# Patient Record
Sex: MALE | Race: WHITE | NOT HISPANIC OR LATINO | Employment: PART TIME | URBAN - METROPOLITAN AREA
[De-identification: names, ages, dates, MRNs, and addresses within clinical notes are randomized per-mention and may not be internally consistent; named-entity substitution may affect disease eponyms.]

---

## 2021-06-14 ENCOUNTER — PREP FOR SURGERY (OUTPATIENT)
Dept: OTHER | Facility: HOSPITAL | Age: 41
End: 2021-06-14

## 2021-06-14 ENCOUNTER — OFFICE VISIT (OUTPATIENT)
Dept: SURGERY | Facility: CLINIC | Age: 41
End: 2021-06-14

## 2021-06-14 VITALS — HEIGHT: 75 IN | BODY MASS INDEX: 25.66 KG/M2 | WEIGHT: 206.4 LBS

## 2021-06-14 DIAGNOSIS — K21.9 GASTROESOPHAGEAL REFLUX DISEASE WITHOUT ESOPHAGITIS: ICD-10-CM

## 2021-06-14 DIAGNOSIS — R11.14 BILIOUS VOMITING WITH NAUSEA: Primary | ICD-10-CM

## 2021-06-14 DIAGNOSIS — K21.9 GERD WITHOUT ESOPHAGITIS: ICD-10-CM

## 2021-06-14 DIAGNOSIS — R11.2 NAUSEA WITH VOMITING: Primary | ICD-10-CM

## 2021-06-14 PROCEDURE — 99203 OFFICE O/P NEW LOW 30 MIN: CPT | Performed by: NURSE PRACTITIONER

## 2021-06-14 RX ORDER — SUCRALFATE 1 G/1
1 TABLET ORAL 4 TIMES DAILY
Status: ON HOLD | COMMUNITY
End: 2021-07-07

## 2021-06-14 RX ORDER — LEVOMILNACIPRAN HYDROCHLORIDE 120 MG/1
120 CAPSULE, EXTENDED RELEASE ORAL DAILY
COMMUNITY

## 2021-06-14 RX ORDER — ATORVASTATIN CALCIUM 20 MG/1
20 TABLET, FILM COATED ORAL DAILY
COMMUNITY

## 2021-06-14 RX ORDER — SODIUM CHLORIDE 0.9 % (FLUSH) 0.9 %
3 SYRINGE (ML) INJECTION EVERY 12 HOURS SCHEDULED
Status: CANCELLED | OUTPATIENT
Start: 2021-06-14

## 2021-06-14 RX ORDER — ARIPIPRAZOLE 2 MG/1
2 TABLET ORAL DAILY
COMMUNITY

## 2021-06-14 RX ORDER — TRAZODONE HYDROCHLORIDE 100 MG/1
100 TABLET ORAL NIGHTLY
COMMUNITY

## 2021-06-14 RX ORDER — SODIUM CHLORIDE 0.9 % (FLUSH) 0.9 %
10 SYRINGE (ML) INJECTION AS NEEDED
Status: CANCELLED | OUTPATIENT
Start: 2021-06-14

## 2021-06-14 RX ORDER — LISINOPRIL 20 MG/1
20 TABLET ORAL DAILY
COMMUNITY
End: 2021-06-16

## 2021-06-14 RX ORDER — ONDANSETRON 4 MG/1
4 TABLET, FILM COATED ORAL EVERY 6 HOURS PRN
Qty: 15 TABLET | Refills: 0 | Status: ON HOLD | OUTPATIENT
Start: 2021-06-14 | End: 2021-07-08 | Stop reason: SDUPTHER

## 2021-06-14 RX ORDER — LISINOPRIL 30 MG/1
30 TABLET ORAL DAILY
COMMUNITY

## 2021-06-14 NOTE — PROGRESS NOTES
"Chief Complaint  EGD (Vomitting daily x6 months)    Subjective          Vasiliy Van presents to [facility] GENERAL SURGERY  Patient presents today on referral from Yajaira Conklin for nausea and vomiting x6 months.  Patient reports that he is vomiting mostly in the morning, reports that yellow in color.  Reports when he lies flat in the bed at night he has coughing spells.  Admits to GERD symptoms.  Patient was started on Carafate 4X/day, reports that it has improved his symptoms some.  Denies any abdominal pain or dysphagia.  Patient does report report feeling full before finishing meals.  Denies any family history of esophageal or stomach cancer.  No previous EGD.      Objective   Vital Signs:   Ht 190.5 cm (75\")   Wt 93.6 kg (206 lb 6.4 oz)   BMI 25.80 kg/m²     Physical Exam  Constitutional:       Appearance: Normal appearance.   Pulmonary:      Effort: Pulmonary effort is normal.   Abdominal:      General: Abdomen is flat.      Palpations: Abdomen is soft.   Neurological:      Mental Status: He is alert.        Result Review :                 Assessment and Plan    Diagnoses and all orders for this visit:    1. Bilious vomiting with nausea (Primary)    2. Gastroesophageal reflux disease without esophagitis        Follow Up   Return for EGD with Dr. Mejia on 6/22/21.  Patient was given instructions and counseling regarding his condition or for health maintenance advice. Please see specific information pulled into the AVS if appropriate.       "

## 2021-06-16 NOTE — PRE-PROCEDURE INSTRUCTIONS
ALSO INSTRUCTED PT ON MEDS TO TAKE MORNING OF THE PROCEDURE...ONLY TAKE ZOFRAN,ABILIFY, AND CARAFATE SINCE PROCEDURE IS NOT UNTIL 1:30 PM   Finasteride Counseling:  I discussed with the patient the risks of use of finasteride including but not limited to decreased libido, decreased ejaculate volume, gynecomastia, and depression. Women should not handle medication.  All of the patient's questions and concerns were addressed. Finasteride Male Counseling: Finasteride Counseling:  I discussed with the patient the risks of use of finasteride including but not limited to decreased libido, decreased ejaculate volume, gynecomastia, and depression. Women should not handle medication.  All of the patient's questions and concerns were addressed.

## 2021-06-17 ENCOUNTER — LAB (OUTPATIENT)
Dept: LAB | Facility: HOSPITAL | Age: 41
End: 2021-06-17

## 2021-06-17 DIAGNOSIS — K21.9 GERD WITHOUT ESOPHAGITIS: ICD-10-CM

## 2021-06-17 DIAGNOSIS — R11.2 NAUSEA WITH VOMITING: ICD-10-CM

## 2021-06-17 PROCEDURE — C9803 HOPD COVID-19 SPEC COLLECT: HCPCS

## 2021-06-17 PROCEDURE — U0005 INFEC AGEN DETEC AMPLI PROBE: HCPCS

## 2021-06-17 PROCEDURE — U0003 INFECTIOUS AGENT DETECTION BY NUCLEIC ACID (DNA OR RNA); SEVERE ACUTE RESPIRATORY SYNDROME CORONAVIRUS 2 (SARS-COV-2) (CORONAVIRUS DISEASE [COVID-19]), AMPLIFIED PROBE TECHNIQUE, MAKING USE OF HIGH THROUGHPUT TECHNOLOGIES AS DESCRIBED BY CMS-2020-01-R: HCPCS

## 2021-06-18 LAB — SARS-COV-2 RNA RESP QL NAA+PROBE: NOT DETECTED

## 2021-06-22 ENCOUNTER — ANESTHESIA EVENT (OUTPATIENT)
Dept: GASTROENTEROLOGY | Facility: HOSPITAL | Age: 41
End: 2021-06-22

## 2021-06-22 ENCOUNTER — HOSPITAL ENCOUNTER (OUTPATIENT)
Facility: HOSPITAL | Age: 41
Setting detail: HOSPITAL OUTPATIENT SURGERY
Discharge: HOME | End: 2021-06-22
Attending: SURGERY | Admitting: SURGERY

## 2021-06-22 ENCOUNTER — ANESTHESIA (OUTPATIENT)
Dept: GASTROENTEROLOGY | Facility: HOSPITAL | Age: 41
End: 2021-06-22

## 2021-06-22 VITALS
SYSTOLIC BLOOD PRESSURE: 124 MMHG | DIASTOLIC BLOOD PRESSURE: 87 MMHG | HEART RATE: 98 BPM | WEIGHT: 202.82 LBS | OXYGEN SATURATION: 97 % | TEMPERATURE: 97.8 F | RESPIRATION RATE: 15 BRPM | HEIGHT: 74 IN | BODY MASS INDEX: 26.03 KG/M2

## 2021-06-22 DIAGNOSIS — R11.2 NAUSEA WITH VOMITING: ICD-10-CM

## 2021-06-22 DIAGNOSIS — K21.9 GERD WITHOUT ESOPHAGITIS: ICD-10-CM

## 2021-06-22 PROCEDURE — 88305 TISSUE EXAM BY PATHOLOGIST: CPT | Performed by: SURGERY

## 2021-06-22 PROCEDURE — 25010000002 PROPOFOL 10 MG/ML EMULSION: Performed by: NURSE ANESTHETIST, CERTIFIED REGISTERED

## 2021-06-22 RX ORDER — SODIUM CHLORIDE, SODIUM LACTATE, POTASSIUM CHLORIDE, CALCIUM CHLORIDE 600; 310; 30; 20 MG/100ML; MG/100ML; MG/100ML; MG/100ML
9 INJECTION, SOLUTION INTRAVENOUS CONTINUOUS
Status: DISCONTINUED | OUTPATIENT
Start: 2021-06-22 | End: 2021-06-22 | Stop reason: HOSPADM

## 2021-06-22 RX ORDER — SODIUM CHLORIDE 0.9 % (FLUSH) 0.9 %
10 SYRINGE (ML) INJECTION AS NEEDED
Status: DISCONTINUED | OUTPATIENT
Start: 2021-06-22 | End: 2021-06-22 | Stop reason: HOSPADM

## 2021-06-22 RX ORDER — LIDOCAINE HYDROCHLORIDE 20 MG/ML
INJECTION, SOLUTION INFILTRATION; PERINEURAL AS NEEDED
Status: DISCONTINUED | OUTPATIENT
Start: 2021-06-22 | End: 2021-06-22 | Stop reason: SURG

## 2021-06-22 RX ORDER — SODIUM CHLORIDE 0.9 % (FLUSH) 0.9 %
3 SYRINGE (ML) INJECTION EVERY 12 HOURS SCHEDULED
Status: DISCONTINUED | OUTPATIENT
Start: 2021-06-22 | End: 2021-06-22 | Stop reason: HOSPADM

## 2021-06-22 RX ORDER — PROPOFOL 10 MG/ML
VIAL (ML) INTRAVENOUS AS NEEDED
Status: DISCONTINUED | OUTPATIENT
Start: 2021-06-22 | End: 2021-06-22 | Stop reason: SURG

## 2021-06-22 RX ADMIN — PROPOFOL 100 MG: 10 INJECTION, EMULSION INTRAVENOUS at 14:25

## 2021-06-22 RX ADMIN — SODIUM CHLORIDE, POTASSIUM CHLORIDE, SODIUM LACTATE AND CALCIUM CHLORIDE: 600; 310; 30; 20 INJECTION, SOLUTION INTRAVENOUS at 14:21

## 2021-06-22 RX ADMIN — LIDOCAINE HYDROCHLORIDE 100 MG: 20 INJECTION, SOLUTION INFILTRATION; PERINEURAL at 14:25

## 2021-06-22 RX ADMIN — PROPOFOL 175 MCG/KG/MIN: 10 INJECTION, EMULSION INTRAVENOUS at 14:25

## 2021-06-22 NOTE — ANESTHESIA PREPROCEDURE EVALUATION
Anesthesia Evaluation     Patient summary reviewed and Nursing notes reviewed   no history of anesthetic complications:  NPO Solid Status: > 8 hours  NPO Liquid Status: > 2 hours           Airway   Mallampati: II  TM distance: >3 FB  Neck ROM: full  No difficulty expected  Dental      Pulmonary - normal exam    breath sounds clear to auscultation  (+) sleep apnea,   Cardiovascular - normal exam  Exercise tolerance: good (4-7 METS)    Rhythm: regular    (+) hypertension, hyperlipidemia,       Neuro/Psych- negative ROS  GI/Hepatic/Renal/Endo    (+)  GERD,      Musculoskeletal (-) negative ROS    Abdominal    Substance History - negative use     OB/GYN negative ob/gyn ROS         Other - negative ROS                     Anesthesia Plan    ASA 3     general   total IV anesthesia  intravenous induction     Anesthetic plan, all risks, benefits, and alternatives have been provided, discussed and informed consent has been obtained with: patient.

## 2021-06-22 NOTE — DISCHARGE INSTRUCTIONS
Hiatal Hernia    A hiatal hernia occurs when part of the stomach slides above the muscle that separates the abdomen from the chest (diaphragm). A person can be born with a hiatal hernia (congenital), or it may develop over time. In almost all cases of hiatal hernia, only the top part of the stomach pushes through the diaphragm.  Many people have a hiatal hernia with no symptoms. The larger the hernia, the more likely it is that you will have symptoms. In some cases, a hiatal hernia allows stomach acid to flow back into the tube that carries food from your mouth to your stomach (esophagus). This may cause heartburn symptoms. Severe heartburn symptoms may mean that you have developed a condition called gastroesophageal reflux disease (GERD).  What are the causes?  This condition is caused by a weakness in the opening (hiatus) where the esophagus passes through the diaphragm to attach to the upper part of the stomach. A person may be born with a weakness in the hiatus, or a weakness can develop over time.  What increases the risk?  This condition is more likely to develop in:  · Older people. Age is a major risk factor for a hiatal hernia, especially if you are over the age of 50.  · Pregnant women.  · People who are overweight.  · People who have frequent constipation.  What are the signs or symptoms?  Symptoms of this condition usually develop in the form of GERD symptoms. Symptoms include:  · Heartburn.  · Belching.  · Indigestion.  · Trouble swallowing.  · Coughing or wheezing.  · Sore throat.  · Hoarseness.  · Chest pain.  · Nausea and vomiting.  How is this diagnosed?  This condition may be diagnosed during testing for GERD. Tests that may be done include:  · X-rays of your stomach or chest.  · An upper gastrointestinal (GI) series. This is an X-ray exam of your GI tract that is taken after you swallow a chalky liquid that shows up clearly on the X-ray.  · Endoscopy. This is a procedure to look into your stomach  using a thin, flexible tube that has a tiny camera and light on the end of it.  How is this treated?  This condition may be treated by:  · Dietary and lifestyle changes to help reduce GERD symptoms.  · Medicines. These may include:  ? Over-the-counter antacids.  ? Medicines that make your stomach empty more quickly.  ? Medicines that block the production of stomach acid (H2 blockers).  ? Stronger medicines to reduce stomach acid (proton pump inhibitors).  · Surgery to repair the hernia, if other treatments are not helping.  If you have no symptoms, you may not need treatment.  Follow these instructions at home:  Lifestyle and activity  · Do not use any products that contain nicotine or tobacco, such as cigarettes and e-cigarettes. If you need help quitting, ask your health care provider.  · Try to achieve and maintain a healthy body weight.  · Avoid putting pressure on your abdomen. Anything that puts pressure on your abdomen increases the amount of acid that may be pushed up into your esophagus.  ? Avoid bending over, especially after eating.  ? Raise the head of your bed by putting blocks under the legs. This keeps your head and esophagus higher than your stomach.  ? Do not wear tight clothing around your chest or stomach.  ? Try not to strain when having a bowel movement, when urinating, or when lifting heavy objects.  Eating and drinking  · Avoid foods that can worsen GERD symptoms. These may include:  ? Fatty foods, like fried foods.  ? Citrus fruits, like oranges or lemon.  ? Other foods and drinks that contain acid, like orange juice or tomatoes.  ? Spicy food.  ? Chocolate.  · Eat frequent small meals instead of three large meals a day. This helps prevent your stomach from getting too full.  ? Eat slowly.  ? Do not lie down right after eating.  ? Do not eat 1-2 hours before bed.  · Do not drink beverages with caffeine. These include cola, coffee, cocoa, and tea.  · Do not drink alcohol.  General  instructions  · Take over-the-counter and prescription medicines only as told by your health care provider.  · Keep all follow-up visits as told by your health care provider. This is important.  Contact a health care provider if:  · Your symptoms are not controlled with medicines or lifestyle changes.  · You are having trouble swallowing.  · You have coughing or wheezing that will not go away.  Get help right away if:  · Your pain is getting worse.  · Your pain spreads to your arms, neck, jaw, teeth, or back.  · You have shortness of breath.  · You sweat for no reason.  · You feel sick to your stomach (nauseous) or you vomit.  · You vomit blood.  · You have bright red blood in your stools.  · You have black, tarry stools.  This information is not intended to replace advice given to you by your health care provider. Make sure you discuss any questions you have with your health care provider.  Document Revised: 11/30/2018 Document Reviewed: 07/23/2018  [employer] Patient Education © 2021 [employer]    Dr. Mejia's Instructions       • Follow-up with Yajaira Darden at your next scheduled appointment time with her.    • Dr. Mejia will send a letter to Yajaira Darden.    • See education information provided about hiatal hernia.

## 2021-06-22 NOTE — ANESTHESIA POSTPROCEDURE EVALUATION
Patient: Vasiliy Van    Procedure Summary     Date: 06/22/21 Room / Location: [facility] ENDOSCOPY 2 / [facility]    Anesthesia Start: 1421 Anesthesia Stop: 1442    Procedure: ESOPHAGOGASTRODUODENOSCOPY with biopsy (N/A ) Diagnosis:       Nausea with vomiting      GERD without esophagitis      (Nausea with vomiting [R11.2])      (GERD without esophagitis [K21.9])    Surgeons: Satish Mejia MD Provider: Guillermo Logan MD    Anesthesia Type: general ASA Status: 3          Anesthesia Type: general    Vitals  Vitals Value Taken Time   /87 06/22/21 1455   Temp 36.6 °C (97.8 °F) 06/22/21 1455   Pulse 98 06/22/21 1450   Resp 15 06/22/21 1455   SpO2 97 % 06/22/21 1455           Post Anesthesia Care and Evaluation    Patient location during evaluation: bedside  Patient participation: complete - patient participated  Level of consciousness: awake  Pain score: 0  Pain management: adequate  Airway patency: patent  Anesthetic complications: No anesthetic complications  PONV Status: none  Cardiovascular status: acceptable and stable  Respiratory status: acceptable and room air  Hydration status: acceptable

## 2021-06-24 LAB
CYTO UR: NORMAL
LAB AP CASE REPORT: NORMAL
LAB AP CLINICAL INFORMATION: NORMAL
PATH REPORT.FINAL DX SPEC: NORMAL
PATH REPORT.GROSS SPEC: NORMAL

## 2021-07-06 ENCOUNTER — APPOINTMENT (OUTPATIENT)
Dept: CT IMAGING | Facility: HOSPITAL | Age: 41
End: 2021-07-06

## 2021-07-06 ENCOUNTER — HOSPITAL ENCOUNTER (INPATIENT)
Facility: HOSPITAL | Age: 41
LOS: 1 days | Discharge: HOME | End: 2021-07-08
Attending: EMERGENCY MEDICINE | Admitting: INTERNAL MEDICINE

## 2021-07-06 DIAGNOSIS — R11.2 INTRACTABLE VOMITING WITH NAUSEA, UNSPECIFIED VOMITING TYPE: Primary | ICD-10-CM

## 2021-07-06 DIAGNOSIS — N17.9 ACUTE KIDNEY INJURY (HCC): ICD-10-CM

## 2021-07-06 LAB
ALBUMIN SERPL-MCNC: 4.7 G/DL (ref 3.5–5.2)
ALBUMIN/GLOB SERPL: 1.5 G/DL
ALP SERPL-CCNC: 85 U/L (ref 39–117)
ALT SERPL W P-5'-P-CCNC: 72 U/L (ref 1–41)
ANION GAP SERPL CALCULATED.3IONS-SCNC: 15.5 MMOL/L (ref 5–15)
AST SERPL-CCNC: 96 U/L (ref 1–40)
BASOPHILS # BLD AUTO: 0.05 10*3/MM3 (ref 0–0.2)
BASOPHILS NFR BLD AUTO: 0.3 % (ref 0–1.5)
BILIRUB SERPL-MCNC: 0.5 MG/DL (ref 0–1.2)
BUN SERPL-MCNC: 26 MG/DL (ref 6–20)
BUN/CREAT SERPL: 8.3 (ref 7–25)
CALCIUM SPEC-SCNC: 10.4 MG/DL (ref 8.6–10.5)
CHLORIDE SERPL-SCNC: 92 MMOL/L (ref 98–107)
CO2 SERPL-SCNC: 27.5 MMOL/L (ref 22–29)
CREAT SERPL-MCNC: 3.13 MG/DL (ref 0.76–1.27)
D-LACTATE SERPL-SCNC: 1.2 MMOL/L (ref 0.5–2)
DEPRECATED RDW RBC AUTO: 46.2 FL (ref 37–54)
EOSINOPHIL # BLD AUTO: 0.03 10*3/MM3 (ref 0–0.4)
EOSINOPHIL NFR BLD AUTO: 0.2 % (ref 0.3–6.2)
ERYTHROCYTE [DISTWIDTH] IN BLOOD BY AUTOMATED COUNT: 12.7 % (ref 12.3–15.4)
GFR SERPL CREATININE-BSD FRML MDRD: 22 ML/MIN/1.73
GLOBULIN UR ELPH-MCNC: 3.2 GM/DL
GLUCOSE SERPL-MCNC: 105 MG/DL (ref 65–99)
HCT VFR BLD AUTO: 46 % (ref 37.5–51)
HGB BLD-MCNC: 15.2 G/DL (ref 13–17.7)
HOLD SPECIMEN: NORMAL
HOLD SPECIMEN: NORMAL
IMM GRANULOCYTES # BLD AUTO: 0.1 10*3/MM3 (ref 0–0.05)
IMM GRANULOCYTES NFR BLD AUTO: 0.5 % (ref 0–0.5)
LIPASE SERPL-CCNC: 17 U/L (ref 13–60)
LYMPHOCYTES # BLD AUTO: 2.7 10*3/MM3 (ref 0.7–3.1)
LYMPHOCYTES NFR BLD AUTO: 13.9 % (ref 19.6–45.3)
MCH RBC QN AUTO: 32.7 PG (ref 26.6–33)
MCHC RBC AUTO-ENTMCNC: 33 G/DL (ref 31.5–35.7)
MCV RBC AUTO: 98.9 FL (ref 79–97)
MONOCYTES # BLD AUTO: 1.61 10*3/MM3 (ref 0.1–0.9)
MONOCYTES NFR BLD AUTO: 8.3 % (ref 5–12)
NEUTROPHILS NFR BLD AUTO: 14.99 10*3/MM3 (ref 1.7–7)
NEUTROPHILS NFR BLD AUTO: 76.8 % (ref 42.7–76)
NRBC BLD AUTO-RTO: 0 /100 WBC (ref 0–0.2)
PLATELET # BLD AUTO: 296 10*3/MM3 (ref 140–450)
PMV BLD AUTO: 10.6 FL (ref 6–12)
POTASSIUM SERPL-SCNC: 5.2 MMOL/L (ref 3.5–5.2)
PROT SERPL-MCNC: 7.9 G/DL (ref 6–8.5)
RBC # BLD AUTO: 4.65 10*6/MM3 (ref 4.14–5.8)
SODIUM SERPL-SCNC: 135 MMOL/L (ref 136–145)
WBC # BLD AUTO: 19.48 10*3/MM3 (ref 3.4–10.8)
WHOLE BLOOD HOLD SPECIMEN: NORMAL

## 2021-07-06 PROCEDURE — 85025 COMPLETE CBC W/AUTO DIFF WBC: CPT | Performed by: EMERGENCY MEDICINE

## 2021-07-06 PROCEDURE — 80053 COMPREHEN METABOLIC PANEL: CPT | Performed by: EMERGENCY MEDICINE

## 2021-07-06 PROCEDURE — 74176 CT ABD & PELVIS W/O CONTRAST: CPT

## 2021-07-06 PROCEDURE — 36415 COLL VENOUS BLD VENIPUNCTURE: CPT | Performed by: EMERGENCY MEDICINE

## 2021-07-06 PROCEDURE — 83605 ASSAY OF LACTIC ACID: CPT | Performed by: NURSE PRACTITIONER

## 2021-07-06 PROCEDURE — 36415 COLL VENOUS BLD VENIPUNCTURE: CPT

## 2021-07-06 PROCEDURE — 25010000002 ONDANSETRON PER 1 MG: Performed by: NURSE PRACTITIONER

## 2021-07-06 PROCEDURE — 83690 ASSAY OF LIPASE: CPT | Performed by: EMERGENCY MEDICINE

## 2021-07-06 PROCEDURE — 87040 BLOOD CULTURE FOR BACTERIA: CPT | Performed by: NURSE PRACTITIONER

## 2021-07-06 PROCEDURE — 99284 EMERGENCY DEPT VISIT MOD MDM: CPT

## 2021-07-06 RX ORDER — SODIUM CHLORIDE 9 MG/ML
150 INJECTION, SOLUTION INTRAVENOUS CONTINUOUS
Status: DISCONTINUED | OUTPATIENT
Start: 2021-07-07 | End: 2021-07-07

## 2021-07-06 RX ORDER — SODIUM CHLORIDE 0.9 % (FLUSH) 0.9 %
10 SYRINGE (ML) INJECTION AS NEEDED
Status: DISCONTINUED | OUTPATIENT
Start: 2021-07-06 | End: 2021-07-08 | Stop reason: HOSPADM

## 2021-07-06 RX ORDER — ONDANSETRON 2 MG/ML
4 INJECTION INTRAMUSCULAR; INTRAVENOUS ONCE
Status: COMPLETED | OUTPATIENT
Start: 2021-07-06 | End: 2021-07-06

## 2021-07-06 RX ADMIN — ONDANSETRON 4 MG: 2 INJECTION INTRAMUSCULAR; INTRAVENOUS at 22:33

## 2021-07-06 RX ADMIN — SODIUM CHLORIDE 1000 ML: 9 INJECTION, SOLUTION INTRAVENOUS at 22:33

## 2021-07-07 ENCOUNTER — TRANSCRIBE ORDERS (OUTPATIENT)
Dept: ADMINISTRATIVE | Facility: HOSPITAL | Age: 41
End: 2021-07-07

## 2021-07-07 ENCOUNTER — APPOINTMENT (OUTPATIENT)
Dept: CT IMAGING | Facility: HOSPITAL | Age: 41
End: 2021-07-07

## 2021-07-07 ENCOUNTER — APPOINTMENT (OUTPATIENT)
Dept: NUCLEAR MEDICINE | Facility: HOSPITAL | Age: 41
End: 2021-07-07

## 2021-07-07 DIAGNOSIS — R10.9 ABDOMINAL PAIN, UNSPECIFIED ABDOMINAL LOCATION: Primary | ICD-10-CM

## 2021-07-07 LAB
AMPHET+METHAMPHET UR QL: POSITIVE
ANION GAP SERPL CALCULATED.3IONS-SCNC: 6.6 MMOL/L (ref 5–15)
BACTERIA UR QL AUTO: ABNORMAL /HPF
BARBITURATES UR QL SCN: NEGATIVE
BASOPHILS # BLD AUTO: 0.06 10*3/MM3 (ref 0–0.2)
BASOPHILS NFR BLD AUTO: 0.6 % (ref 0–1.5)
BENZODIAZ UR QL SCN: NEGATIVE
BILIRUB UR QL STRIP: NEGATIVE
BUN SERPL-MCNC: 24 MG/DL (ref 6–20)
BUN/CREAT SERPL: 12 (ref 7–25)
CALCIUM SPEC-SCNC: 9 MG/DL (ref 8.6–10.5)
CANNABINOIDS SERPL QL: NEGATIVE
CHLORIDE SERPL-SCNC: 98 MMOL/L (ref 98–107)
CLARITY UR: CLEAR
CO2 SERPL-SCNC: 29.4 MMOL/L (ref 22–29)
COCAINE UR QL: NEGATIVE
COLOR UR: YELLOW
CREAT SERPL-MCNC: 2 MG/DL (ref 0.76–1.27)
CREAT UR-MCNC: 111.1 MG/DL
DEPRECATED RDW RBC AUTO: 45.8 FL (ref 37–54)
EOSINOPHIL # BLD AUTO: 0.19 10*3/MM3 (ref 0–0.4)
EOSINOPHIL NFR BLD AUTO: 1.7 % (ref 0.3–6.2)
ERYTHROCYTE [DISTWIDTH] IN BLOOD BY AUTOMATED COUNT: 12.3 % (ref 12.3–15.4)
GFR SERPL CREATININE-BSD FRML MDRD: 37 ML/MIN/1.73
GLUCOSE SERPL-MCNC: 79 MG/DL (ref 65–99)
GLUCOSE UR STRIP-MCNC: ABNORMAL MG/DL
HAV IGM SERPL QL IA: NORMAL
HBV CORE IGM SERPL QL IA: NORMAL
HBV SURFACE AG SERPL QL IA: NORMAL
HCT VFR BLD AUTO: 41.7 % (ref 37.5–51)
HCV AB SER DONR QL: NORMAL
HGB BLD-MCNC: 13.6 G/DL (ref 13–17.7)
HGB UR QL STRIP.AUTO: ABNORMAL
HYALINE CASTS UR QL AUTO: ABNORMAL /LPF
IMM GRANULOCYTES # BLD AUTO: 0.04 10*3/MM3 (ref 0–0.05)
IMM GRANULOCYTES NFR BLD AUTO: 0.4 % (ref 0–0.5)
KETONES UR QL STRIP: NEGATIVE
LEUKOCYTE ESTERASE UR QL STRIP.AUTO: NEGATIVE
LYMPHOCYTES # BLD AUTO: 3.84 10*3/MM3 (ref 0.7–3.1)
LYMPHOCYTES NFR BLD AUTO: 35.4 % (ref 19.6–45.3)
MCH RBC QN AUTO: 32.9 PG (ref 26.6–33)
MCHC RBC AUTO-ENTMCNC: 32.6 G/DL (ref 31.5–35.7)
MCV RBC AUTO: 101 FL (ref 79–97)
METHADONE UR QL SCN: NEGATIVE
MONOCYTES # BLD AUTO: 1.05 10*3/MM3 (ref 0.1–0.9)
MONOCYTES NFR BLD AUTO: 9.7 % (ref 5–12)
NEUTROPHILS NFR BLD AUTO: 5.68 10*3/MM3 (ref 1.7–7)
NEUTROPHILS NFR BLD AUTO: 52.2 % (ref 42.7–76)
NITRITE UR QL STRIP: NEGATIVE
NRBC BLD AUTO-RTO: 0 /100 WBC (ref 0–0.2)
OPIATES UR QL: NEGATIVE
OXYCODONE UR QL SCN: NEGATIVE
PH UR STRIP.AUTO: 7 [PH] (ref 5–8)
PLATELET # BLD AUTO: 221 10*3/MM3 (ref 140–450)
PMV BLD AUTO: 10.7 FL (ref 6–12)
POTASSIUM SERPL-SCNC: 4.9 MMOL/L (ref 3.5–5.2)
PROT UR QL STRIP: ABNORMAL
PROT UR-MCNC: 19.4 MG/DL
PROT/CREAT UR: 0.2 MG/G{CREAT}
RBC # BLD AUTO: 4.13 10*6/MM3 (ref 4.14–5.8)
RBC # UR: ABNORMAL /HPF
REF LAB TEST METHOD: ABNORMAL
SODIUM SERPL-SCNC: 134 MMOL/L (ref 136–145)
SP GR UR STRIP: 1.02 (ref 1–1.03)
SQUAMOUS #/AREA URNS HPF: ABNORMAL /HPF
UROBILINOGEN UR QL STRIP: ABNORMAL
WBC # BLD AUTO: 10.86 10*3/MM3 (ref 3.4–10.8)
WBC UR QL AUTO: ABNORMAL /HPF

## 2021-07-07 PROCEDURE — 78264 GASTRIC EMPTYING IMG STUDY: CPT

## 2021-07-07 PROCEDURE — 80307 DRUG TEST PRSMV CHEM ANLYZR: CPT | Performed by: INTERNAL MEDICINE

## 2021-07-07 PROCEDURE — 78264 GASTRIC EMPTYING IMG STUDY: CPT | Performed by: INTERNAL MEDICINE

## 2021-07-07 PROCEDURE — 99223 1ST HOSP IP/OBS HIGH 75: CPT | Performed by: GENERAL PRACTICE

## 2021-07-07 PROCEDURE — 85025 COMPLETE CBC W/AUTO DIFF WBC: CPT | Performed by: PHYSICIAN ASSISTANT

## 2021-07-07 PROCEDURE — 80074 ACUTE HEPATITIS PANEL: CPT | Performed by: GENERAL PRACTICE

## 2021-07-07 PROCEDURE — 84156 ASSAY OF PROTEIN URINE: CPT | Performed by: INTERNAL MEDICINE

## 2021-07-07 PROCEDURE — 81001 URINALYSIS AUTO W/SCOPE: CPT | Performed by: EMERGENCY MEDICINE

## 2021-07-07 PROCEDURE — 80048 BASIC METABOLIC PNL TOTAL CA: CPT | Performed by: PHYSICIAN ASSISTANT

## 2021-07-07 PROCEDURE — A9541 TC99M SULFUR COLLOID: HCPCS | Performed by: INTERNAL MEDICINE

## 2021-07-07 PROCEDURE — 82570 ASSAY OF URINE CREATININE: CPT | Performed by: INTERNAL MEDICINE

## 2021-07-07 PROCEDURE — 70450 CT HEAD/BRAIN W/O DYE: CPT

## 2021-07-07 PROCEDURE — 0 TECHNETIUM SULFUR COLLOID: Performed by: INTERNAL MEDICINE

## 2021-07-07 RX ORDER — POLYETHYLENE GLYCOL 3350 17 G/17G
17 POWDER, FOR SOLUTION ORAL DAILY PRN
Status: DISCONTINUED | OUTPATIENT
Start: 2021-07-07 | End: 2021-07-08 | Stop reason: HOSPADM

## 2021-07-07 RX ORDER — SODIUM CHLORIDE 0.9 % (FLUSH) 0.9 %
10 SYRINGE (ML) INJECTION EVERY 12 HOURS SCHEDULED
Status: DISCONTINUED | OUTPATIENT
Start: 2021-07-07 | End: 2021-07-08 | Stop reason: HOSPADM

## 2021-07-07 RX ORDER — ACETAMINOPHEN 650 MG/1
650 SUPPOSITORY RECTAL EVERY 4 HOURS PRN
Status: DISCONTINUED | OUTPATIENT
Start: 2021-07-07 | End: 2021-07-08 | Stop reason: HOSPADM

## 2021-07-07 RX ORDER — SUCRALFATE 1 G/1
1 TABLET ORAL 4 TIMES DAILY
COMMUNITY
End: 2022-10-11

## 2021-07-07 RX ORDER — ACETAMINOPHEN 160 MG/5ML
650 SOLUTION ORAL EVERY 4 HOURS PRN
Status: DISCONTINUED | OUTPATIENT
Start: 2021-07-07 | End: 2021-07-08 | Stop reason: HOSPADM

## 2021-07-07 RX ORDER — ACETAMINOPHEN 325 MG/1
650 TABLET ORAL EVERY 4 HOURS PRN
Status: DISCONTINUED | OUTPATIENT
Start: 2021-07-07 | End: 2021-07-08 | Stop reason: HOSPADM

## 2021-07-07 RX ORDER — SODIUM CHLORIDE 9 MG/ML
125 INJECTION, SOLUTION INTRAVENOUS CONTINUOUS
Status: DISCONTINUED | OUTPATIENT
Start: 2021-07-07 | End: 2021-07-07

## 2021-07-07 RX ORDER — SODIUM CHLORIDE, SODIUM LACTATE, POTASSIUM CHLORIDE, CALCIUM CHLORIDE 600; 310; 30; 20 MG/100ML; MG/100ML; MG/100ML; MG/100ML
125 INJECTION, SOLUTION INTRAVENOUS CONTINUOUS
Status: DISCONTINUED | OUTPATIENT
Start: 2021-07-07 | End: 2021-07-08 | Stop reason: HOSPADM

## 2021-07-07 RX ORDER — AMOXICILLIN 250 MG
2 CAPSULE ORAL 2 TIMES DAILY
Status: DISCONTINUED | OUTPATIENT
Start: 2021-07-07 | End: 2021-07-07

## 2021-07-07 RX ORDER — SODIUM CHLORIDE 0.9 % (FLUSH) 0.9 %
10 SYRINGE (ML) INJECTION AS NEEDED
Status: DISCONTINUED | OUTPATIENT
Start: 2021-07-07 | End: 2021-07-08 | Stop reason: HOSPADM

## 2021-07-07 RX ORDER — PANTOPRAZOLE SODIUM 40 MG/10ML
40 INJECTION, POWDER, LYOPHILIZED, FOR SOLUTION INTRAVENOUS ONCE
Status: COMPLETED | OUTPATIENT
Start: 2021-07-07 | End: 2021-07-07

## 2021-07-07 RX ORDER — BISACODYL 10 MG
10 SUPPOSITORY, RECTAL RECTAL DAILY PRN
Status: DISCONTINUED | OUTPATIENT
Start: 2021-07-07 | End: 2021-07-08 | Stop reason: HOSPADM

## 2021-07-07 RX ORDER — PANTOPRAZOLE SODIUM 20 MG/1
20 TABLET, DELAYED RELEASE ORAL 2 TIMES DAILY
COMMUNITY
End: 2022-10-11

## 2021-07-07 RX ORDER — BISACODYL 5 MG/1
5 TABLET, DELAYED RELEASE ORAL DAILY PRN
Status: DISCONTINUED | OUTPATIENT
Start: 2021-07-07 | End: 2021-07-08 | Stop reason: HOSPADM

## 2021-07-07 RX ORDER — FAMOTIDINE 20 MG/1
20 TABLET, FILM COATED ORAL 2 TIMES DAILY
COMMUNITY
End: 2021-07-08 | Stop reason: HOSPADM

## 2021-07-07 RX ORDER — PANTOPRAZOLE SODIUM 40 MG/10ML
40 INJECTION, POWDER, LYOPHILIZED, FOR SOLUTION INTRAVENOUS
Status: DISCONTINUED | OUTPATIENT
Start: 2021-07-07 | End: 2021-07-07

## 2021-07-07 RX ORDER — ONDANSETRON 2 MG/ML
4 INJECTION INTRAMUSCULAR; INTRAVENOUS EVERY 4 HOURS PRN
Status: DISCONTINUED | OUTPATIENT
Start: 2021-07-07 | End: 2021-07-08 | Stop reason: HOSPADM

## 2021-07-07 RX ORDER — PANTOPRAZOLE SODIUM 40 MG/10ML
40 INJECTION, POWDER, LYOPHILIZED, FOR SOLUTION INTRAVENOUS
Status: DISCONTINUED | OUTPATIENT
Start: 2021-07-07 | End: 2021-07-08 | Stop reason: HOSPADM

## 2021-07-07 RX ADMIN — SODIUM CHLORIDE, POTASSIUM CHLORIDE, SODIUM LACTATE AND CALCIUM CHLORIDE 125 ML/HR: 600; 310; 30; 20 INJECTION, SOLUTION INTRAVENOUS at 09:22

## 2021-07-07 RX ADMIN — SODIUM CHLORIDE, PRESERVATIVE FREE 10 ML: 5 INJECTION INTRAVENOUS at 02:13

## 2021-07-07 RX ADMIN — PANTOPRAZOLE SODIUM 40 MG: 40 INJECTION, POWDER, FOR SOLUTION INTRAVENOUS at 12:45

## 2021-07-07 RX ADMIN — SODIUM CHLORIDE 125 ML/HR: 9 INJECTION, SOLUTION INTRAVENOUS at 02:12

## 2021-07-07 RX ADMIN — SODIUM CHLORIDE 150 ML/HR: 9 INJECTION, SOLUTION INTRAVENOUS at 00:43

## 2021-07-07 RX ADMIN — SODIUM CHLORIDE, POTASSIUM CHLORIDE, SODIUM LACTATE AND CALCIUM CHLORIDE 125 ML/HR: 600; 310; 30; 20 INJECTION, SOLUTION INTRAVENOUS at 17:42

## 2021-07-07 RX ADMIN — PANTOPRAZOLE SODIUM 40 MG: 40 INJECTION, POWDER, FOR SOLUTION INTRAVENOUS at 17:44

## 2021-07-07 RX ADMIN — TECHNETIUM TC 99M SULFUR COLLOID 1 DOSE: KIT at 10:00

## 2021-07-07 NOTE — CONSULTS
[facility]   Nephrology Consult Note      Patient Name: Vasiliy Van  : 1980  MRN: [id number]  Primary Care Physician:  Yajaira Darden APRN  Referring Physician: No ref. provider found  Date of admission: 2021    Subjective   Subjective     Reason for Consult/ Chief Complaint: Renal failure    HPI:  Vasiliy Van is a 40 y.o. male with recent history of intractable nausea vomiting.  He stated for about 10 days he has been nauseated and last few days prior to admission has not been able to keep much down.  Throwing up about 7 or 8 times per day.  2 weeks ago he underwent EGD.  He tells me that he has inflammation.  No lower urinary symptoms he was dizzy before he comes in with orthostatic symptoms he noticed dark urine but no gross hematuria.  No prior history of kidney disease.  No history of kidney stones.  He has not been taking NSAIDs regularly.  On admission was found to have a creatinine of 3.1.  Started on IV fluid.  Being treated symptomatically.  His urinalysis is bland.  Creatinine is improving.    Review of Systems   All systems were reviewed and negative except for: What is mentioned above.    Personal History     Past Medical History:   Diagnosis Date   • GERD (gastroesophageal reflux disease)    • High blood pressure    • High cholesterol    • Insomnia    • Sleep apnea     NO CPAP   • Spinal headache        Past Surgical History:   Procedure Laterality Date   • BACK SURGERY     • ENDOSCOPY N/A 2021    Procedure: ESOPHAGOGASTRODUODENOSCOPY with biopsy;  Surgeon: Satish Mejia MD;  Location: [facility] ENDOSCOPY;  Service: General;  Laterality: N/A;  SLIDING HIATAL HERNIA   • HIP SURGERY Left 2020    Total hip replacement   • JOINT REPLACEMENT      TOTAL HIP LEFT SIDE   • LUMBAR LAMINECTOMY         Family History: family history includes Diabetes in his mother; Heart disease in his father. Otherwise pertinent FHx was reviewed and not pertinent to current issue.  No family  history of kidney disease.    Social History:  reports that he has been smoking cigarettes. He has a 15.00 pack-year smoking history. He has never used smokeless tobacco. He reports that he does not drink alcohol and does not use drugs.    Home Medications:  ARIPiprazole, Levomilnacipran HCl ER, atorvastatin, famotidine, lisinopril, ondansetron, pantoprazole, sucralfate, and traZODone    Allergies:  Allergies   Allergen Reactions   • Bupropion Seizure       Objective    Objective     Vitals:   Temp:  [97.5 °F (36.4 °C)-98.3 °F (36.8 °C)] 97.9 °F (36.6 °C)  Heart Rate:  [75-98] 91  Resp:  [15-18] 18  BP: ()/(60-76) 134/70     Physical Exam    Constitutional: Awake, alert   Eyes: sclerae anicteric, no conjunctival injection   HENT: mucous membranes moist   Neck: Supple, no thyromegaly, no lymphadenopathy, trachea midline, No JVD   Respiratory: Clear to auscultation bilaterally, nonlabored respirations    Cardiovascular: RRR, no murmurs, rubs, or gallops.   Gastrointestinal: Positive bowel sounds, soft, nontender, nondistended   Musculoskeletal: No edema, no clubbing or cyanosis   Psychiatric: Appropriate affect, cooperative   Neurologic: Oriented x 3, moving all extremities, Cranial Nerves grossly intact, speech clear   Skin: warm and dry, no rashes     Result Review    Result Reviewed:  I have personally reviewed the results from the time of this admission to 7/7/2021 16:02 EDT and agree with these findings:  [x]  Laboratory  []  Microbiology  [x]  Radiology  []  EKG/Telemetry   []  Cardiology/Vascular   []  Pathology  [x]  Old records  []  Other:  Lab Results   Component Value Date    GLUCOSE 79 07/07/2021    CALCIUM 9.0 07/07/2021     (L) 07/07/2021    K 4.9 07/07/2021    CO2 29.4 (H) 07/07/2021    CL 98 07/07/2021    BUN 24 (H) 07/07/2021    CREATININE 2.00 (H) 07/07/2021    EGFRIFNONA 37 (L) 07/07/2021    BCR 12.0 07/07/2021    ANIONGAP 6.6 07/07/2021      Lab Results   Component Value Date    CALCIUM  9.0 07/07/2021            Most notable findings include: Creatinine creatinine is improving.  5/29/2021: Creatinine was 1.1.  Assessment/Plan   Assessment / Plan     Brief Patient Summary:  Vasiliy Van is a 40 y.o. male who admitted with intractable nausea and vomiting, volume depletion and hypotension while on ACE inhibitor's.    Active Hospital Problems:  Active Hospital Problems    Diagnosis    • Nausea with vomiting      Added automatically from request for surgery [id number]         Assessment and Plan:   -Acute kidney injury likely secondary to volume depletion and hypotension.  Urinalysis is bland.  Will quantify proteinuria.  Agree with IV fluid, renal function is improving.  Voiding well, urine color is lighter.  Continue the same and reevaluate in a.m.  -Hypotension secondary to volume depletion and blood pressure medications, resolved.  Medications are on hold.  Monitor.  -Intractable nausea and vomiting possibly secondary to esophagitis/gastritis/duodenitis, per GI.  Discussed with patient.  We will follow.    Thank you very much for this consult!    Electronically signed by Tae Cardenas MD, 07/07/21, 4:02 PM EDT.

## 2021-07-07 NOTE — PLAN OF CARE
Goal Outcome Evaluation:  Plan of Care Reviewed With: patient        Progress: no change  Outcome Summary: PT IS A&Ox4 HAS TIMES WHEN HE'S REALLY SLUGGISH AND DROWSY. PT WENT DOWN TO NM AND WAS BROUGHT BACK BEFORE TEST WAS COMPLETE DUE TO BEING DROWSY AND CONSTANTLY HAVING TO BE STIMULATED. MD IS AWARE. PT WAS PLACED ON CLEAR LIQUIDS AND TOLERATING WELL. I HAVE NOT MEDICATED WITH ZOFRAN THIS SHIFT.

## 2021-07-07 NOTE — H&P
[facility] ER HISTORY AND PHYSICAL  Date: 2021   Patient Name: Vasiliy Van  : 1980  MRN: [id number]  Primary Care Physician:  Yajaira Darden APRN  Date of admission: 2021    Subjective   Subjective     Chief Complaint: Intractable vomiting and weakness.    HPI: Vasiliy Van is a 40 y.o. male  Pt  reports nausea and vomiting that started 3 months ago.  After [place] performing endoscopy on 621.  Patient went home with no improvement of symptoms despite aggressive PPI treatment..He reports vomiting 7-8 times per day and 3 times since he has been waiting in the waiting room here. He reports severe dizziness with position changes and near syncopal and syncopal episodes. He denies abdominal pain, denies blood in vomit.  Initial work-up today revealed  a creatinine of 3.3 with BUN of 26 patient had mild elevation in ALT and AST 70-1 96.  Patient does deny drinking alcohol or doing any illicit drugs.  He denies smoking marijuana.  Patient also was admitted with leukocytosis with neutrophilic predominance.  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    Personal History     PMH  Past Medical History:   Diagnosis Date   • GERD (gastroesophageal reflux disease)    • High blood pressure    • High cholesterol    • Insomnia    • Sleep apnea     NO CPAP   • Spinal headache          PSH  Past Surgical History:   Procedure Laterality Date   • BACK SURGERY     • ENDOSCOPY N/A 2021    Procedure: ESOPHAGOGASTRODUODENOSCOPY with biopsy;  Surgeon: Satish Mejia MD;  Location: [facility] ENDOSCOPY;  Service: General;  Laterality: N/A;  SLIDING HIATAL HERNIA   • HIP SURGERY Left 2020    Total hip replacement   • JOINT REPLACEMENT      TOTAL HIP LEFT SIDE   • LUMBAR LAMINECTOMY           Family History   Problem Relation Age of Onset   • Diabetes Mother    • Heart disease Father        SOCIAL HISTORY   reports that he has  been smoking cigarettes. He has a 15.00 pack-year smoking history. He has never used smokeless tobacco. He reports that he does not drink alcohol and does not use drugs.     ALLERGIES   Allergies   Allergen Reactions   • Bupropion Seizure       HOME MEDICINES  Prior to Admission Medications   Prescriptions Last Dose Informant Patient Reported? Taking?   ARIPiprazole (ABILIFY) 2 MG tablet 7/6/2021 at Unknown time  Yes Yes   Sig: Take 2 mg by mouth Daily.   Levomilnacipran HCl ER (Fetzima) 120 MG capsule sustained-release 24 hr 7/6/2021 at Unknown time  Yes Yes   Sig: Take 120 mg by mouth Daily.   atorvastatin (LIPITOR) 20 MG tablet 7/6/2021 at Unknown time  Yes Yes   Sig: Take 20 mg by mouth Daily.   lisinopril (PRINIVIL,ZESTRIL) 30 MG tablet 7/6/2021 at Unknown time  Yes Yes   Sig: Take 30 mg by mouth Daily.   ondansetron (Zofran) 4 MG tablet Past Week at Unknown time  No Yes   Sig: Take 1 tablet by mouth Every 6 (Six) Hours As Needed for Nausea or Vomiting.   pantoprazole (PROTONIX) 20 MG EC tablet 7/6/2021 at Unknown time Self Yes Yes   Sig: Take 20 mg by mouth 2 (two) times a day.   traZODone (DESYREL) 100 MG tablet 7/6/2021 at Unknown time  Yes Yes   Sig: Take 100 mg by mouth Every Night.      Facility-Administered Medications: None           REVIEW OF SYSTEMS  REVIEW OF SYSTEMS  No fatigue, no fever or chills  Denies dysphagia or hearing changes  Denies cough dyspnea sputum and changes , denies hemoptysis or shortness of breath on exertion  Denies chest pain ,palpitations, orthopnea  Positive for abdominal pain, nausea ,vomiting ,diarrhea ,blood  per rectum   Denies dysuria ,frequency  Denies joint effusion or joint tenderness  Denies bleeding or bruising  Denies headache , weakness, or fainting         Objective   Objective     Vitals:   Temp:  [97.5 °F (36.4 °C)-98.3 °F (36.8 °C)] 98.1 °F (36.7 °C)  Heart Rate:  [] 89  Resp:  [14-18] 18  BP: ()/(60-74) 109/61    Physical Exam   Constitutional:  Awake, alert, no acute distress, clinically dehydrated.  Eyes: Pupils equal, sclerae anicteric, no conjunctival injection  HENT: NCAT, mucous membranes moist  Neck: Supple, no thyromegaly, no lymphadenopathy, trachea midline  Respiratory: Clear to auscultation bilaterally, nonlabored respirations   Cardiovascular: RRR, no murmurs, rubs, or gallops, palpable pedal pulses bilaterally  Gastrointestinal: Positive bowel sounds, soft, nontender, nondistended  Musculoskeletal: No bilateral ankle edema, no clubbing or cyanosis to extremities  Psychiatric: Appropriate affect, cooperative  Neurologic: Oriented x 3, strength symmetric in all extremities, Cranial Nerves grossly intact to confrontation, speech clear  Skin: No rashes         Assessment / Plan     Assessment/Plan:   Gastritis  Duodenitis  Intractable vomiting  Acute renal failure  Severe dehydration.    PLAN   Start patient on Protonix 40 twice daily  Will consult GI Dr. Rodas , patient may need gastric emptying study.  Will consult nephrology  Received volume resuscitation  Acute hepatitis panel  SCDs bilateral for DVT prophylaxis        DVT prophylaxis:  Mechanical DVT prophylaxis orders are present.    CODE STATUS:       Result Review:    I have personally reviewed the results from the time of this admission to 6/11/2021 06:16 EDT and agree with these findings:  [x]  Laboratory  [x]  Microbiology  [x]  Radiology  [x]  EKG/Telemetry   [x]  Cardiology/Vascular   []  Pathology  []  Old records  []  Other:    Admission Status:  I believe this patient meets inpatient  status.    Electronically signed by Shannon Chang MD, 07/07/21, 6:17 AM EDT.

## 2021-07-07 NOTE — PLAN OF CARE
Goal Outcome Evaluation:  Plan of Care Reviewed With: patient        Progress: no change  Outcome Summary: PT NEW ADMIT TO FLOOR, WILL MEDICATE AS NEEDED.Deborah Rendon RN

## 2021-07-07 NOTE — ED PROVIDER NOTES
Subjective   Pt reports nausea and vomiting that started 3 months ago. He was admitted on 6/21 for this problem and had an endoscopy performed by DR. Mejia. He was discharged and seen his PCP on 6/29, was prescribed Protonix and Phenergan but he is still vomiting despite taking the medications. He reports vomiting 7-8 times per day and 3 times since he has been waiting in the waiting room here. He reports severe dizziness with position changes and near syncopal and syncopal episodes. He denies abdominal pain, denies blood in vomit.       History provided by:  Patient      Review of Systems   Constitutional: Negative for chills and fever.   HENT: Negative for congestion, ear pain and sore throat.    Eyes: Negative for pain.   Respiratory: Negative for cough, chest tightness and shortness of breath.    Cardiovascular: Negative for chest pain.   Gastrointestinal: Positive for nausea and vomiting. Negative for abdominal pain and diarrhea.   Genitourinary: Negative for flank pain and hematuria.   Musculoskeletal: Negative for joint swelling.   Skin: Negative for pallor.   Neurological: Positive for dizziness and syncope. Negative for seizures and headaches.   All other systems reviewed and are negative.      Past Medical History:   Diagnosis Date   • GERD (gastroesophageal reflux disease)    • High blood pressure    • High cholesterol    • Insomnia    • Sleep apnea     NO CPAP   • Spinal headache        Allergies   Allergen Reactions   • Bupropion Seizure       Past Surgical History:   Procedure Laterality Date   • BACK SURGERY     • ENDOSCOPY N/A 6/22/2021    Procedure: ESOPHAGOGASTRODUODENOSCOPY with biopsy;  Surgeon: Satish Mejia MD;  Location: [facility] ENDOSCOPY;  Service: General;  Laterality: N/A;  SLIDING HIATAL HERNIA   • HIP SURGERY Left 01/2020    Total hip replacement   • JOINT REPLACEMENT      TOTAL HIP LEFT SIDE   • LUMBAR LAMINECTOMY  2001       Family History   Problem Relation Age of Onset   • Diabetes  Mother    • Heart disease Father        Social History     Socioeconomic History   • Marital status: Legally      Spouse name: Not on file   • Number of children: Not on file   • Years of education: Not on file   • Highest education level: Not on file   Tobacco Use   • Smoking status: Current Every Day Smoker     Packs/day: 1.00     Years: 15.00     Pack years: 15.00     Types: Cigarettes   • Smokeless tobacco: Never Used   Vaping Use   • Vaping Use: Never used   Substance and Sexual Activity   • Alcohol use: Never   • Drug use: Never   • Sexual activity: Defer           Objective   Physical Exam  Vitals and nursing note reviewed.   Constitutional:       General: He is not in acute distress.     Appearance: Normal appearance. He is not toxic-appearing.   HENT:      Head: Normocephalic and atraumatic.      Mouth/Throat:      Mouth: Mucous membranes are moist.   Eyes:      Extraocular Movements: Extraocular movements intact.      Pupils: Pupils are equal, round, and reactive to light.   Cardiovascular:      Rate and Rhythm: Normal rate and regular rhythm.      Pulses: Normal pulses.      Heart sounds: Normal heart sounds.   Pulmonary:      Effort: Pulmonary effort is normal. No respiratory distress.      Breath sounds: Normal breath sounds.   Abdominal:      General: Abdomen is flat.      Palpations: Abdomen is soft.      Tenderness: There is no abdominal tenderness.   Musculoskeletal:         General: Normal range of motion.      Cervical back: Normal range of motion and neck supple.   Skin:     General: Skin is warm and dry.   Neurological:      Mental Status: He is alert and oriented to person, place, and time. Mental status is at baseline.         Procedures           ED Course                                           MDM  Number of Diagnoses or Management Options  Acute kidney injury (CMS/HCC): new and requires workup  Intractable vomiting with nausea, unspecified vomiting type: established and  worsening     Amount and/or Complexity of Data Reviewed  Clinical lab tests: reviewed and ordered  Tests in the radiology section of CPT®: reviewed and ordered  Decide to obtain previous medical records or to obtain history from someone other than the patient: yes  Discuss the patient with other providers: yes (Charlene Grant, hospitalist)    Risk of Complications, Morbidity, and/or Mortality  Presenting problems: low  Diagnostic procedures: minimal  Management options: low    Patient Progress  Patient progress: stable      Final diagnoses:   Intractable vomiting with nausea, unspecified vomiting type   Acute kidney injury (CMS/HCC)       ED Disposition  ED Disposition     ED Disposition Condition Comment    Decision to Admit  Level of Care: Telemetry [5]   Diagnosis: Intractable vomiting with nausea, unspecified vomiting type [[id number]]   Admitting Physician: ITALO RIOS [[id number]]   Attending Physician: ITALO RIOS [[id number]]   Certification: I Certify That Inpatient Hospital Services Are Medically Necessary For Greater Than 2 Midnights            No follow-up provider specified.       Medication List      No changes were made to your prescriptions during this visit.          Sage Wolfe, APRN  07/07/21 0416

## 2021-07-07 NOTE — NURSING NOTE
Contacted  regarding patients suspicious behavior after visitor saw patient. Upon rounding with MICHEAL Cabrera, patient was completely A&Ox4 and appropriate. I entered the patient's room around 0920 to give medications and prepare him for the nuclear med test. At this time the patient was drowsy and sluggish, his speech was garbled as well. Patient was transported to [facility], shortly after Roxy called stating that the patient was drowsy and dosing. She said that she would continue with the procedure if she could keep him awake.  made aware.  said that if patient is too altered to bring him back for evaluation. Rashawn Mahoney RN

## 2021-07-08 VITALS
DIASTOLIC BLOOD PRESSURE: 81 MMHG | TEMPERATURE: 98.2 F | HEIGHT: 74 IN | BODY MASS INDEX: 25.15 KG/M2 | SYSTOLIC BLOOD PRESSURE: 145 MMHG | OXYGEN SATURATION: 100 % | RESPIRATION RATE: 16 BRPM | HEART RATE: 91 BPM | WEIGHT: 195.99 LBS

## 2021-07-08 LAB
ANION GAP SERPL CALCULATED.3IONS-SCNC: 7.3 MMOL/L (ref 5–15)
BASOPHILS # BLD AUTO: 0.05 10*3/MM3 (ref 0–0.2)
BASOPHILS NFR BLD AUTO: 0.6 % (ref 0–1.5)
BUN SERPL-MCNC: 19 MG/DL (ref 6–20)
BUN/CREAT SERPL: 12.8 (ref 7–25)
CALCIUM SPEC-SCNC: 9.2 MG/DL (ref 8.6–10.5)
CHLORIDE SERPL-SCNC: 98 MMOL/L (ref 98–107)
CO2 SERPL-SCNC: 28.7 MMOL/L (ref 22–29)
CREAT SERPL-MCNC: 1.49 MG/DL (ref 0.76–1.27)
DEPRECATED RDW RBC AUTO: 43.8 FL (ref 37–54)
EOSINOPHIL # BLD AUTO: 0.21 10*3/MM3 (ref 0–0.4)
EOSINOPHIL NFR BLD AUTO: 2.4 % (ref 0.3–6.2)
ERYTHROCYTE [DISTWIDTH] IN BLOOD BY AUTOMATED COUNT: 11.9 % (ref 12.3–15.4)
GFR SERPL CREATININE-BSD FRML MDRD: 52 ML/MIN/1.73
GLUCOSE SERPL-MCNC: 85 MG/DL (ref 65–99)
HCT VFR BLD AUTO: 36.2 % (ref 37.5–51)
HGB BLD-MCNC: 12 G/DL (ref 13–17.7)
IMM GRANULOCYTES # BLD AUTO: 0.02 10*3/MM3 (ref 0–0.05)
IMM GRANULOCYTES NFR BLD AUTO: 0.2 % (ref 0–0.5)
LYMPHOCYTES # BLD AUTO: 3.26 10*3/MM3 (ref 0.7–3.1)
LYMPHOCYTES NFR BLD AUTO: 38 % (ref 19.6–45.3)
MCH RBC QN AUTO: 32.9 PG (ref 26.6–33)
MCHC RBC AUTO-ENTMCNC: 33.1 G/DL (ref 31.5–35.7)
MCV RBC AUTO: 99.2 FL (ref 79–97)
MONOCYTES # BLD AUTO: 0.78 10*3/MM3 (ref 0.1–0.9)
MONOCYTES NFR BLD AUTO: 9.1 % (ref 5–12)
NEUTROPHILS NFR BLD AUTO: 4.27 10*3/MM3 (ref 1.7–7)
NEUTROPHILS NFR BLD AUTO: 49.7 % (ref 42.7–76)
NRBC BLD AUTO-RTO: 0 /100 WBC (ref 0–0.2)
PLATELET # BLD AUTO: 198 10*3/MM3 (ref 140–450)
PMV BLD AUTO: 10.6 FL (ref 6–12)
POTASSIUM SERPL-SCNC: 4.4 MMOL/L (ref 3.5–5.2)
RBC # BLD AUTO: 3.65 10*6/MM3 (ref 4.14–5.8)
SODIUM SERPL-SCNC: 134 MMOL/L (ref 136–145)
WBC # BLD AUTO: 8.59 10*3/MM3 (ref 3.4–10.8)

## 2021-07-08 PROCEDURE — 80048 BASIC METABOLIC PNL TOTAL CA: CPT | Performed by: PHYSICIAN ASSISTANT

## 2021-07-08 PROCEDURE — 85025 COMPLETE CBC W/AUTO DIFF WBC: CPT | Performed by: PHYSICIAN ASSISTANT

## 2021-07-08 PROCEDURE — 99239 HOSP IP/OBS DSCHRG MGMT >30: CPT | Performed by: INTERNAL MEDICINE

## 2021-07-08 RX ORDER — ONDANSETRON 4 MG/1
4 TABLET, FILM COATED ORAL EVERY 6 HOURS PRN
Qty: 16 TABLET | Refills: 0 | Status: SHIPPED | OUTPATIENT
Start: 2021-07-08 | End: 2022-10-11

## 2021-07-08 RX ADMIN — SODIUM CHLORIDE, POTASSIUM CHLORIDE, SODIUM LACTATE AND CALCIUM CHLORIDE 125 ML/HR: 600; 310; 30; 20 INJECTION, SOLUTION INTRAVENOUS at 10:10

## 2021-07-08 RX ADMIN — SODIUM CHLORIDE, PRESERVATIVE FREE 10 ML: 5 INJECTION INTRAVENOUS at 09:05

## 2021-07-08 RX ADMIN — PANTOPRAZOLE SODIUM 40 MG: 40 INJECTION, POWDER, FOR SOLUTION INTRAVENOUS at 09:05

## 2021-07-08 RX ADMIN — SODIUM CHLORIDE, POTASSIUM CHLORIDE, SODIUM LACTATE AND CALCIUM CHLORIDE 125 ML/HR: 600; 310; 30; 20 INJECTION, SOLUTION INTRAVENOUS at 02:04

## 2021-07-08 NOTE — DISCHARGE SUMMARY
[facility]         HOSPITALIST  DISCHARGE SUMMARY    Patient Name: Vasiliy Van  : 1980  MRN: [id number]    Date of Admission: 2021  Date of Discharge:  21    Primary Care Physician: Yajaira Dadren APRN    Consults     Date and Time Order Name Status Description    2021 12:52 AM Inpatient Nephrology Consult Completed     2021 11:16 PM Hospitalist (on-call MD unless specified) Completed           Final Diagnosis:  Intractable Nausea and vomiting, suspect viral gastroenteritis   Leukocytosis, resolved without antibiotics in setting of above   Prerenal BREN   Transaminitis, suspect secondary to above  +Amphetamines by UDS, declines any substance abuse  GERD and gastritis/duodenitis    Hospital Course     Hospital Course:  40M with recent EGD on  with gastritis and duodenitis who presented with intractable nausea and vomiting and presyncopal episodes.  Patient found to have BREN that resolved with IVF and increased po intake. Nephrology assisted with evaluation.  Nausea and vomiting resolved with prn zofran, initial npo status and slowly advancing diet.  A CT head was checked given the initial chronicity of his symptoms and concern for potential brain mass contributing to nausea and vomiting but imaging was unremarkable for acute pathology.   Patient tolerating po without issue by time of discharge.       Pt UDS +amphetamines but declines any substance abuse as he is in ~9mo of sobriety at this time.  There was concern during hospitalization of a few episodes of altered mentation around the time his girlfriend had come to visit.  He was without any further episodes for >24hrs by time of discharge.      Patient provided prn zofran and instructed on following up with his PCP for monitoring for resolution and repeating a CMP to recheck his creatinine and liver enzymes.  He was instructed on avoiding NSAIDs/nephrotoxic medications.      Patient discharged in stable condition  with close PCP follow up.   Return precautions and follow up discussed and patient voiced agreement and understanding of treatment plan.     CODE STATUS:  Code Status and Medical Interventions:   Ordered at: 07/07/21 0625     Level Of Support Discussed With:    Patient     Code Status:    CPR     Medical Interventions (Level of Support Prior to Arrest):    Full       DISCHARGE Follow Up Recommendations for labs and diagnostics:   -repeat CMP as outpatient to follow up renal function and for monitoring for resolution of transaminases      Day of Discharge     Vital Signs:  Temp:  [97.8 °F (36.6 °C)-98.8 °F (37.1 °C)] 98.1 °F (36.7 °C)  Heart Rate:  [82-95] 85  Resp:  [16-20] 16  BP: (103-138)/(66-77) 113/77    Physical Exam  Gen: awake, resting in bed, conversant  HENT: eomi, no scleral icterus, no discharge  Resp: CTAB, normal respiratory effort  CV: RRR, no LE pitting edema  GI: Abdomen soft, NT, ND, no guarding, +BS  Psych: appropriate mood and affect, aox3        Discharge Details        Discharge Medications      Continue These Medications      Instructions Start Date   ARIPiprazole 2 MG tablet  Commonly known as: ABILIFY   2 mg, Oral, Daily      atorvastatin 20 MG tablet  Commonly known as: LIPITOR   20 mg, Oral, Daily      Fetzima 120 MG capsule sustained-release 24 hr  Generic drug: Levomilnacipran HCl ER   120 mg, Oral, Daily      lisinopril 30 MG tablet  Commonly known as: PRINIVIL,ZESTRIL   30 mg, Oral, Daily      ondansetron 4 MG tablet  Commonly known as: Zofran   4 mg, Oral, Every 6 Hours PRN      pantoprazole 20 MG EC tablet  Commonly known as: PROTONIX   20 mg, Oral, 2 times daily      sucralfate 1 g tablet  Commonly known as: CARAFATE   1 g, Oral, 4 Times Daily      traZODone 100 MG tablet  Commonly known as: DESYREL   100 mg, Oral, Nightly         Stop These Medications    famotidine 20 MG tablet  Commonly known as: PEPCID              Discharge Disposition:  Home or Self Care    Diet: advance as  tolerated    Discharge Activity: advance as tolerated    Future Appointments   Date Time Provider Department Center   7/21/2021  8:00 AM [facility] 2 (DUAL HEAD) [facility]       Additional Instructions for the Follow-ups that You Need to Schedule     Discharge Follow-up with PCP   As directed       Currently Documented PCP:    Yajaira Darden APRN    PCP Phone Number:    [phone]     Follow Up Details: 5-7 days               Pertinent  and/or Most Recent Results     PROCEDURES:       LAB RESULTS:      Lab 07/08/21  0505 07/07/21 0437 07/06/21  2230 07/06/21  1526   WBC 8.59 10.86*  --  19.48*   HEMOGLOBIN 12.0* 13.6  --  15.2   HEMATOCRIT 36.2* 41.7  --  46.0   PLATELETS 198 221  --  296   NEUTROS ABS 4.27 5.68  --  14.99*   IMMATURE GRANS (ABS) 0.02 0.04  --  0.10*   LYMPHS ABS 3.26* 3.84*  --  2.70   MONOS ABS 0.78 1.05*  --  1.61*   EOS ABS 0.21 0.19  --  0.03   MCV 99.2* 101.0*  --  98.9*   LACTATE  --   --  1.2  --          Lab 07/08/21  0505 07/07/21  0437 07/06/21  1526   SODIUM 134* 134* 135*   POTASSIUM 4.4 4.9 5.2   CHLORIDE 98 98 92*   CO2 28.7 29.4* 27.5   ANION GAP 7.3 6.6 15.5*   BUN 19 24* 26*   CREATININE 1.49* 2.00* 3.13*   GLUCOSE 85 79 105*   CALCIUM 9.2 9.0 10.4         Lab 07/06/21  1526   TOTAL PROTEIN 7.9   ALBUMIN 4.70   GLOBULIN 3.2   ALT (SGPT) 72*   AST (SGOT) 96*   BILIRUBIN 0.5   ALK PHOS 85   LIPASE 17                     Brief Urine Lab Results  (Last result in the past 365 days)      Color   Clarity   Blood   Leuk Est   Nitrite   Protein   CREAT   Urine HCG        07/07/21 1634             111.1           Microbiology Results (last 10 days)     Procedure Component Value - Date/Time    Blood Culture - Blood, Arm, Right [[id number]] Collected: 07/06/21 2245    Lab Status: Preliminary result Specimen: Blood from Arm, Right Updated: 07/07/21 2300     Blood Culture No growth at 24 hours    Blood Culture - Blood, Arm, Left [[id number]] Collected: 07/06/21 2230    Lab Status:  Preliminary result Specimen: Blood from Arm, Left Updated: 07/07/21 9495     Blood Culture No growth at 24 hours          RADIOLOGY:       PROCEDURE: CT HEAD WO CONTRAST       COMPARISON: None   INDICATIONS: AMS and nausea and vomiting possible elevated ICP       PROTOCOL:   Standard imaging protocol performed       RADIATION:       MA and/or KV was adjusted to minimize radiation dose.           TECHNIQUE: After obtaining the patient's consent, CT images were obtained without non-ionic   intravenous contrast material.       FINDINGS:   CEREBRUM: No edema, hemorrhage, mass, acute infarction, or inappropriate atrophy.     CEREBELLUM: No edema, hemorrhage, mass, acute infarction, or inappropriate atrophy.     BRAINSTEM: No edema, hemorrhage, mass, acute infarction, or inappropriate atrophy.     CSF SPACES: Ventricles, cisterns, and sulci are appropriate for age.  No hydrocephalus,   subarachnoid hemorrhage, or mass.     SKULL: No mass or other significant visible lesion.     SINUSES: Limited views demonstrate no significant mucosal thickening or fluid.     ORBITS: Limited views are unremarkable.     OTHER: Negative.         CONCLUSION:   1. No acute intracranial pathology.                ANTHONY CMDOWELL MD         Electronically Signed and Approved By: ANTHONY MCDOWELL MD on 7/07/2021 at 16:56                  CT Abdomen Pelvis Without Contrast [[id number]] [facility] as Reviewed   Order Status: Completed Collected: 07/06/21 2304    Updated: 07/06/21 2307   Narrative:     PROCEDURE: CT ABDOMEN PELVIS WO CONTRAST       COMPARISON: None       INDICATIONS: vomiting, elevated wbc       TECHNIQUE: CT images were created without intravenous contrast.         PROTOCOL:   Standard imaging protocol performed       RADIATION:   DLP: 527.1mGy*cm     Automated exposure control was utilized to minimize radiation dose.       FINDINGS:   Abdomen:  Included lung bases are clear.       Liver, spleen, kidneys, adrenal glands, pancreas and  gallbladder have an unremarkable unenhanced   appearance.  Bowel loops are nondilated.  Normal appendix.  No radiodense urinary system calculus   or hydronephrosis.       Pelvis:  No radiodense bladder calculus.  No pelvic mass or fluid.  No aggressive appearing bone   change.       CONCLUSION: No clearly acute finding                DORI COPELAND MD         Electronically Signed and Approved By: DORI COPELAND MD on 7/06/2021 at 23:04                                Labs Pending at Discharge:    Pending Labs     Order Current Status    Blood Culture - Blood, Arm, Left Preliminary result    Blood Culture - Blood, Arm, Right Preliminary result            Time spent on Discharge including face to face service:  35 minutes

## 2021-07-08 NOTE — PROGRESS NOTES
" LOS: 1 day   Patient Care Team:  Yajaira Darden APRN as PCP - General (Nurse Practitioner)    Chief Complaint: BREN    Subjective   Pt doing well without any new complaints  N/V improved    History of Present Illness    Subjective:  Symptoms:  No shortness of breath, chest pain, chest pressure or anxiety.    Pain:  He reports no pain.        History taken from: patient    Objective     Vital Sign Min/Max for last 24 hours  Temp  Min: 97.8 °F (36.6 °C)  Max: 98.8 °F (37.1 °C)   BP  Min: 103/67  Max: 138/72   Pulse  Min: 82  Max: 95   Resp  Min: 16  Max: 20   SpO2  Min: 94 %  Max: 99 %   No data recorded   No data recorded     Flowsheet Rows      First Filed Value   Admission Height  188 cm (74\") Documented at 07/06/2021 1504   Admission Weight  86.8 kg (191 lb 5.8 oz) Documented at 07/06/2021 1504          No intake/output data recorded.  I/O last 3 completed shifts:  In: 3276 [P.O.:480; I.V.:1796; IV Piggyback:1000]  Out: 500 [Urine:500]    Objective:  General Appearance:  Comfortable.    Vital signs: (most recent): Blood pressure 115/66, pulse 82, temperature 97.8 °F (36.6 °C), temperature source Oral, resp. rate 20, height 188 cm (74\"), weight 88.9 kg (195 lb 15.8 oz), SpO2 99 %.  Vital signs are normal.    Output: Producing urine.    HEENT: Normal HEENT exam.    Lungs:  Normal effort and normal respiratory rate.  Breath sounds clear to auscultation.    Heart: Normal rate.  Regular rhythm.    Abdomen: Abdomen is soft.  Bowel sounds are normal.   There is no abdominal tenderness.     Extremities: Normal range of motion.  There is no dependent edema.    Pulses: Distal pulses are intact.    Neurological: Patient is alert and oriented to person, place and time.    Skin:  Warm and dry.              Results Review:     I reviewed the patient's new clinical results.    WBC WBC   Date Value Ref Range Status   07/08/2021 8.59 3.40 - 10.80 10*3/mm3 Final   07/07/2021 10.86 (H) 3.40 - 10.80 10*3/mm3 Final   07/06/2021 " 19.48 (H) 3.40 - 10.80 10*3/mm3 Final      HGB Hemoglobin   Date Value Ref Range Status   07/08/2021 12.0 (L) 13.0 - 17.7 g/dL Final   07/07/2021 13.6 13.0 - 17.7 g/dL Final   07/06/2021 15.2 13.0 - 17.7 g/dL Final      HCT Hematocrit   Date Value Ref Range Status   07/08/2021 36.2 (L) 37.5 - 51.0 % Final   07/07/2021 41.7 37.5 - 51.0 % Final   07/06/2021 46.0 37.5 - 51.0 % Final      Platlets No results found for: LABPLAT   MCV MCV   Date Value Ref Range Status   07/08/2021 99.2 (H) 79.0 - 97.0 fL Final   07/07/2021 101.0 (H) 79.0 - 97.0 fL Final   07/06/2021 98.9 (H) 79.0 - 97.0 fL Final          Sodium Sodium   Date Value Ref Range Status   07/08/2021 134 (L) 136 - 145 mmol/L Final   07/07/2021 134 (L) 136 - 145 mmol/L Final   07/06/2021 135 (L) 136 - 145 mmol/L Final      Potassium Potassium   Date Value Ref Range Status   07/08/2021 4.4 3.5 - 5.2 mmol/L Final   07/07/2021 4.9 3.5 - 5.2 mmol/L Final   07/06/2021 5.2 3.5 - 5.2 mmol/L Final      Chloride Chloride   Date Value Ref Range Status   07/08/2021 98 98 - 107 mmol/L Final   07/07/2021 98 98 - 107 mmol/L Final   07/06/2021 92 (L) 98 - 107 mmol/L Final      CO2 CO2   Date Value Ref Range Status   07/08/2021 28.7 22.0 - 29.0 mmol/L Final   07/07/2021 29.4 (H) 22.0 - 29.0 mmol/L Final   07/06/2021 27.5 22.0 - 29.0 mmol/L Final      BUN BUN   Date Value Ref Range Status   07/08/2021 19 6 - 20 mg/dL Final   07/07/2021 24 (H) 6 - 20 mg/dL Final   07/06/2021 26 (H) 6 - 20 mg/dL Final      Creatinine Creatinine   Date Value Ref Range Status   07/08/2021 1.49 (H) 0.76 - 1.27 mg/dL Final   07/07/2021 2.00 (H) 0.76 - 1.27 mg/dL Final   07/06/2021 3.13 (H) 0.76 - 1.27 mg/dL Final      Calcium Calcium   Date Value Ref Range Status   07/08/2021 9.2 8.6 - 10.5 mg/dL Final   07/07/2021 9.0 8.6 - 10.5 mg/dL Final   07/06/2021 10.4 8.6 - 10.5 mg/dL Final      PO4 No results found for: CAPO4   Albumin Albumin   Date Value Ref Range Status   07/06/2021 4.70 3.50 - 5.20 g/dL  Final      Magnesium No results found for: MG   Uric Acid No results found for: URICACID     Medication Review: y    Assessment/Plan       Nausea with vomiting      Assessment & Plan  BREN-  Appears secondary to volume depletion and hypotension.  Creatinine improved to 1.49 today.  Urinalysis is bland.  Continue IV fluids.   Hypotension- secondary to volume depletion and blood pressure medications, resolved.  Medications are on hold.  Monitor.  Intractable nausea and vomiting- possibly secondary to esophagitis/gastritis/duodenitis, per GI.    Eros Hayes MD  07/08/21  08:53 EDT

## 2021-07-08 NOTE — PLAN OF CARE
Goal Outcome Evaluation:         Patient is AOx4 is tolerating diet well has not complained of any pain or nausea, not drowsy today. Vitals are stable. Ashley Silverman RN

## 2021-07-11 LAB
BACTERIA SPEC AEROBE CULT: NORMAL
BACTERIA SPEC AEROBE CULT: NORMAL

## 2021-07-21 ENCOUNTER — HOSPITAL ENCOUNTER (OUTPATIENT)
Dept: NUCLEAR MEDICINE | Facility: HOSPITAL | Age: 41
Discharge: HOME | End: 2021-07-21
Admitting: NURSE PRACTITIONER

## 2021-07-21 DIAGNOSIS — R10.9 ABDOMINAL PAIN, UNSPECIFIED ABDOMINAL LOCATION: ICD-10-CM

## 2021-07-21 PROCEDURE — 0 TECHNETIUM TC 99M MEBROFENIN KIT: Performed by: NURSE PRACTITIONER

## 2021-07-21 PROCEDURE — A9537 TC99M MEBROFENIN: HCPCS | Performed by: NURSE PRACTITIONER

## 2021-07-21 PROCEDURE — 78227 HEPATOBIL SYST IMAGE W/DRUG: CPT | Performed by: RADIOLOGY

## 2021-07-21 PROCEDURE — 78227 HEPATOBIL SYST IMAGE W/DRUG: CPT

## 2021-07-21 RX ORDER — KIT FOR THE PREPARATION OF TECHNETIUM TC 99M MEBROFENIN 45 MG/10ML
1 INJECTION, POWDER, LYOPHILIZED, FOR SOLUTION INTRAVENOUS
Status: COMPLETED | OUTPATIENT
Start: 2021-07-21 | End: 2021-07-21

## 2021-07-21 RX ADMIN — KIT FOR THE PREPARATION OF TECHNETIUM TC 99M MEBROFENIN 1 DOSE: 45 INJECTION, POWDER, LYOPHILIZED, FOR SOLUTION INTRAVENOUS at 09:15

## 2021-12-09 ENCOUNTER — HOSPITAL ENCOUNTER (EMERGENCY)
Facility: HOSPITAL | Age: 41
Discharge: HOME | End: 2021-12-09
Attending: EMERGENCY MEDICINE | Admitting: EMERGENCY MEDICINE

## 2021-12-09 ENCOUNTER — APPOINTMENT (OUTPATIENT)
Dept: GENERAL RADIOLOGY | Facility: HOSPITAL | Age: 41
End: 2021-12-09

## 2021-12-09 VITALS
BODY MASS INDEX: 26.91 KG/M2 | HEIGHT: 74 IN | TEMPERATURE: 98.3 F | RESPIRATION RATE: 20 BRPM | SYSTOLIC BLOOD PRESSURE: 126 MMHG | DIASTOLIC BLOOD PRESSURE: 77 MMHG | OXYGEN SATURATION: 98 % | WEIGHT: 209.66 LBS | HEART RATE: 111 BPM
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
## 2021-12-09 DIAGNOSIS — S92.514A CLOSED NONDISPLACED FRACTURE OF PROXIMAL PHALANX OF LESSER TOE OF RIGHT FOOT, INITIAL ENCOUNTER: Primary | ICD-10-CM

## 2021-12-09 DIAGNOSIS — M79.671 RIGHT FOOT PAIN: ICD-10-CM

## 2021-12-09 PROCEDURE — 99283 EMERGENCY DEPT VISIT LOW MDM: CPT

## 2021-12-09 PROCEDURE — 73630 X-RAY EXAM OF FOOT: CPT

## 2021-12-09 NOTE — DISCHARGE INSTRUCTIONS
Elevate as much as possible, apply ice for 20mins on and 20mins off for the next several days. Take OTC tylenol/ibuprofen as needed fro pain. Wear the walking boot and use the crutches while ambulating for stability. Call Dr Resendez's office to schedule a follow up appointment in the next week or two, return to the ED for worsening or new symptoms of concern.

## 2021-12-09 NOTE — ED PROVIDER NOTES
Subjective   Fell down about 4-5 steps last night, injured right foot. Having pain with ambulation today. States he applied ice and elevated last night and took some ibuprofen with some relief.      History provided by:  Patient   used: No        Review of Systems   Constitutional: Negative.    HENT: Negative.    Eyes: Negative.    Respiratory: Negative.    Cardiovascular: Negative.    Gastrointestinal: Negative.    Endocrine: Negative.    Genitourinary: Negative.    Musculoskeletal: Negative.    Skin: Negative.    Allergic/Immunologic: Negative.    Neurological: Negative.    Hematological: Negative.    Psychiatric/Behavioral: Negative.        Past Medical History:   Diagnosis Date   • GERD (gastroesophageal reflux disease)    • High blood pressure    • High cholesterol    • Insomnia    • Sleep apnea     NO CPAP   • Spinal headache        Allergies   Allergen Reactions   • Bupropion Seizure       Past Surgical History:   Procedure Laterality Date   • BACK SURGERY     • ENDOSCOPY N/A 6/22/2021    Procedure: ESOPHAGOGASTRODUODENOSCOPY with biopsy;  Surgeon: Satish Mejia MD;  Location: [facility] ENDOSCOPY;  Service: General;  Laterality: N/A;  SLIDING HIATAL HERNIA   • HIP SURGERY Left 01/2020    Total hip replacement   • JOINT REPLACEMENT      TOTAL HIP LEFT SIDE   • LUMBAR LAMINECTOMY  2001       Family History   Problem Relation Age of Onset   • Diabetes Mother    • Heart disease Father        Social History     Socioeconomic History   • Marital status: Legally    Tobacco Use   • Smoking status: Current Every Day Smoker     Packs/day: 1.00     Years: 15.00     Pack years: 15.00     Types: Cigarettes   • Smokeless tobacco: Never Used   Vaping Use   • Vaping Use: Never used   Substance and Sexual Activity   • Alcohol use: Never   • Drug use: Never   • Sexual activity: Defer           Objective   Physical Exam  Vitals and nursing note reviewed.   Constitutional:       Appearance: Normal  appearance.   HENT:      Head: Normocephalic and atraumatic.      Nose: Nose normal.      Mouth/Throat:      Mouth: Mucous membranes are moist.   Eyes:      Pupils: Pupils are equal, round, and reactive to light.   Cardiovascular:      Rate and Rhythm: Normal rate and regular rhythm.      Pulses: Normal pulses.   Pulmonary:      Effort: Pulmonary effort is normal.      Breath sounds: Normal breath sounds.   Abdominal:      General: Abdomen is flat. Bowel sounds are normal.      Palpations: Abdomen is soft.   Musculoskeletal:      Cervical back: Normal range of motion.        Feet:       Comments: Bruising/tenderness noted to the base of the 4th toe and to surrounding area on dorsum of foot.   Skin:     General: Skin is warm and dry.      Capillary Refill: Capillary refill takes less than 2 seconds.   Neurological:      General: No focal deficit present.      Mental Status: He is alert and oriented to person, place, and time. Mental status is at baseline.   Psychiatric:         Mood and Affect: Mood normal.         Procedures           ED Course                                                 MDM  Number of Diagnoses or Management Options  Closed nondisplaced fracture of proximal phalanx of lesser toe of right foot, initial encounter: new and requires workup  Fall down steps, initial encounter: minor  Right foot pain: new and requires workup     Amount and/or Complexity of Data Reviewed  Tests in the radiology section of CPT®: reviewed and ordered    Risk of Complications, Morbidity, and/or Mortality  Presenting problems: low  Diagnostic procedures: low  Management options: low    Patient Progress  Patient progress: stable      Final diagnoses:   Closed nondisplaced fracture of proximal phalanx of lesser toe of right foot, initial encounter   Right foot pain   Fall down steps, initial encounter       ED Disposition  ED Disposition     ED Disposition Condition Comment    Discharge Stable           José Resendez  TOMMIE Tirado  [address]  [place] [address]  [phone]      Call for a follow up in the next week or two         Medication List      No changes were made to your prescriptions during this visit.          Rosey Mancera, APRN  12/09/21 1509

## 2022-01-11 ENCOUNTER — HOSPITAL ENCOUNTER (EMERGENCY)
Facility: HOSPITAL | Age: 42
Discharge: LEFT BEFORE DISCHARGE | End: 2022-01-11

## 2022-01-11 VITALS
SYSTOLIC BLOOD PRESSURE: 115 MMHG | BODY MASS INDEX: 28.32 KG/M2 | HEIGHT: 74 IN | WEIGHT: 220.68 LBS | DIASTOLIC BLOOD PRESSURE: 68 MMHG | TEMPERATURE: 98.1 F | OXYGEN SATURATION: 99 % | RESPIRATION RATE: 17 BRPM | HEART RATE: 96 BPM

## 2022-01-11 PROCEDURE — 99211 OFF/OP EST MAY X REQ PHY/QHP: CPT

## 2022-01-11 NOTE — ED NOTES
Patient states a fall 4 weeks ago, patient was seen in this department at that time. No hip injury noted but patient did have a broken toe. Patient states he has had a total left hip and knows he needs a total right. States most days he can handle the pain, today he can not     Carmen Melendez, MICHEAL  01/11/22 [id number]

## 2022-02-15 ENCOUNTER — APPOINTMENT (OUTPATIENT)
Dept: GENERAL RADIOLOGY | Facility: HOSPITAL | Age: 42
End: 2022-02-15

## 2022-02-15 ENCOUNTER — HOSPITAL ENCOUNTER (EMERGENCY)
Facility: HOSPITAL | Age: 42
Discharge: HOME | End: 2022-02-15
Attending: EMERGENCY MEDICINE | Admitting: EMERGENCY MEDICINE

## 2022-02-15 VITALS
RESPIRATION RATE: 16 BRPM | HEART RATE: 102 BPM | SYSTOLIC BLOOD PRESSURE: 127 MMHG | OXYGEN SATURATION: 98 % | TEMPERATURE: 97.7 F | HEIGHT: 74 IN | WEIGHT: 222.66 LBS | BODY MASS INDEX: 28.58 KG/M2 | DIASTOLIC BLOOD PRESSURE: 69 MMHG

## 2022-02-15 DIAGNOSIS — M87.00 AVASCULAR NECROSIS: Primary | ICD-10-CM

## 2022-02-15 DIAGNOSIS — M25.551 RIGHT HIP PAIN: ICD-10-CM

## 2022-02-15 PROCEDURE — 73502 X-RAY EXAM HIP UNI 2-3 VIEWS: CPT

## 2022-02-15 PROCEDURE — 25010000002 DEXAMETHASONE PER 1 MG: Performed by: EMERGENCY MEDICINE

## 2022-02-15 PROCEDURE — 96372 THER/PROPH/DIAG INJ SC/IM: CPT

## 2022-02-15 PROCEDURE — 99282 EMERGENCY DEPT VISIT SF MDM: CPT

## 2022-02-15 PROCEDURE — 25010000002 KETOROLAC TROMETHAMINE PER 15 MG: Performed by: EMERGENCY MEDICINE

## 2022-02-15 RX ORDER — KETOROLAC TROMETHAMINE 15 MG/ML
15 INJECTION, SOLUTION INTRAMUSCULAR; INTRAVENOUS ONCE
Status: DISCONTINUED | OUTPATIENT
Start: 2022-02-15 | End: 2022-02-15

## 2022-02-15 RX ORDER — KETOROLAC TROMETHAMINE 10 MG/1
10 TABLET, FILM COATED ORAL EVERY 6 HOURS PRN
Qty: 20 TABLET | Refills: 0 | Status: SHIPPED | OUTPATIENT
Start: 2022-02-15 | End: 2022-10-11

## 2022-02-15 RX ORDER — DEXAMETHASONE SODIUM PHOSPHATE 10 MG/ML
10 INJECTION INTRAMUSCULAR; INTRAVENOUS ONCE
Status: COMPLETED | OUTPATIENT
Start: 2022-02-15 | End: 2022-02-15

## 2022-02-15 RX ORDER — HYDROCODONE BITARTRATE AND ACETAMINOPHEN 7.5; 325 MG/1; MG/1
1 TABLET ORAL EVERY 6 HOURS PRN
Qty: 12 TABLET | Refills: 0 | Status: SHIPPED | OUTPATIENT
Start: 2022-02-15 | End: 2022-10-11

## 2022-02-15 RX ORDER — KETOROLAC TROMETHAMINE 30 MG/ML
30 INJECTION, SOLUTION INTRAMUSCULAR; INTRAVENOUS ONCE
Status: COMPLETED | OUTPATIENT
Start: 2022-02-15 | End: 2022-02-15

## 2022-02-15 RX ADMIN — DEXAMETHASONE SODIUM PHOSPHATE 10 MG: 10 INJECTION INTRAMUSCULAR; INTRAVENOUS at 21:13

## 2022-02-15 RX ADMIN — KETOROLAC TROMETHAMINE 30 MG: 30 INJECTION, SOLUTION INTRAMUSCULAR; INTRAVENOUS at 21:14

## 2022-02-16 NOTE — DISCHARGE INSTRUCTIONS
Please follow-up with Dr. Mendez, the orthopedic surgeon whose information has been provided for you, for your avascular necrosis.  Please call his office tomorrow and try to make the first available appointment.  Please take medications as directed.  Return to the ER if you develop worsening of pain, become unable to use your right lower extremity, develop a fever that cannot be controlled with Tylenol or Motrin, or experience any discoloration to right lower extremity or loss of sensation.

## 2022-02-16 NOTE — ED PROVIDER NOTES
Subjective   Patient is a 41-year-old male that presents emergency department night via POV complaining of right hip pain.  Patient has known history of avascular necrosis to both the right and left hip.  Patient has had a total left hip replacement however has not undergone surgical intervention on the right hip.  Patient states his right hip has been hurting for the past 3 to 4 days however it progressively gotten worse.  He rates his current pain as an 8 on a scale of 0-10.  He has taken over-the-counter Tylenol and Motrin for the discomfort but has had no relief.      History provided by:  Parent   used: No        Review of Systems   Constitutional: Negative for chills and fever.   HENT: Negative for congestion, ear pain and sore throat.    Eyes: Negative for pain.   Respiratory: Negative for cough, chest tightness and shortness of breath.    Cardiovascular: Negative for chest pain.   Gastrointestinal: Negative for abdominal pain, diarrhea, nausea and vomiting.   Genitourinary: Negative for flank pain and hematuria.   Musculoskeletal: Negative for joint swelling.        Right hip pain that worsens with bearing weight and ambulation   Skin: Negative for color change and pallor.   Neurological: Negative for seizures and headaches.   All other systems reviewed and are negative.      Past Medical History:   Diagnosis Date   • Avascular necrosis (HCC)    • GERD (gastroesophageal reflux disease)    • High blood pressure    • High cholesterol    • Insomnia    • Sleep apnea     NO CPAP   • Spinal headache        Allergies   Allergen Reactions   • Bupropion Seizure       Past Surgical History:   Procedure Laterality Date   • BACK SURGERY     • ENDOSCOPY N/A 6/22/2021    Procedure: ESOPHAGOGASTRODUODENOSCOPY with biopsy;  Surgeon: Satish Mejia MD;  Location: [facility] ENDOSCOPY;  Service: General;  Laterality: N/A;  SLIDING HIATAL HERNIA   • HIP SURGERY Left 01/2020    Total hip replacement   • JOINT  REPLACEMENT      TOTAL HIP LEFT SIDE   • LUMBAR LAMINECTOMY  2001       Family History   Problem Relation Age of Onset   • Diabetes Mother    • Heart disease Father        Social History     Socioeconomic History   • Marital status: Legally    Tobacco Use   • Smoking status: Current Every Day Smoker     Packs/day: 1.00     Years: 15.00     Pack years: 15.00     Types: Cigarettes   • Smokeless tobacco: Never Used   Vaping Use   • Vaping Use: Never used   Substance and Sexual Activity   • Alcohol use: Never   • Drug use: Never   • Sexual activity: Defer           Objective   Physical Exam  Vitals and nursing note reviewed.   Constitutional:       General: He is not in acute distress.     Appearance: Normal appearance. He is not toxic-appearing.   HENT:      Head: Normocephalic and atraumatic.      Mouth/Throat:      Mouth: Mucous membranes are moist.   Eyes:      General: No scleral icterus.  Cardiovascular:      Rate and Rhythm: Normal rate and regular rhythm.      Pulses: Normal pulses.      Heart sounds: Normal heart sounds.   Pulmonary:      Effort: Pulmonary effort is normal. No respiratory distress.      Breath sounds: Normal breath sounds.   Abdominal:      General: Abdomen is flat.      Palpations: Abdomen is soft.      Tenderness: There is no abdominal tenderness.   Musculoskeletal:         General: Tenderness present. No deformity or signs of injury. Normal range of motion.      Cervical back: Normal range of motion and neck supple.   Skin:     General: Skin is warm and dry.      Capillary Refill: Capillary refill takes 2 to 3 seconds.   Neurological:      Mental Status: He is alert and oriented to person, place, and time. Mental status is at baseline.         Procedures           ED Course  ED Course as of 02/16/22 2020   Tue Feb 15, 2022   2221 Pt ambulated to rest room by self and was able to bear weight.  [MS]      ED Course User Index  [MS] Luba Do, JOSEPHINE                                                  MDM  Number of Diagnoses or Management Options  Avascular necrosis (HCC)  Right hip pain  Diagnosis management comments: I have spoke with the patient and I have explained the patient´s condition, diagnoses and treatment plan based on the information available to me at this time. I have answered all questions and addressed any concerns. The patient has a good understanding of the patient´s diagnosis, condition, and treatment plan as can be expected at this point. The vital signs have been stable. The patient´s condition is stable and appropriate for discharge from the emergency department.      The patient will pursue further outpatient evaluation with the primary care physician or other designated or consulting physician as outlined in the discharge instructions. They are agreeable to this plan of care and follow-up instructions have been explained in detail. The patient has received these instructions in written format and have expressed an understanding of the discharge instructions. The patient is aware that any significant change in condition or worsening of symptoms should prompt an immediate return to this or the closest emergency department or call to 911.         Amount and/or Complexity of Data Reviewed  Tests in the radiology section of CPT®: reviewed and ordered  Review and summarize past medical records: yes (I have personally reviewed patient's previous medical encounters.  )  Discuss the patient with other providers: (Patient CT results were discussed with ER attending Dr. Palumbo regarding acuity of pt's avascular necrosis if an immediate consult was necessary )    Risk of Complications, Morbidity, and/or Mortality  Presenting problems: low  Diagnostic procedures: low  Management options: low    Patient Progress  Patient progress: stable      Final diagnoses:   Avascular necrosis (HCC)   Right hip pain       ED Disposition  ED Disposition     ED Disposition Condition Comment     Discharge Stable           Shantanu Mendez MD  [address] [place] [id number]  [phone]    In 3 days  As needed, If symptoms worsen         Medication List      New Prescriptions    HYDROcodone-acetaminophen 7.5-325 MG per tablet  Commonly known as: NORCO  Take 1 tablet by mouth Every 6 (Six) Hours As Needed for Moderate Pain .     ketorolac 10 MG tablet  Commonly known as: TORADOL  Take 1 tablet by mouth Every 6 (Six) Hours As Needed for Mild Pain .           Where to Get Your Medications      These medications were sent to [facility] [place], [place] - [address]  [facility] - [phone]  - [phone]   [address] [facility] [address]    Phone: [phone]   · HYDROcodone-acetaminophen 7.5-325 MG per tablet  · ketorolac 10 MG tablet          Luba Do, APRN  02/16/22 2020

## 2022-02-16 NOTE — ED PROVIDER NOTES
"Patient is 41 y.o. year old male that presents to the ED for evaluation of right hip pain due to avascular necrosis.     Physical Exam    ED Course:    /69   Pulse 102   Temp 97.7 °F (36.5 °C) (Oral)   Resp 16   Ht 188 cm (74\")   Wt 101 kg (222 lb 10.6 oz)   SpO2 98%   BMI 28.59 kg/m²   Results for orders placed or performed during the hospital encounter of 08/12/21   POCT SARS-CoV-2 Antigen SHY   ([facility])    Specimen: Swab   Result Value Ref Range    SARS Antigen Not Detected Not Detected    Internal Control Passed Passed    Lot Number [id number]     Expiration Date 10,923      Medications   dexamethasone (DECADRON) injection 10 mg (10 mg Intramuscular Given 2/15/22 2113)   ketorolac (TORADOL) injection 30 mg (30 mg Intramuscular Given 2/15/22 2114)     XR Hip With or Without Pelvis 2 - 3 View Right    Result Date: 2/15/2022  Narrative: PROCEDURE: XR HIP W OR WO PELVIS 2-3 VIEW RIGHT  COMPARISON: [facility], CT, CT ABDOMEN PELVIS WO CONTRAST, 7/06/2021, 22:56.  INDICATIONS: RIGHT HIP PAIN NKI  FINDINGS:  There is sclerosis at the right femoral head related to avascular necrosis.  There is no femoral head collapse.  The right hip joint space is maintained.  The remaining bony pelvis appears intact.  There is normal alignment of the left hip prosthesis.  The iliopectineal and ilioischial lines are intact.      Impression:   1. Negative for acute fracture. 2. Avascular necrosis at the right femoral head.  No femoral head collapse.     CORI MAURICIO MD       Electronically Signed and Approved By: CORI MAURICIO MD on 2/15/2022 at 20:43               MDM:    Procedures      The case was discussed between the AVA and myself. Patient  care including, but not limited to ordered imaging, medications, and lab results were reviewed. I then performed the substantive portion of the visit including all aspects of the medical decision making.        Martín Palumbo DO  22:32 EST  02/15/22     "   Martín Palumbo,   02/15/22 2233

## 2022-02-18 ENCOUNTER — HOSPITAL ENCOUNTER (EMERGENCY)
Facility: HOSPITAL | Age: 42
Discharge: LEFT BEFORE DISCHARGE | End: 2022-02-18
Attending: EMERGENCY MEDICINE

## 2022-02-18 VITALS
SYSTOLIC BLOOD PRESSURE: 129 MMHG | HEIGHT: 74 IN | OXYGEN SATURATION: 97 % | RESPIRATION RATE: 18 BRPM | TEMPERATURE: 98.3 F | HEART RATE: 94 BPM | BODY MASS INDEX: 28.89 KG/M2 | DIASTOLIC BLOOD PRESSURE: 78 MMHG | WEIGHT: 225.09 LBS

## 2022-02-18 PROCEDURE — 99211 OFF/OP EST MAY X REQ PHY/QHP: CPT | Performed by: EMERGENCY MEDICINE

## 2022-02-21 ENCOUNTER — OFFICE VISIT (OUTPATIENT)
Dept: ORTHOPEDIC SURGERY | Facility: CLINIC | Age: 42
End: 2022-02-21

## 2022-02-21 VITALS — OXYGEN SATURATION: 98 % | HEIGHT: 74 IN | BODY MASS INDEX: 28.88 KG/M2 | HEART RATE: 108 BPM | WEIGHT: 225 LBS

## 2022-02-21 DIAGNOSIS — M25.551 RIGHT HIP PAIN: ICD-10-CM

## 2022-02-21 DIAGNOSIS — M87.00 AVN (AVASCULAR NECROSIS OF BONE): Primary | ICD-10-CM

## 2022-02-21 DIAGNOSIS — M16.11 OSTEOARTHRITIS OF RIGHT HIP, UNSPECIFIED OSTEOARTHRITIS TYPE: ICD-10-CM

## 2022-02-21 PROCEDURE — 99204 OFFICE O/P NEW MOD 45 MIN: CPT | Performed by: STUDENT IN AN ORGANIZED HEALTH CARE EDUCATION/TRAINING PROGRAM

## 2022-02-21 NOTE — PROGRESS NOTES
"Chief Complaint  Pain of the Right Hip    Subjective          Vasiliy Van presents to [facility] for   History of Present Illness    The patient presents here today for evaluation of the right hip. The patient reports he has had hip pain since 2019. He has had a left hip replacement in 2020 in [place] for osteonecrosis that failed non operative managmenet. He states at that time his right side was worse but left was more painful. He reports his right hip has increased in pain recently and he is unable to do his daily activities. He is a smoker. He is not a drinker. He has no other complaints. The ER put him on Toradol and norco. He has finished those. He has been taking ibuprofen and tylenol.     Allergies   Allergen Reactions   • Bupropion Seizure        Social History     Socioeconomic History   • Marital status: Legally    Tobacco Use   • Smoking status: Current Every Day Smoker     Packs/day: 1.00     Years: 15.00     Pack years: 15.00     Types: Cigarettes   • Smokeless tobacco: Never Used   Vaping Use   • Vaping Use: Never used   Substance and Sexual Activity   • Alcohol use: Never   • Drug use: Never   • Sexual activity: Defer        I reviewed the patient's chief complaint, history of present illness, review of systems, past medical history, surgical history, family history, social history, medications, and allergy list.     REVIEW OF SYSTEMS    Constitutional: Denies fevers, chills, weight loss  Cardiovascular: Denies chest pain, shortness of breath  Skin: Denies rashes, acute skin changes  Neurologic: Denies headache, loss of consciousness  MSK: Right hip pain      Objective   Vital Signs:   Pulse 108   Ht 188 cm (74\")   Wt 102 kg (225 lb)   SpO2 98%   BMI 28.89 kg/m²     Body mass index is 28.89 kg/m².    Physical Exam    General: Alert. No acute distress.   Right hip- tender to trochanteric bursa. Internal Rotation 10. Flexion 90. External Rotation " 30. 4/5 hip flexion. 5/5 hip Abduction. Pain with hip motion. Neurovascularly intact. Positive EHL, FHL, GS and TA. Sensation intact to all 5 nerves of the foot. Positive pulses. No knee effusion or joint line pain. Slight antalgic gait favoring the right hip.     Procedures    Imaging Results (Most Recent)     None                   Assessment and Plan    Diagnoses and all orders for this visit:    1. AVN (avascular necrosis of bone) ([facility]) (Primary)    2. Right hip pain    3. Osteoarthritis of right hip, unspecified osteoarthritis type        Discussed the treatment options with the patient, operative vs non-operative. I reviewed the x-rays that were previously obtained. Discussed the risks and benefits of a right hip replacement vs conservative treatment. Prescription for diclofenac given today. We will attempt non operative measures and monitor his response. May consider a hip injection in the future.     Call or return if symptoms worsen or patient has any concerns.       Scribed for Shantanu Mendez MD by Marilin Mohamud  02/21/2022   11:01 EST         Follow Up   Return in about 6 weeks (around 4/4/2022).  Patient was given instructions and counseling regarding his condition or for health maintenance advice. Please see specific information pulled into the AVS if appropriate.       I have personally performed the services described in this document as scribed by the above individual and it is both accurate and complete.     Shantanu Mendez MD  02/21/22  11:08 EST

## 2022-04-06 ENCOUNTER — OFFICE VISIT (OUTPATIENT)
Dept: ORTHOPEDIC SURGERY | Facility: CLINIC | Age: 42
End: 2022-04-06

## 2022-04-06 VITALS — WEIGHT: 215 LBS | BODY MASS INDEX: 27.59 KG/M2 | HEIGHT: 74 IN

## 2022-04-06 DIAGNOSIS — M16.11 OSTEOARTHRITIS OF RIGHT HIP, UNSPECIFIED OSTEOARTHRITIS TYPE: ICD-10-CM

## 2022-04-06 DIAGNOSIS — M87.00 AVN (AVASCULAR NECROSIS OF BONE): Primary | ICD-10-CM

## 2022-04-06 DIAGNOSIS — M25.551 RIGHT HIP PAIN: ICD-10-CM

## 2022-04-06 PROCEDURE — 99213 OFFICE O/P EST LOW 20 MIN: CPT | Performed by: PHYSICIAN ASSISTANT

## 2022-04-06 RX ORDER — DICLOFENAC SODIUM 75 MG/1
75 TABLET, DELAYED RELEASE ORAL 2 TIMES DAILY
Qty: 2 TABLET | Refills: 1 | Status: SHIPPED | OUTPATIENT
Start: 2022-04-06 | End: 2022-04-12 | Stop reason: SDUPTHER

## 2022-04-06 NOTE — PROGRESS NOTES
"Chief Complaint  Follow-up of the Right Hip    Subjective          Vasiliy Van presents to [facility] for   History of Present Illness    Vasiliy presents today for follow-up of his right hip.  Patient has right hip AVN that we have been treating nonoperatively.  Today, patient reports continued right hip pain.  He states that the diclofenac has provided minimal relief.  He states that his pain is worse the more active he is in the first thing in the morning he feels more of a pinching pain.  He states that his right hip pain is constant and interferes with daily activities.  He denies numbness or tingling.      Allergies   Allergen Reactions   • Bupropion Seizure        Social History     Socioeconomic History   • Marital status: Legally    Tobacco Use   • Smoking status: Current Every Day Smoker     Packs/day: 1.00     Years: 15.00     Pack years: 15.00     Types: Cigarettes   • Smokeless tobacco: Never Used   Vaping Use   • Vaping Use: Never used   Substance and Sexual Activity   • Alcohol use: Never   • Drug use: Never   • Sexual activity: Defer        I reviewed the patient's chief complaint, history of present illness, review of systems, past medical history, surgical history, family history, social history, medications, and allergy list.     REVIEW OF SYSTEMS    Constitutional: Denies fevers, chills, weight loss  Cardiovascular: Denies chest pain, shortness of breath  Skin: Denies rashes, acute skin changes  Neurologic: Denies headache, loss of consciousness  MSK: Right hip pain      Objective   Vital Signs:   Ht 188 cm (74\")   Wt 97.5 kg (215 lb)   BMI 27.60 kg/m²     Body mass index is 27.6 kg/m².    Physical Exam    General: Alert. No acute distress.  Gait: Slightly antalgic gait favoring right.  Right lower extremity: Tenderness to palpation of the trochanteric bursa.  No pain with passive hip range of motion.  Hip flexion intact.  Internal rotation 10 " degrees.  External rotation 30 degrees.  4-5 hip flexion.  5 out of 5 hip abduction.  Knee extensor mechanism intact.  Calf soft, nontender.  Full knee extension.  Flexion to 120 degrees.  Sensation intact over the dorsal and plantar foot.  Palpable pedal pulses.    Procedures    Imaging Results (Most Recent)     None                 Assessment and Plan    Diagnoses and all orders for this visit:    1. AVN (avascular necrosis of bone) (HCC) (Primary)  -     FL Guide For Pain Meds Injection Joint  -     MRI Hip Right Without Contrast    2. Osteoarthritis of right hip, unspecified osteoarthritis type  -     diclofenac (VOLTAREN) 75 MG EC tablet; Take 1 tablet by mouth 2 (Two) Times a Day.  Dispense: 2 tablet; Refill: 1    3. Right hip pain  -     diclofenac (VOLTAREN) 75 MG EC tablet; Take 1 tablet by mouth 2 (Two) Times a Day.  Dispense: 2 tablet; Refill: 1        Vasiliy presents today for follow-up of his right hip AVN that we are treating nonoperatively.  We again discussed continuing with nonoperative management versus operative management.  Patient's diclofenac prescription was increased to 75 mg twice a day.  We discussed the right hip injection.  Patient understood and elected to proceed.  Right hip injection was ordered today.  Right hip MRI was ordered today for further evaluation.  Patient will follow up after the MRI to review the results.  No new x-rays needed at next visit.    Call or return if symptoms worsen or patient has any concerns.       Follow Up   Return for After MRI.  Patient was given instructions and counseling regarding his condition or for health maintenance advice. Please see specific information pulled into the AVS if appropriate.     Rosa Munoz PA-C  04/06/22  08:43 EDT

## 2022-04-12 RX ORDER — DICLOFENAC SODIUM 75 MG/1
75 TABLET, DELAYED RELEASE ORAL 2 TIMES DAILY
Qty: 60 TABLET | Refills: 1 | Status: SHIPPED | OUTPATIENT
Start: 2022-04-12 | End: 2022-09-19

## 2022-05-02 ENCOUNTER — APPOINTMENT (OUTPATIENT)
Dept: INTERVENTIONAL RADIOLOGY/VASCULAR | Facility: HOSPITAL | Age: 42
End: 2022-05-02

## 2022-05-02 ENCOUNTER — APPOINTMENT (OUTPATIENT)
Dept: MRI IMAGING | Facility: HOSPITAL | Age: 42
End: 2022-05-02

## 2022-05-09 ENCOUNTER — HOSPITAL ENCOUNTER (OUTPATIENT)
Dept: MRI IMAGING | Facility: HOSPITAL | Age: 42
Discharge: HOME | End: 2022-05-09

## 2022-05-09 ENCOUNTER — HOSPITAL ENCOUNTER (OUTPATIENT)
Dept: INTERVENTIONAL RADIOLOGY/VASCULAR | Facility: HOSPITAL | Age: 42
Discharge: HOME | End: 2022-05-09

## 2022-05-09 PROCEDURE — 73721 MRI JNT OF LWR EXTRE W/O DYE: CPT

## 2022-05-09 PROCEDURE — 25010000002 METHYLPREDNISOLONE PER 80 MG

## 2022-05-09 PROCEDURE — 77002 NEEDLE LOCALIZATION BY XRAY: CPT

## 2022-05-09 PROCEDURE — 25010000002 IOPAMIDOL 61 % SOLUTION: Performed by: PHYSICIAN ASSISTANT

## 2022-05-09 RX ORDER — LIDOCAINE HYDROCHLORIDE 20 MG/ML
INJECTION, SOLUTION INFILTRATION; PERINEURAL
Status: COMPLETED
Start: 2022-05-09 | End: 2022-05-09

## 2022-05-09 RX ORDER — BUPIVACAINE HYDROCHLORIDE 5 MG/ML
INJECTION, SOLUTION EPIDURAL; INTRACAUDAL
Status: COMPLETED
Start: 2022-05-09 | End: 2022-05-09

## 2022-05-09 RX ORDER — METHYLPREDNISOLONE ACETATE 80 MG/ML
INJECTION, SUSPENSION INTRA-ARTICULAR; INTRALESIONAL; INTRAMUSCULAR; SOFT TISSUE
Status: DISCONTINUED
Start: 2022-05-09 | End: 2022-05-09 | Stop reason: WASHOUT

## 2022-05-09 RX ORDER — METHYLPREDNISOLONE ACETATE 80 MG/ML
INJECTION, SUSPENSION INTRA-ARTICULAR; INTRALESIONAL; INTRAMUSCULAR; SOFT TISSUE
Status: COMPLETED
Start: 2022-05-09 | End: 2022-05-09

## 2022-05-09 RX ADMIN — BUPIVACAINE HYDROCHLORIDE 4 ML: 5 INJECTION, SOLUTION EPIDURAL; INTRACAUDAL at 10:49

## 2022-05-09 RX ADMIN — METHYLPREDNISOLONE ACETATE 1 MG: 80 INJECTION, SUSPENSION INTRA-ARTICULAR; INTRALESIONAL; INTRAMUSCULAR; SOFT TISSUE at 10:49

## 2022-05-09 RX ADMIN — LIDOCAINE HYDROCHLORIDE 10 ML: 20 INJECTION, SOLUTION INFILTRATION; PERINEURAL at 10:46

## 2022-05-09 RX ADMIN — IOPAMIDOL 15 ML: 612 INJECTION, SOLUTION INTRATHECAL at 10:48

## 2022-09-16 ENCOUNTER — OFFICE VISIT (OUTPATIENT)
Dept: ORTHOPEDIC SURGERY | Facility: CLINIC | Age: 42
End: 2022-09-16

## 2022-09-16 ENCOUNTER — TELEPHONE (OUTPATIENT)
Dept: ORTHOPEDIC SURGERY | Facility: CLINIC | Age: 42
End: 2022-09-16

## 2022-09-16 ENCOUNTER — PATIENT MESSAGE (OUTPATIENT)
Dept: ORTHOPEDIC SURGERY | Facility: CLINIC | Age: 42
End: 2022-09-16

## 2022-09-16 VITALS — OXYGEN SATURATION: 99 % | BODY MASS INDEX: 27.08 KG/M2 | HEART RATE: 93 BPM | WEIGHT: 211 LBS | HEIGHT: 74 IN

## 2022-09-16 DIAGNOSIS — M16.11 OSTEOARTHRITIS OF RIGHT HIP, UNSPECIFIED OSTEOARTHRITIS TYPE: ICD-10-CM

## 2022-09-16 DIAGNOSIS — M25.551 RIGHT HIP PAIN: Primary | ICD-10-CM

## 2022-09-16 DIAGNOSIS — M87.00 AVN (AVASCULAR NECROSIS OF BONE): ICD-10-CM

## 2022-09-16 DIAGNOSIS — M16.11 OSTEOARTHRITIS OF RIGHT HIP, UNSPECIFIED OSTEOARTHRITIS TYPE: Primary | ICD-10-CM

## 2022-09-16 PROCEDURE — 99214 OFFICE O/P EST MOD 30 MIN: CPT | Performed by: STUDENT IN AN ORGANIZED HEALTH CARE EDUCATION/TRAINING PROGRAM

## 2022-09-16 NOTE — PROGRESS NOTES
"Chief Complaint  Follow-up and Pain of the Right Hip    Subjective          Vasiliy Van presents to [facility] for   History of Present Illness    Bobby returns today for follow-up of his right hip.  He has right hip avascular necrosis.  He has been treating this nonoperatively.  He reports worsening pain over the last several months.  He is unhappy with the results of nonoperative management and interested in right total hip arthroplasty.  He had a previous left total hip arthroplasty for avascular necrosis and did very well with that.    Allergies   Allergen Reactions   • Bupropion Seizure        Social History     Socioeconomic History   • Marital status: Legally    Tobacco Use   • Smoking status: Current Every Day Smoker     Packs/day: 1.00     Years: 15.00     Pack years: 15.00     Types: Cigarettes   • Smokeless tobacco: Never Used   Vaping Use   • Vaping Use: Never used   Substance and Sexual Activity   • Alcohol use: Never   • Drug use: Never   • Sexual activity: Defer        I reviewed the patient's chief complaint, history of present illness, review of systems, past medical history, surgical history, family history, social history, medications, and allergy list.     REVIEW OF SYSTEMS    Constitutional: Denies fevers, chills, weight loss  Cardiovascular: Denies chest pain, shortness of breath  Skin: Denies rashes, acute skin changes  Neurologic: Denies headache, loss of consciousness  MSK: Right hip pain      Objective   Vital Signs:   Pulse 93   Ht 188 cm (74\")   Wt 95.7 kg (211 lb)   SpO2 99%   BMI 27.09 kg/m²     Body mass index is 27.09 kg/m².    Physical Exam    General: Alert. No acute distress.   Right lower extremity: Right hip- tender to trochanteric bursa. Internal Rotation 10. Flexion 90. External Rotation 30. 4/5 hip flexion. 5/5 hip Abduction. Pain with hip motion. Neurovascularly intact. Positive EHL, FHL, GS and TA. Sensation intact to all 5 " nerves of the foot. Positive pulses. No knee effusion or joint line pain. Slight antalgic gait favoring the right hip.     Procedures    Imaging Results (Most Recent)     Procedure Component Value Units Date/Time    XR Hip With or Without Pelvis 2 - 3 View Right [[id number]] Resulted: 09/16/22 1024     Updated: 09/16/22 1026    Narrative:      Indications: Right hip pain, follow-up avascular necrosis    Views: AP pelvis, AP and frog lateral right hip    Findings: Cystic and sclerotic changes in the right femoral head   consistent with avascular necrosis.  There appears to be early subchondral   collapse along the weightbearing portion.  Hip reduced.  No fracture seen.    Pelvic ring intact.  Press-fit left total hip arthroplasty implants in   place, partially visualized.    Comparative Data: Comparative data found and reviewed today                     Assessment and Plan        XR Hip With or Without Pelvis 2 - 3 View Right    Result Date: 9/16/2022  Narrative: Indications: Right hip pain, follow-up avascular necrosis Views: AP pelvis, AP and frog lateral right hip Findings: Cystic and sclerotic changes in the right femoral head consistent with avascular necrosis.  There appears to be early subchondral collapse along the weightbearing portion.  Hip reduced.  No fracture seen.  Pelvic ring intact.  Press-fit left total hip arthroplasty implants in place, partially visualized. Comparative Data: Comparative data found and reviewed today        Diagnoses and all orders for this visit:    1. Right hip pain (Primary)  -     XR Hip With or Without Pelvis 2 - 3 View Right    2. AVN (avascular necrosis of bone) ([facility])    3. Osteoarthritis of right hip, unspecified osteoarthritis type        Right has right hip avascular necrosis now with early subchondral collapse.  We discussed treatment options.  We discussed postoperative and nonoperative management.  We specifically discussed right total hip arthroplasty.  We discussed  the anterior approach.  We discussed the primary benefits of surgery including improved pain and improving function.  We discussed surgical risk including bleeding, infection, damage nerves and blood vessels, hardware complications, fracture, loosening, instability, leg length discrepancy, persistent pain, anesthesia risk, DVT/PE, and need for additional procedures.  He elected to proceed with surgical management.        Call or return if worsening symptoms.    Scribed for Shantanu Mendez MD by Carmen Schmitt  09/16/2022   09:17 EDT         Follow Up   Return for 2-week postoperative visit.  Patient was given instructions and counseling regarding his condition or for health maintenance advice. Please see specific information pulled into the AVS if appropriate.       I have personally performed the services described in this document as scribed by the above individual and it is both accurate and complete.     Shantanu Mendez MD  09/16/22  12:07 EDT

## 2022-09-19 RX ORDER — NAPROXEN 500 MG/1
500 TABLET ORAL 2 TIMES DAILY
Qty: 60 TABLET | Refills: 0 | Status: SHIPPED | OUTPATIENT
Start: 2022-09-19 | End: 2022-09-28 | Stop reason: ALTCHOICE

## 2022-09-19 NOTE — TELEPHONE ENCOUNTER
From: Vasiliy Van  To: Shantanu Mendez MD  Sent: 9/16/2022 12:31 PM EDT  Subject: Pain Management     This morning at my appointment I guess my mind went blank after talking about surgery but what can I take for pain until I have surgery. I've been taking the Voltaren as prescribed with little to no help at all. Thanks

## 2022-09-21 ENCOUNTER — PREP FOR SURGERY (OUTPATIENT)
Dept: OTHER | Facility: HOSPITAL | Age: 42
End: 2022-09-21

## 2022-09-21 DIAGNOSIS — M16.11 OSTEOARTHRITIS OF RIGHT HIP, UNSPECIFIED OSTEOARTHRITIS TYPE: Primary | ICD-10-CM

## 2022-09-21 DIAGNOSIS — M87.00 AVN (AVASCULAR NECROSIS OF BONE): ICD-10-CM

## 2022-09-21 RX ORDER — CEFAZOLIN SODIUM IN 0.9 % NACL 3 G/100 ML
3 INTRAVENOUS SOLUTION, PIGGYBACK (ML) INTRAVENOUS ONCE
Status: CANCELLED | OUTPATIENT
Start: 2022-09-21 | End: 2022-09-21

## 2022-09-21 RX ORDER — CEFAZOLIN SODIUM 2 G/100ML
2 INJECTION, SOLUTION INTRAVENOUS ONCE
Status: CANCELLED | OUTPATIENT
Start: 2022-09-21 | End: 2022-09-21

## 2022-09-21 RX ORDER — POVIDONE-IODINE 10 MG/ML
SOLUTION TOPICAL ONCE
Status: CANCELLED | OUTPATIENT
Start: 2022-09-21 | End: 2022-09-21

## 2022-09-21 RX ORDER — TRANEXAMIC ACID 10 MG/ML
1000 INJECTION, SOLUTION INTRAVENOUS ONCE
Status: CANCELLED | OUTPATIENT
Start: 2022-09-21 | End: 2022-09-21

## 2022-09-27 ENCOUNTER — TELEPHONE (OUTPATIENT)
Dept: ORTHOPEDIC SURGERY | Facility: CLINIC | Age: 42
End: 2022-09-27

## 2022-09-27 DIAGNOSIS — M16.11 OSTEOARTHRITIS OF RIGHT HIP, UNSPECIFIED OSTEOARTHRITIS TYPE: Primary | ICD-10-CM

## 2022-09-27 NOTE — TELEPHONE ENCOUNTER
PATIENT REQUESTING SOMETHING FOR PAIN.  PATIENT STATES THAT VOLTAREN IS NOT HELPING.  [facility]

## 2022-09-28 RX ORDER — MELOXICAM 15 MG/1
15 TABLET ORAL DAILY
Qty: 30 TABLET | Refills: 0 | Status: ON HOLD | OUTPATIENT
Start: 2022-09-28 | End: 2022-10-20

## 2022-09-28 NOTE — TELEPHONE ENCOUNTER
PER DR PITTS PATIENT ADVISED WE CAN SEND A SCRIPT FOR MOBIC, IBUPROFEN 800 MG OR NAPROXEN TO PHARMACY OR HE MAY TRY OTC TYLENOL. PATIENT STATED HE HAS TRIED EVERYTHING BUT THE MOBIC SO HE WOULD LIKE TO TRY MOBIC.. SCRIPT FOR MOBIC SENT TO PHARMACY.

## 2022-10-11 ENCOUNTER — PRE-ADMISSION TESTING (OUTPATIENT)
Dept: PREADMISSION TESTING | Facility: HOSPITAL | Age: 42
End: 2022-10-11

## 2022-10-11 VITALS
DIASTOLIC BLOOD PRESSURE: 74 MMHG | WEIGHT: 220.68 LBS | SYSTOLIC BLOOD PRESSURE: 142 MMHG | RESPIRATION RATE: 16 BRPM | HEART RATE: 104 BPM | TEMPERATURE: 97.5 F | HEIGHT: 74 IN | BODY MASS INDEX: 28.32 KG/M2 | OXYGEN SATURATION: 98 %

## 2022-10-11 DIAGNOSIS — M16.11 OSTEOARTHRITIS OF RIGHT HIP, UNSPECIFIED OSTEOARTHRITIS TYPE: ICD-10-CM

## 2022-10-11 DIAGNOSIS — M87.00 AVN (AVASCULAR NECROSIS OF BONE): ICD-10-CM

## 2022-10-11 DIAGNOSIS — Z96.641 AFTERCARE FOLLOWING RIGHT HIP JOINT REPLACEMENT SURGERY: Primary | ICD-10-CM

## 2022-10-11 DIAGNOSIS — Z47.1 AFTERCARE FOLLOWING RIGHT HIP JOINT REPLACEMENT SURGERY: Primary | ICD-10-CM

## 2022-10-11 LAB
ALBUMIN SERPL-MCNC: 4.4 G/DL (ref 3.5–5.2)
ALBUMIN/GLOB SERPL: 1.6 G/DL
ALP SERPL-CCNC: 116 U/L (ref 39–117)
ALT SERPL W P-5'-P-CCNC: 27 U/L (ref 1–41)
ANION GAP SERPL CALCULATED.3IONS-SCNC: 7.6 MMOL/L (ref 5–15)
AST SERPL-CCNC: 16 U/L (ref 1–40)
BASOPHILS # BLD AUTO: 0.07 10*3/MM3 (ref 0–0.2)
BASOPHILS NFR BLD AUTO: 0.9 % (ref 0–1.5)
BILIRUB SERPL-MCNC: 0.2 MG/DL (ref 0–1.2)
BUN SERPL-MCNC: 10 MG/DL (ref 6–20)
BUN/CREAT SERPL: 9.7 (ref 7–25)
CALCIUM SPEC-SCNC: 9.3 MG/DL (ref 8.6–10.5)
CHLORIDE SERPL-SCNC: 102 MMOL/L (ref 98–107)
CO2 SERPL-SCNC: 31.4 MMOL/L (ref 22–29)
CREAT SERPL-MCNC: 1.03 MG/DL (ref 0.76–1.27)
DEPRECATED RDW RBC AUTO: 44.4 FL (ref 37–54)
EGFRCR SERPLBLD CKD-EPI 2021: 93 ML/MIN/1.73
EOSINOPHIL # BLD AUTO: 0.29 10*3/MM3 (ref 0–0.4)
EOSINOPHIL NFR BLD AUTO: 3.5 % (ref 0.3–6.2)
ERYTHROCYTE [DISTWIDTH] IN BLOOD BY AUTOMATED COUNT: 12.8 % (ref 12.3–15.4)
GLOBULIN UR ELPH-MCNC: 2.7 GM/DL
GLUCOSE SERPL-MCNC: 119 MG/DL (ref 65–99)
HBA1C MFR BLD: 5.8 % (ref 4.8–5.6)
HCT VFR BLD AUTO: 41.1 % (ref 37.5–51)
HGB BLD-MCNC: 13.8 G/DL (ref 13–17.7)
IMM GRANULOCYTES # BLD AUTO: 0.03 10*3/MM3 (ref 0–0.05)
IMM GRANULOCYTES NFR BLD AUTO: 0.4 % (ref 0–0.5)
INR PPP: 0.99 (ref 0.86–1.15)
LYMPHOCYTES # BLD AUTO: 2.84 10*3/MM3 (ref 0.7–3.1)
LYMPHOCYTES NFR BLD AUTO: 34.8 % (ref 19.6–45.3)
MCH RBC QN AUTO: 31.9 PG (ref 26.6–33)
MCHC RBC AUTO-ENTMCNC: 33.6 G/DL (ref 31.5–35.7)
MCV RBC AUTO: 95.1 FL (ref 79–97)
MONOCYTES # BLD AUTO: 0.48 10*3/MM3 (ref 0.1–0.9)
MONOCYTES NFR BLD AUTO: 5.9 % (ref 5–12)
NEUTROPHILS NFR BLD AUTO: 4.46 10*3/MM3 (ref 1.7–7)
NEUTROPHILS NFR BLD AUTO: 54.5 % (ref 42.7–76)
NRBC BLD AUTO-RTO: 0 /100 WBC (ref 0–0.2)
PLATELET # BLD AUTO: 210 10*3/MM3 (ref 140–450)
PMV BLD AUTO: 9.6 FL (ref 6–12)
POTASSIUM SERPL-SCNC: 4.2 MMOL/L (ref 3.5–5.2)
PROT SERPL-MCNC: 7.1 G/DL (ref 6–8.5)
PROTHROMBIN TIME: 13.2 SECONDS (ref 11.8–14.9)
QT INTERVAL: 354 MS
RBC # BLD AUTO: 4.32 10*6/MM3 (ref 4.14–5.8)
SODIUM SERPL-SCNC: 141 MMOL/L (ref 136–145)
WBC NRBC COR # BLD: 8.17 10*3/MM3 (ref 3.4–10.8)

## 2022-10-11 PROCEDURE — 85025 COMPLETE CBC W/AUTO DIFF WBC: CPT

## 2022-10-11 PROCEDURE — 80053 COMPREHEN METABOLIC PANEL: CPT

## 2022-10-11 PROCEDURE — 93005 ELECTROCARDIOGRAM TRACING: CPT

## 2022-10-11 PROCEDURE — 85610 PROTHROMBIN TIME: CPT

## 2022-10-11 PROCEDURE — 83036 HEMOGLOBIN GLYCOSYLATED A1C: CPT

## 2022-10-11 PROCEDURE — 93010 ELECTROCARDIOGRAM REPORT: CPT | Performed by: INTERNAL MEDICINE

## 2022-10-11 PROCEDURE — 36415 COLL VENOUS BLD VENIPUNCTURE: CPT

## 2022-10-11 NOTE — DISCHARGE INSTRUCTIONS
IMPORTANT INSTRUCTIONS - PRE-ADMISSION TESTING  DO NOT EAT OR CHEW anything after midnight the night before your procedure.    You may have CLEAR liquids up to _3_____ hours prior to ARRIVAL time.   Take the following medications the morning of your procedure with JUST A SIP OF WATER:  _______  ABILIFY, ATORVASTATIN, FETZIMA________________________________________________________________________________________________________________________________________________________________________________    DO NOT BRING your medications to the hospital with you, UNLESS something has changed since your PRE-Admission Testing appointment.  AFTER 10/12/22 Hold all vitamins, supplements, and NSAIDS INCLUDING MELOXICAM (Non- steroidal anti-inflammatory meds) for one week prior to surgery (you MAY take Tylenol or Acetaminophen).  If you are diabetic, check your blood sugar the morning of your procedure. If it is less than 70 or if you are feeling symptomatic, call the following number for further instructions: [phone]  Use your inhalers/nebulizers as usual, the morning of your procedure. BRING YOUR INHALERS with you.   Bring your CPAP or BIPAP to hospital, ONLY IF YOU WILL BE SPENDING THE NIGHT.   Make sure you have a ride home and have someone who will stay with you the day of your procedure after you go home.  If you have any questions, please call your Pre-Admission Testing Nurse, ___BRANDY_____________ at [phone]___________.   Per anesthesia request, do not smoke for 24 hours before your procedure or as instructed by your surgeon.   WILL CALL ON 10/19/22 AND GIVE OFFICIAL ARRIVAL TIME FOR DAY OF SURGERY  DRINK 20 OZ GATORADE NO RED 3 HOURS PRIOR TO ARRIVAL. REFER TO CLEAR LIQUID DIET SHEET FOR APPROVED CLEAR LIQUIDS  COME TO SAME AREA HAD PAT APPT ELEVATOR A 3RD FLOOR  CASH, CHECK, OR CARD FOR MED TO BED IF INDICATED  NO SMOKING 24 HOURS PRIOR TO PROCEDURE  REFER TO PAGE 9 IN TOTAL JOINT BOOK FOR BATHING  INSTRUCTIONS. NO JEWELRY OF ANY TYPE DAY OF PROCEDURE

## 2022-10-12 RX ORDER — TRANEXAMIC ACID 10 MG/ML
2000 INJECTION, SOLUTION INTRAVENOUS ONCE
Status: CANCELLED | OUTPATIENT
Start: 2022-10-12 | End: 2022-10-12

## 2022-10-13 ENCOUNTER — ANESTHESIA EVENT (OUTPATIENT)
Dept: PERIOP | Facility: HOSPITAL | Age: 42
End: 2022-10-13

## 2022-10-15 ENCOUNTER — APPOINTMENT (OUTPATIENT)
Dept: GENERAL RADIOLOGY | Facility: HOSPITAL | Age: 42
End: 2022-10-15

## 2022-10-15 ENCOUNTER — HOSPITAL ENCOUNTER (EMERGENCY)
Facility: HOSPITAL | Age: 42
Discharge: HOME | End: 2022-10-15
Attending: EMERGENCY MEDICINE | Admitting: EMERGENCY MEDICINE

## 2022-10-15 VITALS
HEART RATE: 102 BPM | HEIGHT: 72 IN | RESPIRATION RATE: 22 BRPM | BODY MASS INDEX: 27.77 KG/M2 | WEIGHT: 205.03 LBS | SYSTOLIC BLOOD PRESSURE: 119 MMHG | OXYGEN SATURATION: 96 % | TEMPERATURE: 98.2 F | DIASTOLIC BLOOD PRESSURE: 79 MMHG
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
## 2022-10-15 DIAGNOSIS — M87.051 AVASCULAR NECROSIS OF BONE OF RIGHT HIP: ICD-10-CM

## 2022-10-15 LAB
ALBUMIN SERPL-MCNC: 4.9 G/DL (ref 3.5–5.2)
ALBUMIN/GLOB SERPL: 1.4 G/DL
ALP SERPL-CCNC: 129 U/L (ref 39–117)
ALT SERPL W P-5'-P-CCNC: 29 U/L (ref 1–41)
ANION GAP SERPL CALCULATED.3IONS-SCNC: 12.9 MMOL/L (ref 5–15)
AST SERPL-CCNC: 22 U/L (ref 1–40)
BASOPHILS # BLD AUTO: 0.07 10*3/MM3 (ref 0–0.2)
BASOPHILS NFR BLD AUTO: 0.6 % (ref 0–1.5)
BILIRUB SERPL-MCNC: 0.7 MG/DL (ref 0–1.2)
BUN SERPL-MCNC: 12 MG/DL (ref 6–20)
BUN/CREAT SERPL: 11.1 (ref 7–25)
CALCIUM SPEC-SCNC: 10.1 MG/DL (ref 8.6–10.5)
CHLORIDE SERPL-SCNC: 99 MMOL/L (ref 98–107)
CO2 SERPL-SCNC: 25.1 MMOL/L (ref 22–29)
CREAT SERPL-MCNC: 1.08 MG/DL (ref 0.76–1.27)
DEPRECATED RDW RBC AUTO: 41.5 FL (ref 37–54)
EGFRCR SERPLBLD CKD-EPI 2021: 87.9 ML/MIN/1.73
EOSINOPHIL # BLD AUTO: 0.06 10*3/MM3 (ref 0–0.4)
EOSINOPHIL NFR BLD AUTO: 0.5 % (ref 0.3–6.2)
ERYTHROCYTE [DISTWIDTH] IN BLOOD BY AUTOMATED COUNT: 12.4 % (ref 12.3–15.4)
GLOBULIN UR ELPH-MCNC: 3.4 GM/DL
GLUCOSE SERPL-MCNC: 106 MG/DL (ref 65–99)
HCT VFR BLD AUTO: 45.1 % (ref 37.5–51)
HGB BLD-MCNC: 16 G/DL (ref 13–17.7)
HOLD SPECIMEN: NORMAL
HOLD SPECIMEN: NORMAL
IMM GRANULOCYTES # BLD AUTO: 0.04 10*3/MM3 (ref 0–0.05)
IMM GRANULOCYTES NFR BLD AUTO: 0.3 % (ref 0–0.5)
LYMPHOCYTES # BLD AUTO: 3.3 10*3/MM3 (ref 0.7–3.1)
LYMPHOCYTES NFR BLD AUTO: 27.8 % (ref 19.6–45.3)
MCH RBC QN AUTO: 32.5 PG (ref 26.6–33)
MCHC RBC AUTO-ENTMCNC: 35.5 G/DL (ref 31.5–35.7)
MCV RBC AUTO: 91.7 FL (ref 79–97)
MONOCYTES # BLD AUTO: 0.71 10*3/MM3 (ref 0.1–0.9)
MONOCYTES NFR BLD AUTO: 6 % (ref 5–12)
NEUTROPHILS NFR BLD AUTO: 64.8 % (ref 42.7–76)
NEUTROPHILS NFR BLD AUTO: 7.67 10*3/MM3 (ref 1.7–7)
NRBC BLD AUTO-RTO: 0 /100 WBC (ref 0–0.2)
PLATELET # BLD AUTO: 280 10*3/MM3 (ref 140–450)
PMV BLD AUTO: 9.8 FL (ref 6–12)
POTASSIUM SERPL-SCNC: 4.6 MMOL/L (ref 3.5–5.2)
PROT SERPL-MCNC: 8.3 G/DL (ref 6–8.5)
RBC # BLD AUTO: 4.92 10*6/MM3 (ref 4.14–5.8)
SODIUM SERPL-SCNC: 137 MMOL/L (ref 136–145)
WBC NRBC COR # BLD: 11.85 10*3/MM3 (ref 3.4–10.8)
WHOLE BLOOD HOLD COAG: NORMAL
WHOLE BLOOD HOLD SPECIMEN: NORMAL

## 2022-10-15 PROCEDURE — 96375 TX/PRO/DX INJ NEW DRUG ADDON: CPT

## 2022-10-15 PROCEDURE — 80053 COMPREHEN METABOLIC PANEL: CPT

## 2022-10-15 PROCEDURE — 25010000002 KETOROLAC TROMETHAMINE PER 15 MG: Performed by: EMERGENCY MEDICINE

## 2022-10-15 PROCEDURE — 85025 COMPLETE CBC W/AUTO DIFF WBC: CPT

## 2022-10-15 PROCEDURE — 99284 EMERGENCY DEPT VISIT MOD MDM: CPT

## 2022-10-15 PROCEDURE — 25010000002 MORPHINE PER 10 MG: Performed by: EMERGENCY MEDICINE

## 2022-10-15 PROCEDURE — 25010000002 ONDANSETRON PER 1 MG: Performed by: EMERGENCY MEDICINE

## 2022-10-15 PROCEDURE — 73502 X-RAY EXAM HIP UNI 2-3 VIEWS: CPT

## 2022-10-15 PROCEDURE — 96374 THER/PROPH/DIAG INJ IV PUSH: CPT

## 2022-10-15 RX ORDER — KETOROLAC TROMETHAMINE 30 MG/ML
30 INJECTION, SOLUTION INTRAMUSCULAR; INTRAVENOUS ONCE
Status: COMPLETED | OUTPATIENT
Start: 2022-10-15 | End: 2022-10-15

## 2022-10-15 RX ORDER — KETOROLAC TROMETHAMINE 10 MG/1
10 TABLET, FILM COATED ORAL EVERY 6 HOURS PRN
Qty: 20 TABLET | Refills: 0 | Status: ON HOLD | OUTPATIENT
Start: 2022-10-15 | End: 2022-10-20

## 2022-10-15 RX ORDER — SODIUM CHLORIDE 0.9 % (FLUSH) 0.9 %
10 SYRINGE (ML) INJECTION AS NEEDED
Status: DISCONTINUED | OUTPATIENT
Start: 2022-10-15 | End: 2022-10-15 | Stop reason: HOSPADM

## 2022-10-15 RX ORDER — ONDANSETRON 2 MG/ML
4 INJECTION INTRAMUSCULAR; INTRAVENOUS ONCE
Status: COMPLETED | OUTPATIENT
Start: 2022-10-15 | End: 2022-10-15

## 2022-10-15 RX ADMIN — MORPHINE SULFATE 4 MG: 4 INJECTION, SOLUTION INTRAMUSCULAR; INTRAVENOUS at 15:18

## 2022-10-15 RX ADMIN — ONDANSETRON 4 MG: 2 INJECTION INTRAMUSCULAR; INTRAVENOUS at 15:18

## 2022-10-15 RX ADMIN — KETOROLAC TROMETHAMINE 30 MG: 30 INJECTION, SOLUTION INTRAMUSCULAR; INTRAVENOUS at 14:34

## 2022-10-15 NOTE — ED PROVIDER NOTES
Time: 2:12 PM EDT  Arrived by: private car  Chief Complaint: Fall, right hip pain  History provided by: Patient  History is limited by: N/A     History of Present Illness:  Patient is a 42 y.o. year old male who presents to the emergency department with complaint of right hip pain after a fall.  He says his right leg just gave out.  He has a history of avascular necrosis which his provider believes may be related to his use of testosterone injections.  He has had his left hip replaced and is supposed to have his right hip replaced with Dr. Shantanu Mendez.  He has not taken any pain medicines.  Patient denies feeling dizzy.      Patient Care Team  Primary Care Provider: Lynn Siddiqi APRN    Past Medical History:     Allergies   Allergen Reactions   • Bupropion Seizure     Past Medical History:   Diagnosis Date   • Arthritis    • Avascular necrosis (HCC)     RIGHT HIP   • GERD (gastroesophageal reflux disease)    • High blood pressure    • High cholesterol    • Insomnia    • Seizure (HCC)     X1 FELT D/T BUPROPION DENIES ANY FURTHER ISSUES   • Sleep apnea     NO CPAP   • Spinal headache      Past Surgical History:   Procedure Laterality Date   • ENDOSCOPY N/A 06/22/2021    Procedure: ESOPHAGOGASTRODUODENOSCOPY with biopsy;  Surgeon: Satish Mejia MD;  Location: [facility] ENDOSCOPY;  Service: General;  Laterality: N/A;  SLIDING HIATAL HERNIA   • HIP SURGERY Left 01/2020    Total hip replacement   • JOINT REPLACEMENT      TOTAL HIP LEFT SIDE   • LUMBAR LAMINECTOMY  2001     Family History   Problem Relation Age of Onset   • Diabetes Mother    • Heart disease Father        Home Medications:  Prior to Admission medications    Medication Sig Start Date End Date Taking? Authorizing Provider   ARIPiprazole (ABILIFY) 2 MG tablet Take 2 mg by mouth Daily.    ProviderJoe MD   atorvastatin (LIPITOR) 20 MG tablet Take 20 mg by mouth Daily.    ProviderJoe MD   Levomilnacipran HCl ER (Fetzima) 120 MG capsule  "sustained-release 24 hr Take 120 mg by mouth Daily.    Provider, MD Joe   lisinopril (PRINIVIL,ZESTRIL) 30 MG tablet Take 30 mg by mouth Daily.    ProviderJoe MD   meloxicam (Mobic) 15 MG tablet Take 1 tablet by mouth Daily. 9/28/22   Shantanu Mendez MD   traZODone (DESYREL) 100 MG tablet Take 100 mg by mouth Every Night.    Provider, MD Joe        Social History:   Social History     Tobacco Use   • Smoking status: Every Day     Packs/day: 1.00     Years: 15.00     Pack years: 15.00     Types: Cigarettes   • Smokeless tobacco: Never   Vaping Use   • Vaping Use: Never used   Substance Use Topics   • Alcohol use: Never   • Drug use: Never     Recent travel: no     Review of Systems:  Review of Systems   Constitutional: Negative for chills and fever.   HENT: Negative for congestion, ear pain, rhinorrhea and sore throat.    Eyes: Negative for pain.   Respiratory: Negative for cough and shortness of breath.    Cardiovascular: Negative for chest pain.   Gastrointestinal: Negative for abdominal pain, diarrhea, nausea and vomiting.   Genitourinary: Negative for decreased urine volume, dysuria and flank pain.   Musculoskeletal: Positive for arthralgias (Right hip) and gait problem. Negative for myalgias.   Skin: Negative for rash.   Neurological: Negative for seizures and headaches.   All other systems reviewed and are negative.       Physical Exam:  /79   Pulse 102   Temp 98.2 °F (36.8 °C) (Oral)   Resp 22   Ht 182.9 cm (72\")   Wt 93 kg (205 lb 0.4 oz)   SpO2 96%   BMI 27.81 kg/m²     Physical Exam  Vitals and nursing note reviewed.   Constitutional:       General: He is not in acute distress.     Appearance: Normal appearance. He is normal weight. He is not ill-appearing, toxic-appearing or diaphoretic.   HENT:      Head: Normocephalic and atraumatic.      Right Ear: External ear normal.      Left Ear: External ear normal.   Eyes:      General: No scleral icterus.     " Conjunctiva/sclera: Conjunctivae normal.      Pupils: Pupils are equal, round, and reactive to light.   Cardiovascular:      Rate and Rhythm: Normal rate.   Pulmonary:      Effort: Pulmonary effort is normal. No respiratory distress.   Abdominal:      General: There is no distension.      Tenderness: There is no abdominal tenderness.   Musculoskeletal:         General: Tenderness (Right hip) present. No swelling, deformity or signs of injury. Normal range of motion.      Cervical back: Normal range of motion and neck supple.   Skin:     General: Skin is warm and dry.      Capillary Refill: Capillary refill takes less than 2 seconds.   Neurological:      General: No focal deficit present.      Mental Status: He is alert and oriented to person, place, and time.   Psychiatric:         Mood and Affect: Mood normal.         Behavior: Behavior normal.                Medications in the Emergency Department:  Medications   ketorolac (TORADOL) injection 30 mg (30 mg Intravenous Given 10/15/22 1434)   ondansetron (ZOFRAN) injection 4 mg (4 mg Intravenous Given 10/15/22 1518)   morphine injection 4 mg (4 mg Intravenous Given 10/15/22 1518)        Labs  Lab Results (last 24 hours)     ** No results found for the last 24 hours. **           Imaging:  No Radiology Exams Resulted Within Past 24 Hours    Procedures:  Procedures    Progress                            Medical Decision Making:  MDM  Number of Diagnoses or Management Options  Avascular necrosis of bone of right hip (HCC): new and requires workup  Fall, initial encounter: new and requires workup     Amount and/or Complexity of Data Reviewed  Clinical lab tests: reviewed  Tests in the radiology section of CPT®: reviewed and ordered  Independent visualization of images, tracings, or specimens: yes    Risk of Complications, Morbidity, and/or Mortality  Presenting problems: moderate  Diagnostic procedures: moderate  Management options: moderate         Final diagnoses:    Fall, initial encounter   Avascular necrosis of bone of right hip (HCC)        Disposition:  ED Disposition     ED Disposition   Eloped    Condition   --    Comment   Anika Oropeza entered the patient room to review discharge and patient was not in the room. Anika notified this RN. This RN also went to check in on patient and they were not in the room.              This medical record created using voice recognition software.           Anika Oropeza, APRN  10/19/22 [id number]

## 2022-10-15 NOTE — ED NOTES
This RN went to go check in on patient and patient seems to have left the building. Patient was last seen with an IV in place in the right AC. [facility] dispatch and [place] police department was called to go to the patient residence and confirm that the IV was removed. The police department is going to call back once they make contact with the patient.

## 2022-10-20 ENCOUNTER — APPOINTMENT (OUTPATIENT)
Dept: GENERAL RADIOLOGY | Facility: HOSPITAL | Age: 42
End: 2022-10-20

## 2022-10-20 ENCOUNTER — ANESTHESIA (OUTPATIENT)
Dept: PERIOP | Facility: HOSPITAL | Age: 42
End: 2022-10-20

## 2022-10-20 ENCOUNTER — HOSPITAL ENCOUNTER (OUTPATIENT)
Facility: HOSPITAL | Age: 42
Discharge: HOME HEALTH | End: 2022-10-21
Attending: STUDENT IN AN ORGANIZED HEALTH CARE EDUCATION/TRAINING PROGRAM | Admitting: STUDENT IN AN ORGANIZED HEALTH CARE EDUCATION/TRAINING PROGRAM

## 2022-10-20 DIAGNOSIS — Z78.9 DECREASED ACTIVITIES OF DAILY LIVING (ADL): ICD-10-CM

## 2022-10-20 DIAGNOSIS — R26.2 DIFFICULTY IN WALKING: Primary | ICD-10-CM

## 2022-10-20 DIAGNOSIS — M16.11 OSTEOARTHRITIS OF RIGHT HIP, UNSPECIFIED OSTEOARTHRITIS TYPE: ICD-10-CM

## 2022-10-20 DIAGNOSIS — M87.00 AVN (AVASCULAR NECROSIS OF BONE): ICD-10-CM

## 2022-10-20 PROCEDURE — 25010000002 KETOROLAC TROMETHAMINE PER 15 MG: Performed by: STUDENT IN AN ORGANIZED HEALTH CARE EDUCATION/TRAINING PROGRAM

## 2022-10-20 PROCEDURE — 97161 PT EVAL LOW COMPLEX 20 MIN: CPT

## 2022-10-20 PROCEDURE — S0260 H&P FOR SURGERY: HCPCS | Performed by: PHYSICIAN ASSISTANT

## 2022-10-20 PROCEDURE — 88304 TISSUE EXAM BY PATHOLOGIST: CPT | Performed by: STUDENT IN AN ORGANIZED HEALTH CARE EDUCATION/TRAINING PROGRAM

## 2022-10-20 PROCEDURE — 0 HYDROMORPHONE 1 MG/ML SOLUTION: Performed by: NURSE ANESTHETIST, CERTIFIED REGISTERED

## 2022-10-20 PROCEDURE — 25010000002 HYDROMORPHONE 1 MG/ML SOLUTION: Performed by: ANESTHESIOLOGY

## 2022-10-20 PROCEDURE — 27130 TOTAL HIP ARTHROPLASTY: CPT | Performed by: PHYSICIAN ASSISTANT

## 2022-10-20 PROCEDURE — 76000 FLUOROSCOPY <1 HR PHYS/QHP: CPT

## 2022-10-20 PROCEDURE — 25010000002 FENTANYL CITRATE (PF) 50 MCG/ML SOLUTION: Performed by: NURSE ANESTHETIST, CERTIFIED REGISTERED

## 2022-10-20 PROCEDURE — 73502 X-RAY EXAM HIP UNI 2-3 VIEWS: CPT

## 2022-10-20 PROCEDURE — 94799 UNLISTED PULMONARY SVC/PX: CPT

## 2022-10-20 PROCEDURE — 25010000002 ONDANSETRON PER 1 MG: Performed by: NURSE ANESTHETIST, CERTIFIED REGISTERED

## 2022-10-20 PROCEDURE — 25010000002 HYDROMORPHONE 1 MG/ML SOLUTION: Performed by: NURSE ANESTHETIST, CERTIFIED REGISTERED

## 2022-10-20 PROCEDURE — 27130 TOTAL HIP ARTHROPLASTY: CPT | Performed by: STUDENT IN AN ORGANIZED HEALTH CARE EDUCATION/TRAINING PROGRAM

## 2022-10-20 PROCEDURE — 25010000002 MORPHINE PER 10 MG: Performed by: STUDENT IN AN ORGANIZED HEALTH CARE EDUCATION/TRAINING PROGRAM

## 2022-10-20 PROCEDURE — 88311 DECALCIFY TISSUE: CPT | Performed by: STUDENT IN AN ORGANIZED HEALTH CARE EDUCATION/TRAINING PROGRAM

## 2022-10-20 PROCEDURE — 25010000002 EPINEPHRINE 1 MG/ML SOLUTION: Performed by: STUDENT IN AN ORGANIZED HEALTH CARE EDUCATION/TRAINING PROGRAM

## 2022-10-20 PROCEDURE — 25010000002 MIDAZOLAM PER 1 MG: Performed by: ANESTHESIOLOGY

## 2022-10-20 PROCEDURE — 25010000002 ROPIVACAINE PER 1 MG: Performed by: STUDENT IN AN ORGANIZED HEALTH CARE EDUCATION/TRAINING PROGRAM

## 2022-10-20 PROCEDURE — 25010000002 PROPOFOL 10 MG/ML EMULSION: Performed by: NURSE ANESTHETIST, CERTIFIED REGISTERED

## 2022-10-20 PROCEDURE — 25010000002 CEFAZOLIN IN DEXTROSE 2-4 GM/100ML-% SOLUTION: Performed by: STUDENT IN AN ORGANIZED HEALTH CARE EDUCATION/TRAINING PROGRAM

## 2022-10-20 PROCEDURE — 25010000002 KETOROLAC TROMETHAMINE PER 15 MG: Performed by: NURSE ANESTHETIST, CERTIFIED REGISTERED

## 2022-10-20 PROCEDURE — C1776 JOINT DEVICE (IMPLANTABLE): HCPCS | Performed by: STUDENT IN AN ORGANIZED HEALTH CARE EDUCATION/TRAINING PROGRAM

## 2022-10-20 PROCEDURE — 99203 OFFICE O/P NEW LOW 30 MIN: CPT | Performed by: PHYSICIAN ASSISTANT

## 2022-10-20 DEVICE — SHLL ACET G7 PPS LTD/HL TI SZF 54MM: Type: IMPLANTABLE DEVICE | Site: HIP | Status: FUNCTIONAL

## 2022-10-20 DEVICE — SCRW ACET CORT TRILOGY S/TAP 6.5X25: Type: IMPLANTABLE DEVICE | Site: HIP | Status: FUNCTIONAL

## 2022-10-20 DEVICE — BIOLOX® DELTA, CERAMIC FEMORAL HEAD, M, Ø 36/0, TAPER 12/14
Type: IMPLANTABLE DEVICE | Site: HIP | Status: FUNCTIONAL
Brand: BIOLOX® DELTA

## 2022-10-20 DEVICE — STEM FEM/HIP AVENIR/COMPLETE HI/OFFST HA SZ3: Type: IMPLANTABLE DEVICE | Site: HIP | Status: FUNCTIONAL

## 2022-10-20 DEVICE — LINER ACET G7 VIVACIT/E NTRL HXPE SZF 36MM: Type: IMPLANTABLE DEVICE | Site: HIP | Status: FUNCTIONAL

## 2022-10-20 DEVICE — TOTAL HIP PRIMARY: Type: IMPLANTABLE DEVICE | Site: HIP | Status: FUNCTIONAL

## 2022-10-20 RX ORDER — CEFAZOLIN SODIUM 2 G/100ML
2 INJECTION, SOLUTION INTRAVENOUS ONCE
Status: COMPLETED | OUTPATIENT
Start: 2022-10-20 | End: 2022-10-20

## 2022-10-20 RX ORDER — KETOROLAC TROMETHAMINE 30 MG/ML
INJECTION, SOLUTION INTRAMUSCULAR; INTRAVENOUS AS NEEDED
Status: DISCONTINUED | OUTPATIENT
Start: 2022-10-20 | End: 2022-10-20 | Stop reason: SURG

## 2022-10-20 RX ORDER — LIDOCAINE HYDROCHLORIDE 20 MG/ML
INJECTION, SOLUTION EPIDURAL; INFILTRATION; INTRACAUDAL; PERINEURAL AS NEEDED
Status: DISCONTINUED | OUTPATIENT
Start: 2022-10-20 | End: 2022-10-20 | Stop reason: SURG

## 2022-10-20 RX ORDER — FENTANYL CITRATE 50 UG/ML
INJECTION, SOLUTION INTRAMUSCULAR; INTRAVENOUS AS NEEDED
Status: DISCONTINUED | OUTPATIENT
Start: 2022-10-20 | End: 2022-10-20 | Stop reason: SURG

## 2022-10-20 RX ORDER — ACETAMINOPHEN 500 MG
1000 TABLET ORAL ONCE
Status: COMPLETED | OUTPATIENT
Start: 2022-10-20 | End: 2022-10-20

## 2022-10-20 RX ORDER — MEPERIDINE HYDROCHLORIDE 25 MG/ML
12.5 INJECTION INTRAMUSCULAR; INTRAVENOUS; SUBCUTANEOUS
Status: DISCONTINUED | OUTPATIENT
Start: 2022-10-20 | End: 2022-10-20

## 2022-10-20 RX ORDER — NICOTINE 21 MG/24HR
1 PATCH, TRANSDERMAL 24 HOURS TRANSDERMAL
Status: DISCONTINUED | OUTPATIENT
Start: 2022-10-20 | End: 2022-10-21 | Stop reason: HOSPADM

## 2022-10-20 RX ORDER — OXYCODONE HYDROCHLORIDE 5 MG/1
5 TABLET ORAL
Status: DISCONTINUED | OUTPATIENT
Start: 2022-10-20 | End: 2022-10-20

## 2022-10-20 RX ORDER — ONDANSETRON 4 MG/1
4 TABLET, FILM COATED ORAL EVERY 6 HOURS PRN
Status: DISCONTINUED | OUTPATIENT
Start: 2022-10-20 | End: 2022-10-21 | Stop reason: HOSPADM

## 2022-10-20 RX ORDER — OXYCODONE AND ACETAMINOPHEN 7.5; 325 MG/1; MG/1
1 TABLET ORAL EVERY 4 HOURS PRN
Status: DISCONTINUED | OUTPATIENT
Start: 2022-10-20 | End: 2022-10-21 | Stop reason: HOSPADM

## 2022-10-20 RX ORDER — PROMETHAZINE HYDROCHLORIDE 12.5 MG/1
25 TABLET ORAL ONCE AS NEEDED
Status: DISCONTINUED | OUTPATIENT
Start: 2022-10-20 | End: 2022-10-20

## 2022-10-20 RX ORDER — ACETAMINOPHEN 325 MG/1
325 TABLET ORAL EVERY 4 HOURS PRN
Status: DISCONTINUED | OUTPATIENT
Start: 2022-10-20 | End: 2022-10-21 | Stop reason: HOSPADM

## 2022-10-20 RX ORDER — ONDANSETRON 2 MG/ML
4 INJECTION INTRAMUSCULAR; INTRAVENOUS ONCE AS NEEDED
Status: DISCONTINUED | OUTPATIENT
Start: 2022-10-20 | End: 2022-10-20

## 2022-10-20 RX ORDER — OXYCODONE HYDROCHLORIDE 5 MG/1
5 TABLET ORAL EVERY 4 HOURS PRN
Status: DISCONTINUED | OUTPATIENT
Start: 2022-10-20 | End: 2022-10-20

## 2022-10-20 RX ORDER — SODIUM CHLORIDE, SODIUM LACTATE, POTASSIUM CHLORIDE, CALCIUM CHLORIDE 600; 310; 30; 20 MG/100ML; MG/100ML; MG/100ML; MG/100ML
100 INJECTION, SOLUTION INTRAVENOUS CONTINUOUS
Status: DISCONTINUED | OUTPATIENT
Start: 2022-10-20 | End: 2022-10-21 | Stop reason: HOSPADM

## 2022-10-20 RX ORDER — KETAMINE HCL IN NACL, ISO-OSM 100MG/10ML
SYRINGE (ML) INJECTION AS NEEDED
Status: DISCONTINUED | OUTPATIENT
Start: 2022-10-20 | End: 2022-10-20 | Stop reason: SURG

## 2022-10-20 RX ORDER — ATORVASTATIN CALCIUM 20 MG/1
20 TABLET, FILM COATED ORAL NIGHTLY
Status: DISCONTINUED | OUTPATIENT
Start: 2022-10-20 | End: 2022-10-21 | Stop reason: HOSPADM

## 2022-10-20 RX ORDER — AMOXICILLIN 250 MG
2 CAPSULE ORAL 2 TIMES DAILY
Status: DISCONTINUED | OUTPATIENT
Start: 2022-10-20 | End: 2022-10-21 | Stop reason: HOSPADM

## 2022-10-20 RX ORDER — ARIPIPRAZOLE 2 MG/1
2 TABLET ORAL DAILY
Status: DISCONTINUED | OUTPATIENT
Start: 2022-10-20 | End: 2022-10-21 | Stop reason: HOSPADM

## 2022-10-20 RX ORDER — NALOXONE HCL 0.4 MG/ML
0.4 VIAL (ML) INJECTION
Status: DISCONTINUED | OUTPATIENT
Start: 2022-10-20 | End: 2022-10-21 | Stop reason: HOSPADM

## 2022-10-20 RX ORDER — CEFAZOLIN SODIUM 2 G/100ML
2 INJECTION, SOLUTION INTRAVENOUS EVERY 8 HOURS
Status: COMPLETED | OUTPATIENT
Start: 2022-10-20 | End: 2022-10-21

## 2022-10-20 RX ORDER — MIDAZOLAM HYDROCHLORIDE 1 MG/ML
2 INJECTION INTRAMUSCULAR; INTRAVENOUS ONCE
Status: COMPLETED | OUTPATIENT
Start: 2022-10-20 | End: 2022-10-20

## 2022-10-20 RX ORDER — GLYCOPYRROLATE 0.2 MG/ML
0.2 INJECTION INTRAMUSCULAR; INTRAVENOUS
Status: COMPLETED | OUTPATIENT
Start: 2022-10-20 | End: 2022-10-20

## 2022-10-20 RX ORDER — ONDANSETRON 2 MG/ML
INJECTION INTRAMUSCULAR; INTRAVENOUS AS NEEDED
Status: DISCONTINUED | OUTPATIENT
Start: 2022-10-20 | End: 2022-10-20 | Stop reason: SURG

## 2022-10-20 RX ORDER — ASPIRIN 325 MG
325 TABLET, DELAYED RELEASE (ENTERIC COATED) ORAL EVERY 12 HOURS SCHEDULED
Status: DISCONTINUED | OUTPATIENT
Start: 2022-10-21 | End: 2022-10-21 | Stop reason: HOSPADM

## 2022-10-20 RX ORDER — ACETAMINOPHEN 500 MG
1000 TABLET ORAL EVERY 8 HOURS
Status: DISCONTINUED | OUTPATIENT
Start: 2022-10-20 | End: 2022-10-21 | Stop reason: HOSPADM

## 2022-10-20 RX ORDER — FAMOTIDINE 20 MG/1
40 TABLET, FILM COATED ORAL DAILY
Status: DISCONTINUED | OUTPATIENT
Start: 2022-10-20 | End: 2022-10-21 | Stop reason: HOSPADM

## 2022-10-20 RX ORDER — TRAZODONE HYDROCHLORIDE 100 MG/1
100 TABLET ORAL NIGHTLY PRN
Status: DISCONTINUED | OUTPATIENT
Start: 2022-10-20 | End: 2022-10-21 | Stop reason: HOSPADM

## 2022-10-20 RX ORDER — TRANEXAMIC ACID 10 MG/ML
2000 INJECTION, SOLUTION INTRAVENOUS ONCE
Status: DISCONTINUED | OUTPATIENT
Start: 2022-10-20 | End: 2022-10-20

## 2022-10-20 RX ORDER — SODIUM CHLORIDE, SODIUM LACTATE, POTASSIUM CHLORIDE, CALCIUM CHLORIDE 600; 310; 30; 20 MG/100ML; MG/100ML; MG/100ML; MG/100ML
9 INJECTION, SOLUTION INTRAVENOUS CONTINUOUS PRN
Status: DISCONTINUED | OUTPATIENT
Start: 2022-10-20 | End: 2022-10-21 | Stop reason: HOSPADM

## 2022-10-20 RX ORDER — ROCURONIUM BROMIDE 10 MG/ML
INJECTION, SOLUTION INTRAVENOUS AS NEEDED
Status: DISCONTINUED | OUTPATIENT
Start: 2022-10-20 | End: 2022-10-20 | Stop reason: SURG

## 2022-10-20 RX ORDER — PROMETHAZINE HYDROCHLORIDE 12.5 MG/1
12.5 SUPPOSITORY RECTAL EVERY 6 HOURS PRN
Status: DISCONTINUED | OUTPATIENT
Start: 2022-10-20 | End: 2022-10-21 | Stop reason: HOSPADM

## 2022-10-20 RX ORDER — CEFAZOLIN SODIUM IN 0.9 % NACL 3 G/100 ML
3 INTRAVENOUS SOLUTION, PIGGYBACK (ML) INTRAVENOUS ONCE
Status: DISCONTINUED | OUTPATIENT
Start: 2022-10-20 | End: 2022-10-20 | Stop reason: SDUPTHER

## 2022-10-20 RX ORDER — OXYCODONE AND ACETAMINOPHEN 10; 325 MG/1; MG/1
1 TABLET ORAL EVERY 4 HOURS PRN
Status: DISCONTINUED | OUTPATIENT
Start: 2022-10-20 | End: 2022-10-21 | Stop reason: HOSPADM

## 2022-10-20 RX ORDER — PROMETHAZINE HYDROCHLORIDE 12.5 MG/1
12.5 TABLET ORAL EVERY 6 HOURS PRN
Status: DISCONTINUED | OUTPATIENT
Start: 2022-10-20 | End: 2022-10-21 | Stop reason: HOSPADM

## 2022-10-20 RX ORDER — FERROUS SULFATE 325(65) MG
325 TABLET ORAL
Status: DISCONTINUED | OUTPATIENT
Start: 2022-10-20 | End: 2022-10-21 | Stop reason: HOSPADM

## 2022-10-20 RX ORDER — ONDANSETRON 2 MG/ML
4 INJECTION INTRAMUSCULAR; INTRAVENOUS EVERY 6 HOURS PRN
Status: DISCONTINUED | OUTPATIENT
Start: 2022-10-20 | End: 2022-10-21 | Stop reason: HOSPADM

## 2022-10-20 RX ORDER — PROPOFOL 10 MG/ML
VIAL (ML) INTRAVENOUS AS NEEDED
Status: DISCONTINUED | OUTPATIENT
Start: 2022-10-20 | End: 2022-10-20 | Stop reason: SURG

## 2022-10-20 RX ORDER — MAGNESIUM HYDROXIDE 1200 MG/15ML
LIQUID ORAL AS NEEDED
Status: DISCONTINUED | OUTPATIENT
Start: 2022-10-20 | End: 2022-10-20 | Stop reason: HOSPADM

## 2022-10-20 RX ORDER — OXYCODONE HYDROCHLORIDE 5 MG/1
10 TABLET ORAL EVERY 4 HOURS PRN
Status: DISCONTINUED | OUTPATIENT
Start: 2022-10-20 | End: 2022-10-20

## 2022-10-20 RX ORDER — CELECOXIB 100 MG/1
200 CAPSULE ORAL ONCE
Status: COMPLETED | OUTPATIENT
Start: 2022-10-20 | End: 2022-10-20

## 2022-10-20 RX ORDER — KETOROLAC TROMETHAMINE 30 MG/ML
15 INJECTION, SOLUTION INTRAMUSCULAR; INTRAVENOUS EVERY 6 HOURS
Status: COMPLETED | OUTPATIENT
Start: 2022-10-20 | End: 2022-10-21

## 2022-10-20 RX ORDER — PROMETHAZINE HYDROCHLORIDE 25 MG/1
25 SUPPOSITORY RECTAL ONCE AS NEEDED
Status: DISCONTINUED | OUTPATIENT
Start: 2022-10-20 | End: 2022-10-20

## 2022-10-20 RX ORDER — POVIDONE-IODINE 10 MG/ML
SOLUTION TOPICAL ONCE
Status: COMPLETED | OUTPATIENT
Start: 2022-10-20 | End: 2022-10-20

## 2022-10-20 RX ADMIN — POVIDONE-IODINE 4 APPLICATION: 10 SOLUTION TOPICAL at 06:32

## 2022-10-20 RX ADMIN — HYDROMORPHONE HYDROCHLORIDE 0.5 MG: 1 INJECTION, SOLUTION INTRAMUSCULAR; INTRAVENOUS; SUBCUTANEOUS at 10:04

## 2022-10-20 RX ADMIN — HYDROMORPHONE HYDROCHLORIDE 0.5 MG: 1 INJECTION, SOLUTION INTRAMUSCULAR; INTRAVENOUS; SUBCUTANEOUS at 09:45

## 2022-10-20 RX ADMIN — SODIUM CHLORIDE, POTASSIUM CHLORIDE, SODIUM LACTATE AND CALCIUM CHLORIDE 9 ML/HR: 600; 310; 30; 20 INJECTION, SOLUTION INTRAVENOUS at 06:39

## 2022-10-20 RX ADMIN — Medication 1 PATCH: at 12:34

## 2022-10-20 RX ADMIN — CEFAZOLIN SODIUM 2 G: 2 INJECTION, SOLUTION INTRAVENOUS at 14:09

## 2022-10-20 RX ADMIN — KETOROLAC TROMETHAMINE 15 MG: 30 INJECTION, SOLUTION INTRAMUSCULAR; INTRAVENOUS at 20:21

## 2022-10-20 RX ADMIN — ROCURONIUM BROMIDE 50 MG: 10 INJECTION INTRAVENOUS at 07:17

## 2022-10-20 RX ADMIN — SENNOSIDES AND DOCUSATE SODIUM 2 TABLET: 8.6; 5 TABLET ORAL at 20:21

## 2022-10-20 RX ADMIN — OXYCODONE HYDROCHLORIDE AND ACETAMINOPHEN 1 TABLET: 10; 325 TABLET ORAL at 14:08

## 2022-10-20 RX ADMIN — LIDOCAINE HYDROCHLORIDE 100 MG: 20 INJECTION, SOLUTION EPIDURAL; INFILTRATION; INTRACAUDAL; PERINEURAL at 07:17

## 2022-10-20 RX ADMIN — KETOROLAC TROMETHAMINE 15 MG: 30 INJECTION, SOLUTION INTRAMUSCULAR; INTRAVENOUS at 14:09

## 2022-10-20 RX ADMIN — ARIPIPRAZOLE 2 MG: 2 TABLET ORAL at 12:33

## 2022-10-20 RX ADMIN — SENNOSIDES AND DOCUSATE SODIUM 2 TABLET: 8.6; 5 TABLET ORAL at 12:33

## 2022-10-20 RX ADMIN — PROPOFOL 200 MG: 10 INJECTION, EMULSION INTRAVENOUS at 07:17

## 2022-10-20 RX ADMIN — OXYCODONE HYDROCHLORIDE AND ACETAMINOPHEN 1 TABLET: 10; 325 TABLET ORAL at 22:16

## 2022-10-20 RX ADMIN — ACETAMINOPHEN 1000 MG: 500 TABLET, FILM COATED ORAL at 06:49

## 2022-10-20 RX ADMIN — KETOROLAC TROMETHAMINE 30 MG: 30 INJECTION, SOLUTION INTRAMUSCULAR; INTRAVENOUS at 09:05

## 2022-10-20 RX ADMIN — CEFAZOLIN SODIUM 2 G: 2 INJECTION, SOLUTION INTRAVENOUS at 23:32

## 2022-10-20 RX ADMIN — MIDAZOLAM 2 MG: 1 INJECTION, SOLUTION INTRAMUSCULAR; INTRAVENOUS at 06:50

## 2022-10-20 RX ADMIN — MORPHINE SULFATE 4 MG: 4 INJECTION, SOLUTION INTRAMUSCULAR; INTRAVENOUS at 20:27

## 2022-10-20 RX ADMIN — OXYCODONE HYDROCHLORIDE 5 MG: 5 TABLET ORAL at 12:33

## 2022-10-20 RX ADMIN — SUGAMMADEX 200 MG: 100 INJECTION, SOLUTION INTRAVENOUS at 09:05

## 2022-10-20 RX ADMIN — ATORVASTATIN CALCIUM 20 MG: 20 TABLET, FILM COATED ORAL at 20:21

## 2022-10-20 RX ADMIN — FERROUS SULFATE TAB 325 MG (65 MG ELEMENTAL FE) 325 MG: 325 (65 FE) TAB at 12:33

## 2022-10-20 RX ADMIN — CEFAZOLIN SODIUM 2 G: 2 INJECTION, SOLUTION INTRAVENOUS at 07:18

## 2022-10-20 RX ADMIN — ONDANSETRON 4 MG: 2 INJECTION INTRAMUSCULAR; INTRAVENOUS at 07:32

## 2022-10-20 RX ADMIN — OXYCODONE HYDROCHLORIDE AND ACETAMINOPHEN 1 TABLET: 10; 325 TABLET ORAL at 18:00

## 2022-10-20 RX ADMIN — Medication 50 MG: at 07:37

## 2022-10-20 RX ADMIN — HYDROMORPHONE HYDROCHLORIDE 0.25 MG: 1 INJECTION, SOLUTION INTRAMUSCULAR; INTRAVENOUS; SUBCUTANEOUS at 10:22

## 2022-10-20 RX ADMIN — FAMOTIDINE 40 MG: 20 TABLET ORAL at 12:33

## 2022-10-20 RX ADMIN — HYDROMORPHONE HYDROCHLORIDE 1 MG: 1 INJECTION, SOLUTION INTRAMUSCULAR; INTRAVENOUS; SUBCUTANEOUS at 10:42

## 2022-10-20 RX ADMIN — CELECOXIB 200 MG: 100 CAPSULE ORAL at 06:50

## 2022-10-20 RX ADMIN — SODIUM CHLORIDE, POTASSIUM CHLORIDE, SODIUM LACTATE AND CALCIUM CHLORIDE 100 ML/HR: 600; 310; 30; 20 INJECTION, SOLUTION INTRAVENOUS at 12:34

## 2022-10-20 RX ADMIN — FENTANYL CITRATE 100 MCG: 50 INJECTION, SOLUTION INTRAMUSCULAR; INTRAVENOUS at 07:17

## 2022-10-20 RX ADMIN — HYDROMORPHONE HYDROCHLORIDE 1 MG: 1 INJECTION, SOLUTION INTRAMUSCULAR; INTRAVENOUS; SUBCUTANEOUS at 07:54

## 2022-10-20 RX ADMIN — SODIUM CHLORIDE, POTASSIUM CHLORIDE, SODIUM LACTATE AND CALCIUM CHLORIDE 100 ML/HR: 600; 310; 30; 20 INJECTION, SOLUTION INTRAVENOUS at 20:29

## 2022-10-20 RX ADMIN — GLYCOPYRROLATE 0.2 MG: 0.2 INJECTION INTRAMUSCULAR; INTRAVENOUS at 06:50

## 2022-10-20 NOTE — H&P
[facility]   HISTORY AND PHYSICAL    Patient Name: Vasiliy Van  : 1980  MRN: [id number]  Primary Care Physician:  Lynn Siddiqi APRN  Date of admission: 10/20/2022    Subjective   Subjective     Chief Complaint: Right Hip Pain    History of Present Illness    Vasiliy presents for his right hip. Patient has avascular necrosis that we have been treating nonoperatively. His pain has continued to worsen over the past several months. He describes constant pain that interferes with daily activities. He has not had success with nonoperative management and is interested in operative management. Patient has previous had a left total hip arthroplasty for avascular necrosis and did very well.       Review of Systems     Constitutional: Denies fevers, chills, weight loss  Cardiovascular: Denies chest pain, shortness of breath  Skin: Denies rashes, acute skin changes  Neurologic: Denies headache, loss of consciousness  MSK: Right hip pain    Personal History     Past Medical History:   Diagnosis Date   • Arthritis    • Avascular necrosis (HCC)     RIGHT HIP   • GERD (gastroesophageal reflux disease)    • High blood pressure    • High cholesterol    • Insomnia    • Seizure (HCC)     X1 FELT D/T BUPROPION DENIES ANY FURTHER ISSUES   • Sleep apnea     NO CPAP   • Spinal headache        Past Surgical History:   Procedure Laterality Date   • ENDOSCOPY N/A 2021    Procedure: ESOPHAGOGASTRODUODENOSCOPY with biopsy;  Surgeon: Satish Mejia MD;  Location: [facility] ENDOSCOPY;  Service: General;  Laterality: N/A;  SLIDING HIATAL HERNIA   • HIP SURGERY Left 2020    Total hip replacement   • JOINT REPLACEMENT      TOTAL HIP LEFT SIDE   • LUMBAR LAMINECTOMY         Family History: family history includes Diabetes in his mother; Heart disease in his father. Otherwise pertinent FHx was reviewed and not pertinent to current issue.    Social History:  reports that he has been smoking cigarettes. He has a  15.00 pack-year smoking history. He has never used smokeless tobacco. He reports that he does not drink alcohol and does not use drugs.    Home Medications:  ARIPiprazole, Levomilnacipran HCl ER, atorvastatin, lisinopril, and traZODone    Allergies:  Allergies   Allergen Reactions   • Bupropion Seizure       Objective    Objective     Vitals:   Temp:  [97.6 °F (36.4 °C)] 97.6 °F (36.4 °C)  Heart Rate:  [90] 90  Resp:  [20] 20  BP: (145)/(93) 145/93    Physical Exam    General: Alert. No acute distress.  Cardiac: Intact peripheral pulses  Pulmonary: Unlabored respirations on room air.   Right lower extremity: Right hip- tender to trochanteric bursa. Internal Rotation 10. Flexion 90. External Rotation 30. 4/5 hip flexion. 5/5 hip Abduction. Pain with hip motion. Symmetric leg lengths. Neurovascularly intact. Positive EHL, FHL, GS and TA. Sensation intact to all 5 nerves of the foot. Positive pulses. No knee effusion or joint line pain. Slight antalgic gait favoring the right hip.     Result Review    Result Review:  I have personally reviewed the results from the time of this admission to 10/20/2022 06:22 EDT and agree with these findings:  [x]  Laboratory list / accordion  []  Microbiology  [x]  Radiology  []  EKG/Telemetry   []  Cardiology/Vascular   []  Pathology  []  Old records  []  Other:        Assessment & Plan   Assessment / Plan     Brief Patient Summary:  Vasiliy Van is a 42 y.o. male who presents with right hip avascular necrosis. Patient presents today for surgical management with right total hip arthroplasty.     Active Hospital Problems:  Active Hospital Problems    Diagnosis    • Osteoarthritis of right hip    • AVN (avascular necrosis of bone) ([facility])      Plan:     Vasiliy presents with right hip avascular necrosis now with early subchondral collapse.  We discussed treatment options.  We discussed postoperative and nonoperative management. We specifically discussed right total hip  arthroplasty.  We discussed the anterior approach.  We discussed the primary benefits of surgery including improved pain and improving function.  We discussed surgical risk including bleeding, infection, damage nerves and blood vessels, hardware complications, fracture, loosening, instability, leg length discrepancy, persistent pain, anesthesia risk, DVT/PE, and need for additional procedures.  We discussed smoking risks as it relates to hip replacement surgery and options for smoking cessation. He elected to proceed with surgical management.      DVT prophylaxis:  Mechanical DVT prophylaxis orders are present.        Rosa Munoz PA-C      I have seen and examined the above patient and agree with the plan.     Cardiac: Intact peripheral pulses  Pulmonary: Nonlabored respirations on room air.   RLE: Skin intact. Distal neurovascular intact.       We reviewed the risks, benefits, indications, and alternatives to right total hip arthroplasty as noted above. The patient elected to proceed and consent was obtained.       Electronically signed by Shantanu Mendez MD, 10/20/22, 6:39 AM EDT.

## 2022-10-20 NOTE — PLAN OF CARE
Goal Outcome Evaluation:  Plan of Care Reviewed With: patient           Outcome Evaluation: Pt presents with deficits in ambulation, strength, and mobility. Pt will benefit from skilled therapy intervention. Recommend outpatient therapy services upon discharge.

## 2022-10-20 NOTE — PLAN OF CARE
Goal Outcome Evaluation:       Patient medicated for pain twice this shift. Patient has ETCO2 monitor on. Gets up and ambulates with 1 person assistance. Anticipating discharge home tomorrow.

## 2022-10-20 NOTE — OP NOTE
TOTAL HIP ARTHROPLASTY ANTERIOR  Procedure Report    Patient Name:  Vasiliy Van  YOB: 1980    Date of Surgery:  10/20/2022     Indications: Vasiliy is a 42-year-old male with right hip avascular necrosis.  He previously had a left total hip arthroplasty for avascular necrosis approximately 10 years ago and did very well with that.  We discussed treatment options.  We discussed additional nonoperative management.  We discussed surgical management with right total hip arthroplasty.  We discussed surgical benefits including pain relief and improved function.  We discussed surgical risks including bleeding, infection, damage to nerves or blood vessels, hardware complications, fracture, loosening, dislocation, leg length discrepancy, persistent pain, anesthesia risk including mortality, DVT, and need for additional procedures.  The patient elected to proceed and consent was obtained.    Pre-op Diagnosis:   Osteoarthritis of right hip, unspecified osteoarthritis type [M16.11]  AVN (avascular necrosis of bone) ([facility]) [M87.00]       Post-Op Diagnosis Codes:     * Osteoarthritis of right hip, unspecified osteoarthritis type [M16.11]     * AVN (avascular necrosis of bone) (HCC) [M87.00]    Procedure/CPT® Codes:      Procedure(s):  RIGHT TOTAL HIP ANTERIOR ARTHOPLASTY    Surgical Approach: Hip Direct Anterior ([place])        Staff:  Surgeon(s):  Shantanu Mendez MD    Assistant: Rosa Munoz PA-C; Gricel Harris    Anesthesia: General    Estimated Blood Loss: 200 mL    Implants:    Implant Name Type Inv. Item Serial No.  Lot No. LRB No. Used Action   SHLL ACET G7 PPS LTD/HL TI SZF 54MM - [id number] Implant SHLL ACET G7 PPS LTD/HL TI SZF 54MM  [employer] [id number] Right 1 Implanted   SCRW ACET VIDAL TRILOGY S/TAP 6.5X25 - [id number] Implant SCRW ACET VIDAL TRILOGY S/TAP 6.5X25  [employer] [id number] Right 1 Implanted   LINER ACET G7 VIVACIT/E NTRL HXPE SZF 36MM - [id number] Implant  LINER ACET G7 VIVACIT/E NTRL HXPE SZF 36MM  [employer] [id number] Right 1 Implanted   STEM FEM/HIP AVENIR/COMPLETE HI/OFFST HA SZ3 - [id number] Implant STEM FEM/HIP AVENIR/COMPLETE HI/OFFST HA SZ3  Bablic [id number] Right 1 Implanted   HD FEM/HIP BIOLOX/DELTA CERAM 12/[id number] PLS0MM - [id number] Implant HD FEM/HIP BIOLOX/DELTA CERAM 12/[id number] PLS0MM  [employer] [id number] Right 1 Implanted       Specimen:          Specimens     ID Source Type Tests Collected By Collected At Frozen?    A Hip, Right Bone · TISSUE PATHOLOGY EXAM   Shantanu Mendez MD 10/20/22 0861     Description: RIGHT FEMORAL HEAD              Findings: Right hip avascular necrosis    Complications: None    Description of Procedure: The patient was met in the preoperative holding area and the operative extremity was marked.  The patient was transported to the operating room and general anesthesia was induced without complication.  The patient was then carefully transferred onto the [place] table with both legs well-padded and placed into the traction boots.  2 g of preoperative Ancef was administered.  The right hip was then prepped and draped in the usual sterile fashion.  A timeout was taken to ensure the appropriate patient, procedure, and procedural site.  All were in agreement.  I made a 10 cm longitudinal incision beginning 2 cm distal and lateral to the ASIS and extending toward the fibular head.  Sharp dissection was carried down through the skin and subcutaneous tissues.  Hemostasis was obtained with Bovie electrocautery.  The fascia over the TFL was identified.  This was split sharply in line with the incision.  The fascia was elevated.  The TFL was mobilized.  A Cobra retractor was placed over the superior aspect of the femoral neck.  A Eldridge elevator was used to develop the interval between the rectus and the anterior aspect of the femur.  A Cobra retractor was then placed over the inferior aspect of the femoral neck.  At the distal  aspect of the interval I identified the circumflex femoral vessels.  These were cauterized.  I then performed a T-shaped capsulotomy.  The anterior and posterior limbs of the capsule were tagged with nonabsorbable sutures.  The Cobra retractors were placed intracapsular.  I then planned and performed a femoral neck cut using C arm assistance.  This was done without complication.  The cut was completed laterally with an osteotome.  Traction was applied to the leg through the [place] table.  I utilized a corkscrew on power to remove the femoral head without complication. Acetabular retractors were positioned over the anterior and posterior wall without complication.  I sharply excised the labrum from the periphery of the acetabulum.  The fatty tissue in the fossa was removed with a rongeur. I began reaming under direct visualization.  The head was sized at a 54.  I started with a 49 reamer.  I medialized down to the medial wall.  I then reamed under C-arm guidance sequentially up to a size 53 reamer.  This had adequate purchase.  I was satisfied with the abduction and anteversion radiographically.  Adequate bleeding bone was present under direct visualization and the anterior and posterior walls were intact.  I irrigated the acetabulum.  I then impacted a Fidel G7 54 mm cup, matching my previous reamer positioning.  This was done without complication.  The cup had adequate purchase.  The cup was irrigated and was fully seated by direct visualization.  I then used C-arm guidance to drill, measure, and place 1 acetabular screw of size 25 mm in length.  This had excellent purchase.  The cup was again irrigated.  The neutral acetabular liner was then impacted without complication and was fully seated.  Acetabular retractors were then removed.  The femoral hook for the [place] table attachment was then inserted.  All traction was removed from the leg.  The leg was dropped into extension, external rotation, and adduction.  I  then performed a capsular release and released soft tissue over the greater trochanter including the piriformis.  I had adequate visualization of the proximal femur.  I used a rongeur to remove the femoral neck remnant and lateralize proximally.  I then identified the femoral canal utilizing the curved rasp.  I used this to lateralize as well.  I then began broaching.  I started with the starting broach and sequentially broached up to a size 3 for the Avenir Complete system which had excellent purchase.  I calcar planed the neck at this level.  I inserted a -3.5 neck trial with a 36 mm head.  Retractors removed and the hip was reduced.  This was stable in extension and external rotation at 90 degrees.  C-arm fluoroscopy was brought in.  Femoral component sizing was appropriate and no periprosthetic fractures were noted.  The operative leg was slightly short radiographically. The hip was dislocated without complication.  The leg was repositioned and retractors were inserted.  Inserted a offset neck with standard length.  The hip was again reduced.  Satisfied with the leg lengths radiographically.  The hip was stable in external rotation and extension.  Trial components were removed.  I irrigated the proximal femur with normal saline.  I then impacted the size 3 offset Avenir Complete stem without complication, matching my previous version.  The trunnion and acetabulum were thoroughly irrigated.  The acetabulum was clear.  I impacted the size 36 mm head with standard neck without complication.  This was reduced.  The hip was again stable in extension and external rotation at 90 degrees.  Final images were obtained with C arm.  I was satisfied with component positioning.  No complications were noted.  The hip was thoroughly irrigated with Irrisept followed by normal saline.  2 g of topical TXA was applied. Adequate hemostasis was obtained.  The capsule was closed with the nonabsorbable tag sutures.  Fascia over the TFL  was closed with 0 Vicryl in running fashion.  Periarticular local anesthetic was injected.  Subcutaneous tissues were closed with 0 Vicryl and 2-0 Vicryl.  Skin was closed with nylon suture in interrupted fashion. The patient was carefully transported off the [place] table back onto her hospital bed.  Leg lengths and rotation were symmetric.  The patient had palpable pedal pulses.  Patient woke from anesthesia without complication and was transferred to the recovery room in stable condition.  All surgical counts were correct.    Assistant: Rosa Munoz PA-C; Gricel Harris  was responsible for performing the following activities: Retraction, Suction, Irrigation, Closing and Placing Dressing and their skilled assistance was necessary for the success of this case.    Shantanu Mendez MD     Date: 10/20/2022  Time: 09:30 EDT

## 2022-10-20 NOTE — THERAPY EVALUATION
Acute Care - Physical Therapy Initial Evaluation   Al     Patient Name: Vasiliy Van  : 1980  MRN: [id number]  Today's Date: 10/20/2022       Admit date: 10/20/2022     Referring Physician: Daniel Medina MD     Surgery Date:10/20/2022   Procedure(s) (LRB):  RIGHT TOTAL HIP ANTERIOR ARTHOPLASTY (Right)        Visit Dx:     ICD-10-CM ICD-9-CM   1. Difficulty in walking  R26.2 719.7   2. AVN (avascular necrosis of bone) (HCC)  M87.00 733.40   3. Osteoarthritis of right hip, unspecified osteoarthritis type  M16.11 715.95     Patient Active Problem List   Diagnosis   • Nausea with vomiting   • GERD without esophagitis   • Right hip pain   • AVN (avascular necrosis of bone) (HCC)   • Osteoarthritis of right hip   • Arthritis of right hip     Past Medical History:   Diagnosis Date   • Arthritis    • Avascular necrosis (HCC)     RIGHT HIP   • GERD (gastroesophageal reflux disease)    • High blood pressure    • High cholesterol    • Insomnia    • Seizure (HCC)     X1 FELT D/T BUPROPION DENIES ANY FURTHER ISSUES   • Sleep apnea     NO CPAP   • Spinal headache      Past Surgical History:   Procedure Laterality Date   • ENDOSCOPY N/A 2021    Procedure: ESOPHAGOGASTRODUODENOSCOPY with biopsy;  Surgeon: Satish Mejia MD;  Location: [facility] ENDOSCOPY;  Service: General;  Laterality: N/A;  SLIDING HIATAL HERNIA   • HIP SURGERY Left 2020    Total hip replacement   • JOINT REPLACEMENT      TOTAL HIP LEFT SIDE   • LUMBAR LAMINECTOMY       PT Assessment (last 12 hours)     PT Evaluation and Treatment     Row Name 10/20/22 1121          Physical Therapy Time and Intention    Subjective Information complains of;pain  -TUAN     Document Type evaluation  -TUAN     Mode of Treatment individual therapy;physical therapy  -TUAN     Patient Effort good  -TUAN     Symptoms Noted During/After Treatment increased pain  -TUAN     Row Name 10/20/22 1121          General Information    Patient Profile Reviewed yes  -TUAN      Patient Observations alert;cooperative;agree to therapy  -TUAN     Prior Level of Function independent:  -TUAN     Equipment Currently Used at Home none  -TUAN     Barriers to Rehab none identified  -TUAN     Row Name 10/20/22 1121          Living Environment    Current Living Arrangements apartment  -TUAN     People in Home alone  -TUAN     Primary Care Provided by self  -TUAN     Row Name 10/20/22 1121          Home Use of Assistive/Adaptive Equipment    Equipment Currently Used at Home none  -TUAN     Row Name 10/20/22 1121          Range of Motion (ROM)    Range of Motion ROM is WFL  -TUAN     Row Name 10/20/22 1121          Strength (Manual Muscle Testing)    Strength (Manual Muscle Testing) right lower extremity  Hip flex = not measured due to pain; Knee ext = 3+/5 (limited by pain); Knee flex = 3+/5 (limited by pain); DF 5/5  -TUAN     Row Name 10/20/22 1121          Bed Mobility    Bed Mobility supine-sit  -TUAN     Supine-Sit [place] (Bed Mobility) minimum assist (75% patient effort)  assistance in moving RLE  -TUAN     Row Name 10/20/22 1121          Transfers    Transfers sit-stand transfer;stand-sit transfer  -TUAN     Row Name 10/20/22 1121          Sit-Stand Transfer    Sit-Stand [place] (Transfers) contact guard  -TUAN     Assistive Device (Sit-Stand Transfers) walker, front-wheeled  -TUAN     Row Name 10/20/22 1121          Stand-Sit Transfer    Stand-Sit [place] (Transfers) contact guard  -TUAN     Assistive Device (Stand-Sit Transfers) walker, front-wheeled  -TUAN     Row Name 10/20/22 1121          Gait/Stairs (Locomotion)    [place] Level (Gait) contact guard  -TUAN     Assistive Device (Gait) walker, front-wheeled  -TUAN     Ambulated day of surgery or within 4 hours of PACU discharge yes  -TUAN     Distance in Feet (Gait) 10  -TUAN     Pattern (Gait) step-to  -TUAN     Row Name 10/20/22 1121          Balance    Balance Assessment standing dynamic balance  -TUAN     Dynamic Standing Balance contact guard  -TUAN      Position/Device Used, Standing Balance walker, front-wheeled  -TUAN     Row Name             Wound 10/20/22 0804 Left anterior hip Incision    Wound - Properties Group Placement Date: 10/20/22  -SC Placement Time: 0804  -SC Present on Hospital Admission: N  -SC Side: Left  -SC Orientation: anterior  -SC Location: hip  -SC Primary Wound Type: Incision  -SC    Retired Wound - Properties Group Placement Date: 10/20/22  -SC Placement Time: 0804  -SC Present on Hospital Admission: N  -SC Side: Left  -SC Orientation: anterior  -SC Location: hip  -SC Primary Wound Type: Incision  -SC    Retired Wound - Properties Group Date first assessed: 10/20/22  -SC Time first assessed: 0804  -SC Present on Hospital Admission: N  -SC Side: Left  -SC Location: hip  -SC Primary Wound Type: Incision  -SC    Row Name 10/20/22 1121          Plan of Care Review    Plan of Care Reviewed With patient  -TUAN     Outcome Evaluation Pt presents with deficits in ambulation, strength, and mobility. Pt will benefit from skilled therapy intervention. Recommend outpatient therapy services upon discharge.  -TUAN     Row Name 10/20/22 1122          Therapy Assessment/Plan (PT)    Patient/Family Therapy Goals Statement (PT) to feel better  -TUAN     Rehab Potential (PT) good, to achieve stated therapy goals  -TUAN     Criteria for Skilled Interventions Met (PT) skilled treatment is necessary  -TUAN     Therapy Frequency (PT) 2 times/day  -TUAN     Predicted Duration of Therapy Intervention (PT) 10 days  -TUAN     Problem List (PT) problems related to;mobility;strength  -TUAN     Activity Limitations Related to Problem List (PT) unable to ambulate safely  -TUAN     Row Name 10/20/22 1122          PT Evaluation Complexity    History, PT Evaluation Complexity no personal factors and/or comorbidities  -TUAN     Examination of Body Systems (PT Eval Complexity) 1-2 elements  -TUAN     Clinical Presentation (PT Evaluation Complexity) stable  -TUAN     Clinical Decision Making (PT  Evaluation Complexity) low complexity  -TUAN     Overall Complexity (PT Evaluation Complexity) low complexity  -TUAN     Row Name 10/20/22 1122          Physical Therapy Goals    Bed Mobility Goal Selection (PT) bed mobility, PT goal 1  -TUAN     Transfer Goal Selection (PT) transfer, PT goal 1  -TUAN     Gait Training Goal Selection (PT) gait training, PT goal 1  -TUAN     Row Name 10/20/22 1122          Bed Mobility Goal 1 (PT)    Activity/Assistive Device (Bed Mobility Goal 1, PT) bed mobility activities, all  -TUAN     [place] Level/Cues Needed (Bed Mobility Goal 1, PT) independent  -TUAN     Time Frame (Bed Mobility Goal 1, PT) long term goal (LTG);10 days  -TUAN     Row Name 10/20/22 1122          Transfer Goal 1 (PT)    Activity/Assistive Device (Transfer Goal 1, PT) transfers, all  -TUAN     [place] Level/Cues Needed (Transfer Goal 1, PT) independent  -TUAN     Time Frame (Transfer Goal 1, PT) 10 days;long term goal (LTG)  -TUAN     Row Name 10/20/22 1122          Gait Training Goal 1 (PT)    Activity/Assistive Device (Gait Training Goal 1, PT) gait (walking locomotion)  -TUAN     [place] Level (Gait Training Goal 1, PT) modified independence  -TUAN     Distance (Gait Training Goal 1, PT) 300  -TUAN     Time Frame (Gait Training Goal 1, PT) long term goal (LTG);10 days  -TUAN           User Key  (r) = Recorded By, (t) = Taken By, (c) = Cosigned By    Initials Name Provider Type    Mansi Molina RN Registered Nurse    Maico Quiroga, PT Physical Therapist                Physical Therapy Education     Title: PT OT SLP Therapies (Done)     Topic: Physical Therapy (Done)     Point: Mobility training (Done)     Learning Progress Summary           Patient Eager, E, VU by TUAN at 10/20/2022 1132                               User Key     Initials Effective Dates Name Provider Type Discipline    TUAN 06/03/21 -  Maico Lindsay PT Physical Therapist PT              PT Recommendation and Plan  Planned Therapy  Interventions (PT): gait training, strengthening, ROM (range of motion)  Therapy Frequency (PT): 2 times/day  Plan of Care Reviewed With: patient  Outcome Evaluation: Pt presents with deficits in ambulation, strength, and mobility. Pt will benefit from skilled therapy intervention. Recommend outpatient therapy services upon discharge.   Outcome Measures     Row Name 10/20/22 1100             How much help from another person do you currently need...    Turning from your back to your side while in flat bed without using bedrails? 3  -TUAN      Moving from lying on back to sitting on the side of a flat bed without bedrails? 3  -TUAN      Moving to and from a bed to a chair (including a wheelchair)? 3  -TUAN      Standing up from a chair using your arms (e.g., wheelchair, bedside chair)? 3  -TUAN      Climbing 3-5 steps with a railing? 2  -TUAN      To walk in hospital room? 3  -TUAN      AM-PAC 6 Clicks Score (PT) 17  -TUAN         Functional Assessment    Outcome Measure Options AM-PAC 6 Clicks Basic Mobility (PT)  -TUAN            User Key  (r) = Recorded By, (t) = Taken By, (c) = Cosigned By    Initials Name Provider Type    Maico Quiroga PT Physical Therapist                 Time Calculation:    PT Charges     Row Name 10/20/22 1121             Time Calculation    PT Received On 10/20/22  -TUAN      PT Goal Re-Cert Due Date 10/30/22  -TUAN         Untimed Charges    PT Eval/Re-eval Minutes 31  -TUAN         Total Minutes    Untimed Charges Total Minutes 31  -TUAN       Total Minutes 31  -TUAN            User Key  (r) = Recorded By, (t) = Taken By, (c) = Cosigned By    Initials Name Provider Type    Maico Quiroga PT Physical Therapist              Therapy Charges for Today     Code Description Service Date Service Provider Modifiers Qty    75501685201 HC PT EVAL LOW COMPLEXITY 3 10/20/2022 Maico Lindsay PT GP 1          PT G-Codes  Outcome Measure Options: AM-PAC 6 Clicks Basic Mobility (PT)  AM-PAC 6 Clicks Score (PT):  17    Maico Lindsay, PT  10/20/2022

## 2022-10-20 NOTE — H&P
[facility]   HOSPITALIST HISTORY AND PHYSICAL  Date: 10/20/2022   Patient Name: Vasiliy Van  : 1980  MRN: [id number]  Primary Care Physician:  Lynn Siddiqi APRN  Date of admission: 10/20/2022    Subjective   Subjective   Chief Complaint: Medical management    HPI:  Mr. Vasiliy Van is a 42 y.o. male who is being seen by hospitalist today for general medical management of chronic medical conditions.  He has a history of avascular necrosis / hips as well as HTN, GERD, Depression, Insomnia, Substance abuse disorder, Tobacco use, Dyslipidemia, CHARLY (no CPAP).  He does not have a CPAP machine.  He had R total hip arthroplasty by Dr. Mendez, 10/20/22.  Currently, he is alert and conversational.  He denies any post op nausea.  His R hip aches but is to be expected from surgery earlier.  Vitals are stable.  On room air.  He is normotensive.  Recent lab work reviewed.  He took all his home meds this morning prior to surgery.   He is currently on:    Current Outpatient Medications   Medication Instructions   • ARIPiprazole (ABILIFY) 2 mg, Oral, Daily   • atorvastatin (LIPITOR) 20 mg, Oral, Daily   • Fetzima 120 mg, Oral, Daily   • lisinopril (PRINIVIL,ZESTRIL) 30 mg, Oral, Daily   • traZODone (DESYREL) 100 mg, Oral, Nightly       Pertinent History   Past Medical History:    Active Ambulatory Problems     Diagnosis Date Noted   • Nausea with vomiting 2021   • GERD without esophagitis 2021   • Right hip pain 2022   • AVN (avascular necrosis of bone) (HCC) 2022   • Osteoarthritis of right hip 2022   • Arthritis of right hip 2022     Resolved Ambulatory Problems     Diagnosis Date Noted   • No Resolved Ambulatory Problems     Past Medical History:   Diagnosis Date   • Arthritis    • Avascular necrosis (HCC)    • GERD (gastroesophageal reflux disease)    • High blood pressure    • High cholesterol    • Insomnia    • Seizure (HCC)    • Sleep apnea    • Spinal  headache        Past Surgical History:    Past Surgical History:   Procedure Laterality Date   • ENDOSCOPY N/A 06/22/2021    Procedure: ESOPHAGOGASTRODUODENOSCOPY with biopsy;  Surgeon: Satish Mejia MD;  Location: [facility] ENDOSCOPY;  Service: General;  Laterality: N/A;  SLIDING HIATAL HERNIA   • HIP SURGERY Left 01/2020    Total hip replacement   • JOINT REPLACEMENT      TOTAL HIP LEFT SIDE   • LUMBAR LAMINECTOMY  2001       Social History:     Social History     Socioeconomic History   • Marital status: Legally    Tobacco Use   • Smoking status: Every Day     Packs/day: 1.00     Years: 15.00     Pack years: 15.00     Types: Cigarettes   • Smokeless tobacco: Never   • Tobacco comments:     Last smoked 5am 10/20   Vaping Use   • Vaping Use: Never used   Substance and Sexual Activity   • Alcohol use: Never   • Drug use: Never   • Sexual activity: Defer       Family History:     Family History   Problem Relation Age of Onset   • Diabetes Mother    • Heart disease Father    • Malig Hyperthermia Neg Hx        Home Medications (reported)  Current Outpatient Medications   Medication Instructions   • ARIPiprazole (ABILIFY) 2 mg, Oral, Daily   • atorvastatin (LIPITOR) 20 mg, Oral, Daily   • Fetzima 120 mg, Oral, Daily   • lisinopril (PRINIVIL,ZESTRIL) 30 mg, Oral, Daily   • traZODone (DESYREL) 100 mg, Oral, Nightly       Allergies:  Allergies   Allergen Reactions   • Bupropion Seizure       REVIEW OF SYSTEMS: All other systems reviewed and are negative.   • GEN: No fevers. No chills. No weight gain. No weight loss.  + Insomnia.   • HEENT:   No dysphagia/odynophagia. No visual disturbance.    • GI:    No N/V.  No abd pain. No diarrhea. No constipation.  No bloody/black tarry stools. No hematemesis.   • CV: No chest discomfort.  No palps. No lightheadedness. No syncope. No orthopnea/PND. No edema.   • RESP:    No cough. No wheeze.  No increased sputum. No hemoptysis. No REGALADO.  • :   No dysuria or suprapubic  discomfort. No frequency.No urgency. No hesitancy. No incontinence. No hematuria. No flank pain.    • MS:   + R hip joint stiffness and pain. No myalgias. No muscle weakness.    • SKIN:   No painful or pruritic rashes.  No skin discoloration.  • NEURO:  No focal numbness or weakness.  No headaches.  No ataxia. No slurred speech. No receptive/expressive aphasia.      • PSYCH:   No anxiety. + depression.  • ENDO:  No tremor, hair loss, heat or cold intolerance.    Objective   Objective   Vitals:   Temp:  [97.4 °F (36.3 °C)-98.6 °F (37 °C)] 97.5 °F (36.4 °C)  Heart Rate:  [] 97  Resp:  [16-20] 17  BP: (134-165)/(72-96) 149/89  Flow (L/min):  [2-3] 2  FiO2 (%):  [100 %] 100 %  PHYSICAL EXAM   • CON: WN. WD. NAD.   • EYES:  Sclera anicteric. EOMI. Normal conjunctiva.   • ENT:  Oral mucosa normal without ulcers or thrush.    • NECK:  No thyromegaly. No stridor. Trachea midline.  • RESP:  CTA. No wheezes. No crackles.  No work of breathing or tachypnea.   • CV:  Rhythm regular. Rate WNL. No murmur noted.  No edema.  • GI:  Soft and nontender. Nondistended.  Bowel sounds present.   • EXT: R hip dressing intact.  Distally, RLE intact neurovascularly  • LYMPH:  No lymphedema noted.  No cervical lymphadenopathy.  • PSYCH:  Alert. Oriented. Normal affect and mood.  • NEURO:  CNII-XII grossly intact. No dysarthria or aphasia. No unilateral weakness or paresthesia.  • SKIN: No chronic venous stasis changes or varicosities.  No cellulitis    Result Review    Result Review:  I have personally reviewed the results from the time of this admission to 10/20/2022 13:34 EDT and agree with these findings:  [x]  Laboratory  []  Microbiology  [x]  Radiology  []  EKG/Telemetry   []  Cardiology/Vascular   []  Pathology  [x]  Old records  []  Other:    Assessment & Plan   Assessment / Plan   Assessment:  • Medical management   • CHARLY (does not have home CPAP)  • HTN  • Dyslipidemia  • Insomnia  • Tobacco use   • Hx of avascular necrosis,  hips  • Hx of substance abuse, recovered  • S/P R total hip arthroplasty 10/20/22 by Dr. Mendez     Plan:  • Continue end tidal CO2 monitoring post operatively, in light of his hx of CHARLY  • Continuous oximetry, keep sats > 92%.    • Continue home meds  • Monitor for any oversedation, discussed with RN.  • Check BMP and CBC in the a.m., in light of recent surgery  • Pt asking for Habitrol patch, which will be ordered  • Oral and IV analgesics  • PT and OT ..... patient planning on home health physical therapy  • DVT prophylaxis starting on POD#1, per orthopedic surgeon  • Discussed plan with RN    DVT prophylaxis:  Mechanical DVT prophylaxis orders are present.  CODE STATUS:         Electronically signed by CT Granados, 10/20/22, 1:34 PM EDT.

## 2022-10-20 NOTE — ANESTHESIA POSTPROCEDURE EVALUATION
Patient: Vasiliy Van    Procedure Summary     Date: 10/20/22 Room / Location: [facility] OR 03 / [facility] MAIN OR    Anesthesia Start: 0708 Anesthesia Stop: 0933    Procedure: RIGHT TOTAL HIP ANTERIOR ARTHOPLASTY (Right: Hip) Diagnosis:       Osteoarthritis of right hip, unspecified osteoarthritis type      AVN (avascular necrosis of bone) (HCC)      (Osteoarthritis of right hip, unspecified osteoarthritis type [M16.11])      (AVN (avascular necrosis of bone) (HCC) [M87.00])    Surgeons: Shantanu Mendez MD Provider: Guillermo Logan MD    Anesthesia Type: general ASA Status: 3          Anesthesia Type: general    Vitals  Vitals Value Taken Time   /91 10/20/22 1106   Temp 37 °C (98.6 °F) 10/20/22 1105   Pulse 96 10/20/22 1107   Resp 16 10/20/22 1105   SpO2 100 % 10/20/22 1107   Vitals shown include unvalidated device data.        Post Anesthesia Care and Evaluation    Patient location during evaluation: bedside  Patient participation: complete - patient participated  Level of consciousness: awake, responsive to verbal stimuli, responsive to light touch, responsive to noxious stimuli, responsive to painful stimuli and responsive to physical stimuli  Pain score: 3  Pain management: adequate    Airway patency: patent  Anesthetic complications: No anesthetic complications  PONV Status: none  Cardiovascular status: acceptable and stable  Respiratory status: acceptable and nasal cannula  Hydration status: acceptable    Comments: An Anesthesiologist personally participated in the most demanding procedures (including induction and emergence if applicable) in the anesthesia plan, monitored the course of anesthesia administration at frequent intervals and remained physically present and available for immediate diagnosis and treatment of emergencies.        Pt has had some breakthrough pain that was relieved with dilaudid.  Much better control and then transferred to floor.

## 2022-10-20 NOTE — ANESTHESIA PREPROCEDURE EVALUATION
Anesthesia Evaluation     Patient summary reviewed and Nursing notes reviewed                Airway   Mallampati: I  TM distance: >3 FB  Neck ROM: full  No difficulty expected  Dental      Pulmonary - normal exam    breath sounds clear to auscultation  (+) sleep apnea,   Cardiovascular - normal exam    Rhythm: regular  Rate: normal    (+) hypertension, hyperlipidemia,       Neuro/Psych  (+) seizures, headaches,    GI/Hepatic/Renal/Endo    (+)  GERD,      Musculoskeletal     Abdominal    Substance History - negative use     OB/GYN negative ob/gyn ROS         Other   arthritis,                      Anesthesia Plan    ASA 3     general and ERAS Protocol     Reason for not using neuraxial anesthesia or peripheral nerve block: Patient Preference  intravenous induction     Anesthetic plan, risks, benefits, and alternatives have been provided, discussed and informed consent has been obtained with: patient.        CODE STATUS:

## 2022-10-21 VITALS
HEIGHT: 74 IN | TEMPERATURE: 99.1 F | DIASTOLIC BLOOD PRESSURE: 88 MMHG | WEIGHT: 216.93 LBS | RESPIRATION RATE: 18 BRPM | SYSTOLIC BLOOD PRESSURE: 133 MMHG | OXYGEN SATURATION: 100 % | HEART RATE: 100 BPM | BODY MASS INDEX: 27.84 KG/M2

## 2022-10-21 LAB
ANION GAP SERPL CALCULATED.3IONS-SCNC: 6.1 MMOL/L (ref 5–15)
BASOPHILS # BLD AUTO: 0.05 10*3/MM3 (ref 0–0.2)
BASOPHILS NFR BLD AUTO: 0.4 % (ref 0–1.5)
BUN SERPL-MCNC: 12 MG/DL (ref 6–20)
BUN/CREAT SERPL: 12.6 (ref 7–25)
CALCIUM SPEC-SCNC: 8.6 MG/DL (ref 8.6–10.5)
CHLORIDE SERPL-SCNC: 100 MMOL/L (ref 98–107)
CO2 SERPL-SCNC: 28.9 MMOL/L (ref 22–29)
CREAT SERPL-MCNC: 0.95 MG/DL (ref 0.76–1.27)
DEPRECATED RDW RBC AUTO: 43.4 FL (ref 37–54)
EGFRCR SERPLBLD CKD-EPI 2021: 102.5 ML/MIN/1.73
EOSINOPHIL # BLD AUTO: 0.09 10*3/MM3 (ref 0–0.4)
EOSINOPHIL NFR BLD AUTO: 0.6 % (ref 0.3–6.2)
ERYTHROCYTE [DISTWIDTH] IN BLOOD BY AUTOMATED COUNT: 12.5 % (ref 12.3–15.4)
GLUCOSE SERPL-MCNC: 123 MG/DL (ref 65–99)
HCT VFR BLD AUTO: 28.8 % (ref 37.5–51)
HGB BLD-MCNC: 9.9 G/DL (ref 13–17.7)
IMM GRANULOCYTES # BLD AUTO: 0.06 10*3/MM3 (ref 0–0.05)
IMM GRANULOCYTES NFR BLD AUTO: 0.4 % (ref 0–0.5)
LYMPHOCYTES # BLD AUTO: 3.17 10*3/MM3 (ref 0.7–3.1)
LYMPHOCYTES NFR BLD AUTO: 22.7 % (ref 19.6–45.3)
MAGNESIUM SERPL-MCNC: 2.2 MG/DL (ref 1.6–2.6)
MCH RBC QN AUTO: 32.5 PG (ref 26.6–33)
MCHC RBC AUTO-ENTMCNC: 34.4 G/DL (ref 31.5–35.7)
MCV RBC AUTO: 94.4 FL (ref 79–97)
MONOCYTES # BLD AUTO: 1.3 10*3/MM3 (ref 0.1–0.9)
MONOCYTES NFR BLD AUTO: 9.3 % (ref 5–12)
NEUTROPHILS NFR BLD AUTO: 66.6 % (ref 42.7–76)
NEUTROPHILS NFR BLD AUTO: 9.3 10*3/MM3 (ref 1.7–7)
NRBC BLD AUTO-RTO: 0 /100 WBC (ref 0–0.2)
PLATELET # BLD AUTO: 189 10*3/MM3 (ref 140–450)
PMV BLD AUTO: 10.5 FL (ref 6–12)
POTASSIUM SERPL-SCNC: 4.3 MMOL/L (ref 3.5–5.2)
RBC # BLD AUTO: 3.05 10*6/MM3 (ref 4.14–5.8)
SODIUM SERPL-SCNC: 135 MMOL/L (ref 136–145)
WBC NRBC COR # BLD: 13.97 10*3/MM3 (ref 3.4–10.8)

## 2022-10-21 PROCEDURE — 83735 ASSAY OF MAGNESIUM: CPT | Performed by: PHYSICIAN ASSISTANT

## 2022-10-21 PROCEDURE — 97165 OT EVAL LOW COMPLEX 30 MIN: CPT

## 2022-10-21 PROCEDURE — 97535 SELF CARE MNGMENT TRAINING: CPT

## 2022-10-21 PROCEDURE — 94799 UNLISTED PULMONARY SVC/PX: CPT

## 2022-10-21 PROCEDURE — 97116 GAIT TRAINING THERAPY: CPT

## 2022-10-21 PROCEDURE — 99213 OFFICE O/P EST LOW 20 MIN: CPT | Performed by: PHYSICIAN ASSISTANT

## 2022-10-21 PROCEDURE — 85025 COMPLETE CBC W/AUTO DIFF WBC: CPT | Performed by: PHYSICIAN ASSISTANT

## 2022-10-21 PROCEDURE — 99024 POSTOP FOLLOW-UP VISIT: CPT | Performed by: STUDENT IN AN ORGANIZED HEALTH CARE EDUCATION/TRAINING PROGRAM

## 2022-10-21 PROCEDURE — 25010000002 KETOROLAC TROMETHAMINE PER 15 MG: Performed by: STUDENT IN AN ORGANIZED HEALTH CARE EDUCATION/TRAINING PROGRAM

## 2022-10-21 PROCEDURE — 80048 BASIC METABOLIC PNL TOTAL CA: CPT | Performed by: STUDENT IN AN ORGANIZED HEALTH CARE EDUCATION/TRAINING PROGRAM

## 2022-10-21 PROCEDURE — 97150 GROUP THERAPEUTIC PROCEDURES: CPT

## 2022-10-21 RX ORDER — OXYCODONE AND ACETAMINOPHEN 10; 325 MG/1; MG/1
1 TABLET ORAL EVERY 4 HOURS PRN
Qty: 40 TABLET | Refills: 0 | Status: SHIPPED | OUTPATIENT
Start: 2022-10-21 | End: 2022-10-25 | Stop reason: SDUPTHER

## 2022-10-21 RX ORDER — AMOXICILLIN 250 MG
2 CAPSULE ORAL 2 TIMES DAILY
Qty: 20 TABLET | Refills: 0 | Status: SHIPPED | OUTPATIENT
Start: 2022-10-21

## 2022-10-21 RX ORDER — ASPIRIN 325 MG
325 TABLET, DELAYED RELEASE (ENTERIC COATED) ORAL EVERY 12 HOURS SCHEDULED
Qty: 60 TABLET | Refills: 0 | Status: SHIPPED | OUTPATIENT
Start: 2022-10-21 | End: 2022-11-20

## 2022-10-21 RX ADMIN — OXYCODONE HYDROCHLORIDE AND ACETAMINOPHEN 1 TABLET: 10; 325 TABLET ORAL at 06:11

## 2022-10-21 RX ADMIN — OXYCODONE HYDROCHLORIDE AND ACETAMINOPHEN 1 TABLET: 10; 325 TABLET ORAL at 02:16

## 2022-10-21 RX ADMIN — ARIPIPRAZOLE 2 MG: 2 TABLET ORAL at 08:54

## 2022-10-21 RX ADMIN — FERROUS SULFATE TAB 325 MG (65 MG ELEMENTAL FE) 325 MG: 325 (65 FE) TAB at 08:54

## 2022-10-21 RX ADMIN — KETOROLAC TROMETHAMINE 15 MG: 30 INJECTION, SOLUTION INTRAMUSCULAR; INTRAVENOUS at 02:05

## 2022-10-21 RX ADMIN — FAMOTIDINE 40 MG: 20 TABLET ORAL at 08:54

## 2022-10-21 RX ADMIN — OXYCODONE HYDROCHLORIDE AND ACETAMINOPHEN 1 TABLET: 10; 325 TABLET ORAL at 10:11

## 2022-10-21 RX ADMIN — ASPIRIN 325 MG: 325 TABLET, COATED ORAL at 08:54

## 2022-10-21 RX ADMIN — KETOROLAC TROMETHAMINE 15 MG: 30 INJECTION, SOLUTION INTRAMUSCULAR; INTRAVENOUS at 08:55

## 2022-10-21 RX ADMIN — Medication 1 PATCH: at 08:55

## 2022-10-21 RX ADMIN — LISINOPRIL 30 MG: 20 TABLET ORAL at 08:55

## 2022-10-21 NOTE — THERAPY EVALUATION
Patient Name: Vasiliy Van  : 1980    MRN: [id number]                              Today's Date: 10/21/2022       Admit Date: 10/20/2022    Visit Dx:     ICD-10-CM ICD-9-CM   1. Difficulty in walking  R26.2 719.7   2. AVN (avascular necrosis of bone) (HCC)  M87.00 733.40   3. Osteoarthritis of right hip, unspecified osteoarthritis type  M16.11 715.95   4. Decreased activities of daily living (ADL)  Z78.9 V49.89     Patient Active Problem List   Diagnosis   • Nausea with vomiting   • GERD without esophagitis   • Right hip pain   • AVN (avascular necrosis of bone) (HCC)   • Osteoarthritis of right hip   • Arthritis of right hip     Past Medical History:   Diagnosis Date   • Arthritis    • Avascular necrosis (HCC)     RIGHT HIP   • GERD (gastroesophageal reflux disease)    • High blood pressure    • High cholesterol    • Insomnia    • Seizure (HCC)     X1 FELT D/T BUPROPION DENIES ANY FURTHER ISSUES   • Sleep apnea     NO CPAP   • Spinal headache      Past Surgical History:   Procedure Laterality Date   • ENDOSCOPY N/A 2021    Procedure: ESOPHAGOGASTRODUODENOSCOPY with biopsy;  Surgeon: Satish Mejia MD;  Location: [facility] ENDOSCOPY;  Service: General;  Laterality: N/A;  SLIDING HIATAL HERNIA   • HIP SURGERY Left 2020    Total hip replacement   • JOINT REPLACEMENT      TOTAL HIP LEFT SIDE   • LUMBAR LAMINECTOMY     • TOTAL HIP ARTHROPLASTY Right 10/20/2022    Procedure: RIGHT TOTAL HIP ANTERIOR ARTHOPLASTY;  Surgeon: Shantanu Mendez MD;  Location: [facility] MAIN OR;  Service: Orthopedics;  Laterality: Right;      General Information     Row Name 10/21/22 1057          OT Time and Intention    Document Type evaluation  -AV     Mode of Treatment individual therapy;occupational therapy  -AV     Row Name 10/21/22 1057          General Information    Patient Profile Reviewed yes  -AV     Prior Level of Function independent:;ADL's;home management;all household mobility  Stands to shower (tub).   Stands at sink to groom.  Uses regular commode.  Ambulates without assistive device.  No home oxygen.  -AV     Existing Precautions/Restrictions fall  WBAT  -AV     Barriers to Rehab none identified  -AV     Row Name 10/21/22 1057          Occupational Profile    Reason for Services/Referral (Occupational Profile) Patient is a 42year old male admitted to [facility] on October 20, 2022 with right hip pain.  He is currently postop day 1: Total hip replacement with anterior approach.  He is on 2 N./room air.   OT consulted due to recent decline in ADL/transfer independence.  No previous OT services for current condition.  -AV     Row Name 10/21/22 1057          Living Environment    People in Home alone  Significant other will be staying with him postoperatively  -AV     Row Name 10/21/22 1057          Home Main Entrance    Number of Stairs, Main Entrance five  -AV     Row Name 10/21/22 1057          Stairs Within Home, Primary    Number of Stairs, Within Home, Primary none  -AV     Row Name 10/21/22 1057          Cognition    Orientation Status (Cognition) --  Patient is alert, pleasant and cooperative- able to retain information and follow commands.  -AV     Row Name 10/21/22 1057          Safety Issues, Functional Mobility    Impairments Affecting Function (Mobility) balance;endurance/activity tolerance  -AV           User Key  (r) = Recorded By, (t) = Taken By, (c) = Cosigned By    Initials Name Provider Type    AV Monster Ward, OT Occupational Therapist                 Mobility/ADL's     Row Name 10/21/22 1055          Sit-Stand Transfer    Sit-Stand [place] (Transfers) standby assist  -AV     Assistive Device (Sit-Stand Transfers) walker, front-wheeled  -AV     Row Name 10/21/22 1054          Activities of Daily Living    BADL Assessment/Intervention --  Independent feeding, grooming and UB bathing/dressing with set up while seated.  Min assist LB bathing/dressing.  CGA toilet hygiene.  -AV            User Key  (r) = Recorded By, (t) = Taken By, (c) = Cosigned By    Initials Name Provider Type    Monster Dao OT Occupational Therapist               Obj/Interventions     Row Name 10/21/22 1100          Sensory Assessment (Somatosensory)    Sensory Assessment (Somatosensory) UE sensation intact  -AV     Row Name 10/21/22 1100          Vision Assessment/Intervention    Visual Impairment/Limitations WFL  -AV     Row Name 10/21/22 1100          Range of Motion Comprehensive    General Range of Motion bilateral upper extremity ROM WFL  AROM  -AV     Row Name 10/21/22 1100          Strength Comprehensive (MMT)    Comment, General Manual Muscle Testing (MMT) Assessment 5/5 bilateral upper extremities  -AV     Row Name 10/21/22 1100          Motor Skills    Motor Skills coordination;functional endurance  -AV     Coordination WFL  Right dominant  -AV     Functional Endurance Fair  -AV     Row Name 10/21/22 1100          Balance    Dynamic Standing Balance standby assist  -AV     Position/Device Used, Standing Balance walker, front-wheeled  -AV           User Key  (r) = Recorded By, (t) = Taken By, (c) = Cosigned By    Initials Name Provider Type    Monster Dao OT Occupational Therapist               Goals/Plan     Row Name 10/21/22 1103          Transfer Goal 1 (OT)    Activity/Assistive Device (Transfer Goal 1, OT) transfers, all;walker, rolling  -AV     [place] Level/Cues Needed (Transfer Goal 1, OT) modified independence  -AV     Time Frame (Transfer Goal 1, OT) long term goal (LTG);10 days  -AV     Row Name 10/21/22 1103          Bathing Goal 1 (OT)    Activity/Device (Bathing Goal 1, OT) bathing skills, all;shower chair  -AV     [place] Level/Cues Needed (Bathing Goal 1, OT) modified independence  -AV     Time Frame (Bathing Goal 1, OT) long term goal (LTG);10 days  -AV     Row Name 10/21/22 1103          Dressing Goal 1 (OT)    Activity/Device (Dressing Goal 1, OT) dressing skills, all   -AV     [place]/Cues Needed (Dressing Goal 1, OT) modified independence  -AV     Time Frame (Dressing Goal 1, OT) long term goal (LTG);10 days  -AV     Row Name 10/21/22 1103          Toileting Goal 1 (OT)    Activity/Device (Toileting Goal 1, OT) toileting skills, all;raised toilet seat  -AV     [place] Level/Cues Needed (Toileting Goal 1, OT) modified independence  -AV     Time Frame (Toileting Goal 1, OT) long term goal (LTG);10 days  -AV     Row Name 10/21/22 1103          Grooming Goal 1 (OT)    Activity/Device (Grooming Goal 1, OT) grooming skills, all  -AV     [place] (Grooming Goal 1, OT) modified independence  Standing at sink  -AV     Time Frame (Grooming Goal 1, OT) long term goal (LTG);10 days  -AV     Row Name 10/21/22 1103          Problem Specific Goal 1 (OT)    Problem Specific Goal 1 (OT) Patient will demonstrate good standing endurance/activity tolerance needed to support ADLs.  -AV     Time Frame (Problem Specific Goal 1, OT) long term goal (LTG);10 days  -AV     Row Name 10/21/22 1103          Therapy Assessment/Plan (OT)    Planned Therapy Interventions (OT) activity tolerance training;BADL retraining;functional balance retraining;occupation/activity based interventions;IADL retraining;patient/caregiver education/training;transfer/mobility retraining  -AV           User Key  (r) = Recorded By, (t) = Taken By, (c) = Cosigned By    Initials Name Provider Type    AV Monster Ward, OT Occupational Therapist               Clinical Impression     Row Name 10/21/22 1102          Pain Assessment    Additional Documentation Pain Scale: FACES Pre/Post-Treatment (Group)  -AV     Row Name 10/21/22 1102          Pain Scale: FACES Pre/Post-Treatment    Pain: FACES Scale, Pretreatment 2-->hurts little bit  -AV     Posttreatment Pain Rating 2-->hurts little bit  -AV     Pain Location - Side/Orientation Right  -AV     Pain Location - hip  -AV     Row Name 10/21/22 1102          Plan of Care  Review    Plan of Care Reviewed With patient;significant other  -AV     Progress improving  -AV     Outcome Evaluation Patient presents with limitations of balance and endurance/activity tolerance which impede his ability to perform ADL and transfers as prior.  The skills of a therapist will be required to safely and effectively implement treatment plan to restore maximal level of function.  -AV     Row Name 10/21/22 1102          Therapy Assessment/Plan (OT)    Patient/Family Therapy Goal Statement (OT) Feel better  -AV     Rehab Potential (OT) good, to achieve stated therapy goals  -AV     Criteria for Skilled Therapeutic Interventions Met (OT) yes;meets criteria;skilled treatment is necessary  -AV     Therapy Frequency (OT) 5 times/wk  -AV     Row Name 10/21/22 1102          Therapy Plan Review/Discharge Plan (OT)    Equipment Needs Upon Discharge (OT) walker, rolling;shower chair  Has 3 in 1 commode available  -AV     Row Name 10/21/22 1102          Vital Signs    O2 Delivery Pre Treatment room air  -AV     O2 Delivery Intra Treatment room air  -AV     O2 Delivery Post Treatment room air  -AV           User Key  (r) = Recorded By, (t) = Taken By, (c) = Cosigned By    Initials Name Provider Type    AV Monster Ward, DANNY Occupational Therapist               Outcome Measures     Row Name 10/21/22 1103          How much help from another is currently needed...    Putting on and taking off regular lower body clothing? 3  -AV     Bathing (including washing, rinsing, and drying) 3  -AV     Toileting (which includes using toilet bed pan or urinal) 3  -AV     Putting on and taking off regular upper body clothing 4  -AV     Taking care of personal grooming (such as brushing teeth) 4  -AV     Eating meals 4  -AV     AM-PAC 6 Clicks Score (OT) 21  -AV     Row Name 10/21/22 1103          Functional Assessment    Outcome Measure Options AM-PAC 6 Clicks Daily Activity (OT);Optimal Instrument  -AV     Row Name 10/21/22 1103           Optimal Instrument    Optimal Instrument Optimal - 3  -AV     Bending/Stooping 2  -AV     Standing 2  -AV     Reaching 1  -AV     From the list, choose the 3 activities you would most like to be able to do without any difficulty Bending/stooping;Standing;Reaching  -AV     Total Score Optimal - 3 5  -AV           User Key  (r) = Recorded By, (t) = Taken By, (c) = Cosigned By    Initials Name Provider Type    Monster Dao OT Occupational Therapist                Occupational Therapy Education     Title: PT OT SLP Therapies (Done)     Topic: Occupational Therapy (Done)     Point: ADL training (Done)     Description:   Instruct learner(s) on proper safety adaptation and remediation techniques during self care or transfers.   Instruct in proper use of assistive devices.              Learning Progress Summary           Patient Acceptance, E, VU by AV at 10/21/2022 1104    Comment: WBAT  Need for staff assistance for all standing ADL/transfers  Safe transfer techniques  Safe positioning for ADLs  Adaptive techniques for lower body ADLs  ADL home safety/ adaptive equipment recommendations                   Point: Home exercise program (Done)     Description:   Instruct learner(s) on appropriate technique for monitoring, assisting and/or progressing therapeutic exercises/activities.              Learning Progress Summary           Patient Acceptance, E, VU by AV at 10/21/2022 1104    Comment: WBAT  Need for staff assistance for all standing ADL/transfers  Safe transfer techniques  Safe positioning for ADLs  Adaptive techniques for lower body ADLs  ADL home safety/ adaptive equipment recommendations                   Point: Precautions (Done)     Description:   Instruct learner(s) on prescribed precautions during self-care and functional transfers.              Learning Progress Summary           Patient Acceptance, E, VU by AV at 10/21/2022 1104    Comment: WBAT  Need for staff assistance for all standing  ADL/transfers  Safe transfer techniques  Safe positioning for ADLs  Adaptive techniques for lower body ADLs  ADL home safety/ adaptive equipment recommendations                   Point: Body mechanics (Done)     Description:   Instruct learner(s) on proper positioning and spine alignment during self-care, functional mobility activities and/or exercises.              Learning Progress Summary           Patient Acceptance, E, VU by AV at 10/21/2022 1104    Comment: WBAT  Need for staff assistance for all standing ADL/transfers  Safe transfer techniques  Safe positioning for ADLs  Adaptive techniques for lower body ADLs  ADL home safety/ adaptive equipment recommendations                               User Key     Initials Effective Dates Name Provider Type Discipline     06/16/21 -  Monster Ward OT Occupational Therapist OT              OT Recommendation and Plan  Planned Therapy Interventions (OT): activity tolerance training, BADL retraining, functional balance retraining, occupation/activity based interventions, IADL retraining, patient/caregiver education/training, transfer/mobility retraining  Therapy Frequency (OT): 5 times/wk  Plan of Care Review  Plan of Care Reviewed With: patient, significant other  Progress: improving  Outcome Evaluation: Patient presents with limitations of balance and endurance/activity tolerance which impede his ability to perform ADL and transfers as prior.  The skills of a therapist will be required to safely and effectively implement treatment plan to restore maximal level of function.     Time Calculation:    Time Calculation- OT     Row Name 10/21/22 1105             Time Calculation- OT    OT Received On 10/21/22  -AV      OT Goal Re-Cert Due Date 10/30/22  -AV         Timed Charges    68484 - OT Self Care/Mgmt Minutes 10  -AV         Untimed Charges    OT Eval/Re-eval Minutes 35  -AV         Total Minutes    Timed Charges Total Minutes 10  -AV      Untimed Charges Total  Minutes 35  -AV       Total Minutes 45  -AV            User Key  (r) = Recorded By, (t) = Taken By, (c) = Cosigned By    Initials Name Provider Type    Monster Dao OT Occupational Therapist              Therapy Charges for Today     Code Description Service Date Service Provider Modifiers Qty    80432936784  OT SELF CARE/MGMT/TRAIN EA 15 MIN 10/21/2022 Monster Ward OT GO 1    [id number]  OT EVAL LOW COMPLEXITY 3 10/21/2022 Monster Ward OT GO 1               Monster Ward OT  10/21/2022

## 2022-10-21 NOTE — PLAN OF CARE
Goal Outcome Evaluation:         Pt stable throughout the shift, vitals have been WNL. Pain well controlled with prn pain medications. Pt voiding without difficulty. Pt has been ambulating well with 1x assistance and walker. PT/OT has cleared pt. Pt has walker at bedside and stated he has a 3 in 1 commode at home already.

## 2022-10-21 NOTE — PROGRESS NOTES
[facility]     Progress Note    Patient Name: Vasiliy Van  : 1980  MRN: [id number]  Primary Care Physician:  Lynn Siddiqi APRN  Date of admission: 10/20/2022    Subjective   Subjective     HPI:  Painful but improving postoperatively.  Up and ambulating with his walker.  Voiding without difficulty.  Denies chest pains or shortness of breath.    Review of Systems   See HPI    Objective   Objective     Vitals:   Temp:  [97.4 °F (36.3 °C)-99 °F (37.2 °C)] 99 °F (37.2 °C)  Heart Rate:  [] 90  Resp:  [12-18] 18  BP: (123-165)/(72-96) 137/76  Flow (L/min):  [2-3] 2  Physical Exam    General: Alert, no acute distress   Right lower extremity: Aquacel dressing is clean and dry.  No drainage noted.  Mild/expected surrounding bruising.  Minimal swelling.  Calf is soft and nontender.  Leg lengths symmetric.  Sensation intact over the dorsal and plantar foot.  Demonstrates active ankle plantarflexion and dorsiflexion.  Palpable pedal pulse.    Result Review      WBC   Date Value Ref Range Status   10/21/2022 13.97 (H) 3.40 - 10.80 10*3/mm3 Final     RBC   Date Value Ref Range Status   10/21/2022 3.05 (L) 4.14 - 5.80 10*6/mm3 Final     Hemoglobin   Date Value Ref Range Status   10/21/2022 9.9 (L) 13.0 - 17.7 g/dL Final     Hematocrit   Date Value Ref Range Status   10/21/2022 28.8 (L) 37.5 - 51.0 % Final     MCV   Date Value Ref Range Status   10/21/2022 94.4 79.0 - 97.0 fL Final     MCH   Date Value Ref Range Status   10/21/2022 32.5 26.6 - 33.0 pg Final     MCHC   Date Value Ref Range Status   10/21/2022 34.4 31.5 - 35.7 g/dL Final     RDW   Date Value Ref Range Status   10/21/2022 12.5 12.3 - 15.4 % Final     RDW-SD   Date Value Ref Range Status   10/21/2022 43.4 37.0 - 54.0 fl Final     MPV   Date Value Ref Range Status   10/21/2022 10.5 6.0 - 12.0 fL Final     Platelets   Date Value Ref Range Status   10/21/2022 189 140 - 450 10*3/mm3 Final     Neutrophil %   Date Value Ref Range Status    10/21/2022 66.6 42.7 - 76.0 % Final     Lymphocyte %   Date Value Ref Range Status   10/21/2022 22.7 19.6 - 45.3 % Final     Monocyte %   Date Value Ref Range Status   10/21/2022 9.3 5.0 - 12.0 % Final     Eosinophil %   Date Value Ref Range Status   10/21/2022 0.6 0.3 - 6.2 % Final     Basophil %   Date Value Ref Range Status   10/21/2022 0.4 0.0 - 1.5 % Final     Immature Grans %   Date Value Ref Range Status   10/21/2022 0.4 0.0 - 0.5 % Final     Neutrophils, Absolute   Date Value Ref Range Status   10/21/2022 9.30 (H) 1.70 - 7.00 10*3/mm3 Final     Lymphocytes, Absolute   Date Value Ref Range Status   10/21/2022 3.17 (H) 0.70 - 3.10 10*3/mm3 Final     Monocytes, Absolute   Date Value Ref Range Status   10/21/2022 1.30 (H) 0.10 - 0.90 10*3/mm3 Final     Eosinophils, Absolute   Date Value Ref Range Status   10/21/2022 0.09 0.00 - 0.40 10*3/mm3 Final     Basophils, Absolute   Date Value Ref Range Status   10/21/2022 0.05 0.00 - 0.20 10*3/mm3 Final     Immature Grans, Absolute   Date Value Ref Range Status   10/21/2022 0.06 (H) 0.00 - 0.05 10*3/mm3 Final     nRBC   Date Value Ref Range Status   10/21/2022 0.0 0.0 - 0.2 /100 WBC Final        Result Review:  I have personally reviewed the results from the time of this admission to 10/21/2022 07:33 EDT and agree with these findings:  []  Laboratory  []  Microbiology  []  Radiology  []  EKG/Telemetry   []  Cardiology/Vascular   []  Pathology  []  Old records  []  Other:      Assessment & Plan   Assessment / Plan     Assessment: 42-year-old male postop day 1 right total hip arthroplasty for avascular necrosis    Active Hospital Problems:  Active Hospital Problems    Diagnosis    • **Arthritis of right hip    • Osteoarthritis of right hip    • AVN (avascular necrosis of bone) ([facility])      Plan:  XR reviewed - no fractures or hardware complications  Pain control  Post operative ABX: ancef  DVT ppx: Aspirin 325 mg twice daily, SCD, Mobilize  WBAT right lower  extremity  Anterior precautions  PT/OT  Further care per primary team, appreciate assistance        Dispo: Stable for discharge from orthopedic standpoint.  2-week follow-up for reevaluation.    DVT prophylaxis:  Mechanical DVT prophylaxis orders are present.    CODE STATUS:          Electronically signed by Shantanu Mendez MD, 10/21/22, 7:33 AM EDT.

## 2022-10-21 NOTE — THERAPY TREATMENT NOTE
Acute Care - Physical Therapy Treatment Note   Al     Patient Name: Vasiliy Van  : 1980  MRN: [id number]  Today's Date: 10/21/2022 Gait- individual; ther- ex -group setting; 4 participants         Visit Dx:     ICD-10-CM ICD-9-CM   1. Difficulty in walking  R26.2 719.7   2. AVN (avascular necrosis of bone) (Formerly [facility])  M87.00 733.40   3. Osteoarthritis of right hip, unspecified osteoarthritis type  M16.11 715.95   4. Decreased activities of daily living (ADL)  Z78.9 V49.89     Patient Active Problem List   Diagnosis   • Nausea with vomiting   • GERD without esophagitis   • Right hip pain   • AVN (avascular necrosis of bone) (Formerly [facility])   • Osteoarthritis of right hip   • Arthritis of right hip     Past Medical History:   Diagnosis Date   • Arthritis    • Avascular necrosis (HCC)     RIGHT HIP   • GERD (gastroesophageal reflux disease)    • High blood pressure    • High cholesterol    • Insomnia    • Seizure (HCC)     X1 FELT D/T BUPROPION DENIES ANY FURTHER ISSUES   • Sleep apnea     NO CPAP   • Spinal headache      Past Surgical History:   Procedure Laterality Date   • ENDOSCOPY N/A 2021    Procedure: ESOPHAGOGASTRODUODENOSCOPY with biopsy;  Surgeon: Satish Mejia MD;  Location: [facility] ENDOSCOPY;  Service: General;  Laterality: N/A;  SLIDING HIATAL HERNIA   • HIP SURGERY Left 2020    Total hip replacement   • JOINT REPLACEMENT      TOTAL HIP LEFT SIDE   • LUMBAR LAMINECTOMY     • TOTAL HIP ARTHROPLASTY Right 10/20/2022    Procedure: RIGHT TOTAL HIP ANTERIOR ARTHOPLASTY;  Surgeon: Shantanu Mendez MD;  Location: [facility] MAIN OR;  Service: Orthopedics;  Laterality: Right;     PT Assessment (last 12 hours)     PT Evaluation and Treatment     Row Name 10/21/22 1226          Physical Therapy Time and Intention    Subjective Information no complaints  -RH     Document Type therapy note (daily note)  -RH     Mode of Treatment physical therapy;group therapy;individual therapy  -RH     Patient Effort  good  -     Row Name 10/21/22 1226          Pain Scale: FACES Pre/Post-Treatment    Pain: FACES Scale, Pretreatment 0-->no hurt  -     Posttreatment Pain Rating 0-->no hurt  -     Row Name 10/21/22 1226          Range of Motion (ROM)    Range of Motion --  Pt R hip AAROM at 95 degrees flex and 25 degrees abd.  -[facility] Name 10/21/22 1226          Strength (Manual Muscle Testing)    Strength (Manual Muscle Testing) --  Pt R hip flex strength at 3-/5.  -     Row Name 10/21/22 1226          Transfers    Transfers sit-stand transfer;stand-sit transfer  -[facility] Name 10/21/22 1226          Sit-Stand Transfer    Sit-Stand Butts (Transfers) contact guard  -     Assistive Device (Sit-Stand Transfers) walker, front-wheeled  -[facility] Name 10/21/22 1226          Stand-Sit Transfer    Stand-Sit Butts (Transfers) contact guard  -     Assistive Device (Stand-Sit Transfers) walker, front-wheeled  -RH     Row Name 10/21/22 1226          Gait/Stairs (Locomotion)    Gait/Stairs Locomotion gait/ambulation independence;gait/ambulation assistive device;distance ambulated;gait pattern;gait deviations  -     Butts Level (Gait) standby assist  -     Assistive Device (Gait) walker, front-wheeled  -     Distance in Feet (Gait) 65  -RH     Pattern (Gait) 3-point;step-through  -RH     Deviations/Abnormal Patterns (Gait) base of support, narrow;gait speed decreased;stride length decreased  -     Left Sided Gait Deviations heel strike decreased  -     Negotiation (Stairs) stairs independence;stairs assistive device;handrail location;number of steps;ascending technique;descending technique  -RH     Butts Level (Stairs) contact guard  -     Handrail Location (Stairs) both sides  -RH     Number of Steps (Stairs) 5 x 2  -RH     Ascending Technique (Stairs) step-to-step  -RH     Descending Technique (Stairs) step-to-step  -     Row Name 10/21/22 1226          Balance    Dynamic Standing  Balance standby assist  -RH     Position/Device Used, Standing Balance walker, front-wheeled  -RH     Row Name             Wound 10/20/22 0830 Right anterior hip Incision    Wound - Properties Group Placement Date: 10/20/22  -SC Placement Time: 0830  -SC Present on Hospital Admission: N  -SC Side: Right  -SC Orientation: anterior  -SC Location: hip  -SC Primary Wound Type: Incision  -SC    Retired Wound - Properties Group Placement Date: 10/20/22  -SC Placement Time: 0830  -SC Present on Hospital Admission: N  -SC Side: Right  -SC Orientation: anterior  -SC Location: hip  -SC Primary Wound Type: Incision  -SC    Retired Wound - Properties Group Date first assessed: 10/20/22  -SC Time first assessed: 0830  -SC Present on Hospital Admission: N  -SC Side: Right  -SC Location: hip  -SC Primary Wound Type: Incision  -SC    Row Name 10/21/22 1226          Vital Signs    O2 Delivery Intra Treatment room air  -     Row Name 10/21/22 1226          Progress Summary (PT)    Progress Toward Functional Goals (PT) progress toward functional goals is good  -RH           User Key  (r) = Recorded By, (t) = Taken By, (c) = Cosigned By    Initials Name Provider Type    SC Mansi Fontaine, RN Registered Nurse    RH Eros Smith PTA Physical Therapist Assistant               Right Hip Ther -ex   Exercise  Reps  Sets    Long arc quads   10 2   Short arc quads  10 2   Heel slides  10 2   Ankle pumps  10 2   Quad sets  10 2   Glut sets  10 2   Abduction/ Adduction  10 2        Physical Therapy Education     Title: PT OT SLP Therapies (Resolved)     Topic: Physical Therapy (Resolved)     Point: Mobility training (Resolved)     Learning Progress Summary           Patient Acceptance, E, VU by AYALA at 10/21/2022 1104    Comment: WBAT  Need for staff assistance for all standing ADL/transfers  Safe transfer techniques  Safe positioning for ADLs  Adaptive techniques for lower body ADLs  ADL home safety/ adaptive equipment recommendations     DANIEL Wall VU by TUAN at 10/20/2022 1132                               User Key     Initials Effective Dates Name Provider Type Discipline    AV 06/16/21 -  Monster Ward OT Occupational Therapist OT    TUAN 06/03/21 -  Maico Lindsay, PT Physical Therapist PT              PT Recommendation and Plan     Progress Summary (PT)  Progress Toward Functional Goals (PT): progress toward functional goals is good   Outcome Measures     Row Name 10/21/22 1200 10/20/22 1100          How much help from another person do you currently need...    Turning from your back to your side while in flat bed without using bedrails? 4  -RH 3  -TUAN     Moving from lying on back to sitting on the side of a flat bed without bedrails? 4  -RH 3  -TUAN     Moving to and from a bed to a chair (including a wheelchair)? 4  -RH 3  -TUAN     Standing up from a chair using your arms (e.g., wheelchair, bedside chair)? 4  -RH 3  -TUAN     Climbing 3-5 steps with a railing? 4  -RH 2  -TUAN     To walk in hospital room? 4  -RH 3  -TUAN     AM-PAC 6 Clicks Score (PT) 24  -RH 17  -TUAN        Functional Assessment    Outcome Measure Options -- AM-PAC 6 Clicks Basic Mobility (PT)  -TUNA           User Key  (r) = Recorded By, (t) = Taken By, (c) = Cosigned By    Initials Name Provider Type    RH Eros Smith PTA Physical Therapist Assistant    Maico Quiroga, PT Physical Therapist                 Time Calculation:    PT Charges     Row Name 10/21/22 1225             Time Calculation    PT Received On 10/21/22  -RH         Timed Charges    09399 - Gait Training Minutes  7  -RH      96538 - PT Therapeutic Activity Minutes 4  -RH         Untimed Charges    PT Group Therapy Minutes 25  -RH         Total Minutes    Timed Charges Total Minutes 11  -RH      Untimed Charges Total Minutes 25  -RH       Total Minutes 36  -RH            User Key  (r) = Recorded By, (t) = Taken By, (c) = Cosigned By    Initials Name Provider Type    RH Eros Smith PTA Physical Therapist  Assistant              Therapy Charges for Today     Code Description Service Date Service Provider Modifiers Qty    [id number]  GAIT TRAINING EA 15 MIN 10/21/2022 Eros Smith, KIARA GP 1    [id number] HC PT THER PROC GROUP 10/21/2022 Eros Smith PTA GP 1          PT G-Codes  Outcome Measure Options: AM-PAC 6 Clicks Daily Activity (OT), Optimal Instrument  AM-PAC 6 Clicks Score (PT): 24  AM-PAC 6 Clicks Score (OT): 21    Eros Smith PTA  10/21/2022

## 2022-10-21 NOTE — PLAN OF CARE
Goal Outcome Evaluation:  Plan of Care Reviewed With: patient, significant other        Progress: improving  Outcome Evaluation: Patient presents with limitations of balance and endurance/activity tolerance which impede his ability to perform ADL and transfers as prior.  The skills of a therapist will be required to safely and effectively implement treatment plan to restore maximal level of function.    Discharge recommendation: Outpatient PT

## 2022-10-21 NOTE — DISCHARGE INSTRUCTIONS
Orthopedic instructions: You may bear weight as tolerated with your walker.  Follow the anterior hip precautions as instructed by the physical therapy team.  Follow the dressing change instructions provided by the nursing staff.  Monitor for increasing redness, drainage, fevers, chills.  Take aspirin 325 mg twice daily for DVT prophylaxis.  Call the office with any concerns.  2-week orthopedic follow-up for reevaluation.    Leave current dressing (Aquacel) in place until 10/23  , remove and clean with alcohol swabs and replace Aqaucel. Leave in place additional 3 days until 10/26 . Then begin daily dry dressing changes as instructed at discharge until follow up with Ortho MD.     Please see handout provided at discharge for additional instructions.       Additionally, take all medications as prescribed. Complete Eliquis completely before beginning Aspirin.

## 2022-10-21 NOTE — PROGRESS NOTES
[facility]   Hospitalist Progress Note       Patient Name: Vasiliy Van  : 1980  MRN: [id number]  Primary Care Physician: Lynn Siddiqi APRN  Date of admission: 10/20/2022  Today's Date: 10/21/2022  Room / Bed:   Pending sale to [facility]/  Subjective   Chief Complaint: Medical management     HPI:  Mr. Vasiliy Van is a 42 y.o. male who is being seen by hospitalist today for general medical management of chronic medical conditions.  He has a history of avascular necrosis / hips as well as HTN, GERD, Depression, Insomnia, Substance abuse disorder, Tobacco use, Dyslipidemia, CHARLY (no CPAP).  He does not have a CPAP machine.  He had R total hip arthroplasty by Dr. Mendez, 10/20/22.  Currently, he is alert and conversational.  He denies any post op nausea.  His R hip aches but is to be expected from surgery earlier.  Vitals are stable.  On room air.  He is normotensive.  Recent lab work reviewed.  He took all his home meds this morning prior to surgery.   He is currently on:         Current Outpatient Medications   Medication Instructions   • ARIPiprazole (ABILIFY) 2 mg, Oral, Daily   • atorvastatin (LIPITOR) 20 mg, Oral, Daily   • Fetzima 120 mg, Oral, Daily   • lisinopril (PRINIVIL,ZESTRIL) 30 mg, Oral, Daily   • traZODone (DESYREL) 100 mg, Oral, Nightly        Interval Followup: 10/21/2022    • Post op pain better controlled today  • Patient doing well with no post op issues.  Vitals stable.  Sats 100% on room air.  • Normotensive  • Patient eager to return home today  • OK for home per ortho.  Home health arranged.      REVIEW OF SYSTEMS: All other systems reviewed and are negative.   • GEN:   No fevers. No chills. No weight gain. No weight loss.  + Insomnia.   • HEENT:           No dysphagia/odynophagia. No visual disturbance.    • GI:                   No N/V.  No abd pain. No diarrhea. No constipation.  No bloody/black tarry stools. No hematemesis.   • CV:      No chest discomfort.  No palps. No  lightheadedness. No syncope. No orthopnea/PND. No edema.   • RESP:             No cough. No wheeze.  No increased sputum. No hemoptysis. No REGALADO.  • :      No dysuria or suprapubic discomfort. No frequency.No urgency. No hesitancy. No incontinence. No hematuria. No flank pain.    • MS:      + R hip joint stiffness and pain. No myalgias. No muscle weakness.    • SKIN:   No painful or pruritic rashes.  No skin discoloration.  • NEURO:          No focal numbness or weakness.  No headaches.  No ataxia. No slurred speech. No receptive/expressive aphasia.      • PSYCH:           No anxiety. + depression.  • ENDO:             No tremor, hair loss, heat or cold intolerance.     Objective   Temp:  [97.9 °F (36.6 °C)-99.2 °F (37.3 °C)] 99.1 °F (37.3 °C)  Heart Rate:  [] 100  Resp:  [12-18] 18  BP: (123-152)/(76-89) 133/88  PHYSICAL EXAM   • CON:   WN. WD. NAD.   • EYES:  Sclera anicteric. EOMI. Normal conjunctiva.   • ENT:    Oral mucosa normal without ulcers or thrush.    • NECK:             No thyromegaly. No stridor. Trachea midline.  • RESP:             CTA. No wheezes. No crackles.  No work of breathing or tachypnea.   • CV:      Rhythm regular. Rate WNL. No murmur noted.  No edema.  • GI:                   Soft and nontender. Nondistended.  Bowel sounds present.   • EXT:    R hip dressing intact.  Distally, RLE intact neurovascularly  • LYMPH:           No lymphedema noted.  No cervical lymphadenopathy.  • PSYCH:           Alert. Oriented. Normal affect and mood.  • NEURO:          CNII-XII grossly intact. No dysarthria or aphasia. No unilateral weakness or paresthesia.  • SKIN:   No chronic venous stasis changes or varicosities.  No cellulitis    Results from last 7 days   Lab Units 10/21/22  0403 10/15/22  1413   WBC 10*3/mm3 13.97* 11.85*   HEMOGLOBIN g/dL 9.9* 16.0   HEMATOCRIT % 28.8* 45.1   PLATELETS 10*3/mm3 189 280     Results from last 7 days   Lab Units 10/21/22  0403 10/15/22  1413   SODIUM mmol/L 135*  137   POTASSIUM mmol/L 4.3 4.6   CO2 mmol/L 28.9 25.1   CHLORIDE mmol/L 100 99   ANION GAP mmol/L 6.1 12.9   BUN mg/dL 12 12   CREATININE mg/dL 0.95 1.08   GLUCOSE mg/dL 123* 106*           RESULTS REVIEWED:  I have personally reviewed the results from the time of this admission to 10/21/2022 12:56 EDT and agree with these findings:  []  Laboratory  []  Microbiology  []  Radiology  []  EKG/Telemetry   []  Cardiology/Vascular   []  Pathology  []  Old records  []  Other:  Assessment / Plan   Assessment:    • Medical management   • CHARLY (does not have home CPAP)  • HTN  • Dyslipidemia  • Insomnia  • Tobacco use   • Hx of avascular necrosis, hips  • Hx of substance abuse, recovered  • S/P R total hip arthroplasty 10/20/22 by Dr. Mendez     Plan:  • POD#1  • Doing well.  Tolerating PT.  Pain controlled.    • Home with home health  • Continue home meds  • DVT prophylaxis starting on POD#1, per orthopedic surgeon  • Ortho clinic 2 weeks  DVT prophylaxis:  Mechanical DVT prophylaxis orders are present.  CODE STATUS:            Electronically signed by CT Granados, 10/21/22, 12:56 PM EDT.

## 2022-10-21 NOTE — THERAPY TREATMENT NOTE
Patient Name: Vasiliy Van  : 1980    MRN: [id number]                              Today's Date: 10/21/2022       Admit Date: 10/20/2022    Visit Dx:     ICD-10-CM ICD-9-CM   1. Difficulty in walking  R26.2 719.7   2. AVN (avascular necrosis of bone) (HCC)  M87.00 733.40   3. Osteoarthritis of right hip, unspecified osteoarthritis type  M16.11 715.95   4. Decreased activities of daily living (ADL)  Z78.9 V49.89     Patient Active Problem List   Diagnosis   • Nausea with vomiting   • GERD without esophagitis   • Right hip pain   • AVN (avascular necrosis of bone) (HCC)   • Osteoarthritis of right hip   • Arthritis of right hip     Past Medical History:   Diagnosis Date   • Arthritis    • Avascular necrosis (HCC)     RIGHT HIP   • GERD (gastroesophageal reflux disease)    • High blood pressure    • High cholesterol    • Insomnia    • Seizure (HCC)     X1 FELT D/T BUPROPION DENIES ANY FURTHER ISSUES   • Sleep apnea     NO CPAP   • Spinal headache      Past Surgical History:   Procedure Laterality Date   • ENDOSCOPY N/A 2021    Procedure: ESOPHAGOGASTRODUODENOSCOPY with biopsy;  Surgeon: Satish Mejia MD;  Location: [facility] ENDOSCOPY;  Service: General;  Laterality: N/A;  SLIDING HIATAL HERNIA   • HIP SURGERY Left 2020    Total hip replacement   • JOINT REPLACEMENT      TOTAL HIP LEFT SIDE   • LUMBAR LAMINECTOMY     • TOTAL HIP ARTHROPLASTY Right 10/20/2022    Procedure: RIGHT TOTAL HIP ANTERIOR ARTHOPLASTY;  Surgeon: Shantanu Mendez MD;  Location: [facility] MAIN OR;  Service: Orthopedics;  Laterality: Right;      General Information     Row Name 10/21/22 1106 10/21/22 1057       OT Time and Intention    Document Type therapy note (daily note)  -AV evaluation  -AV    Mode of Treatment individual therapy;occupational therapy  -AV individual therapy;occupational therapy  -AV    Row Name 10/21/22 1106 10/21/22 2718       General Information    Patient Profile Reviewed -- yes  -AV    Prior Level of  Function -- independent:;ADL's;home management;all household mobility  Stands to shower (tub).  Stands at sink to groom.  Uses regular commode.  Ambulates without assistive device.  No home oxygen.  -AV    Existing Precautions/Restrictions fall  WBAT  -AV fall  WBAT  -AV    Barriers to Rehab -- none identified  -AV    Row Name 10/21/22 1057          Occupational Profile    Reason for Services/Referral (Occupational Profile) Patient is a 42year old male admitted to [facility] on October 20, 2022 with right hip pain.  He is currently postop day 1: Total hip replacement with anterior approach.  He is on 2 N./room air.   OT consulted due to recent decline in ADL/transfer independence.  No previous OT services for current condition.  -AV     Row Name 10/21/22 1057          Living Environment    People in Home alone  Significant other will be staying with him postoperatively  -AV     Row Name 10/21/22 1057          Home Main Entrance    Number of Stairs, Main Entrance five  -AV     Row Name 10/21/22 1057          Stairs Within Home, Primary    Number of Stairs, Within Home, Primary none  -AV     Row Name 10/21/22 1106 10/21/22 1057       Cognition    Orientation Status (Cognition) --  Receptive to cues for safe positioning, safe transfers and adaptive techniques throughout session.  -AV --  Patient is alert, pleasant and cooperative- able to retain information and follow commands.  -AV    Row Name 10/21/22 1106 10/21/22 1057       Safety Issues, Functional Mobility    Impairments Affecting Function (Mobility) balance;endurance/activity tolerance  -AV balance;endurance/activity tolerance  -AV          User Key  (r) = Recorded By, (t) = Taken By, (c) = Cosigned By    Initials Name Provider Type    AV Monster Ward, OT Occupational Therapist                 Mobility/ADL's     Row Name 10/21/22 1107 10/21/22 1051       Sit-Stand Transfer    Sit-Stand [place] (Transfers) standby assist  -AV standby assist  -AV     Assistive Device (Sit-Stand Transfers) walker, front-wheeled  -AV walker, front-wheeled  -AV    Row Name 10/21/22 1107 10/21/22 1059       Activities of Daily Living    BADL Assessment/Intervention --  Patient declined bathing and already had shirt and pants on upon arrival.  Independent doffing socks with cues for adaptive technique.  Independent donning left sock and slip on shoe.  Max assist donning right sock and slip on shoe.  -AV --  Independent feeding, grooming and UB bathing/dressing with set up while seated.  Min assist LB bathing/dressing.  CGA toilet hygiene.  -AV          User Key  (r) = Recorded By, (t) = Taken By, (c) = Cosigned By    Initials Name Provider Type    Monster Dao OT Occupational Therapist               Obj/Interventions     Row Name 10/21/22 1100          Sensory Assessment (Somatosensory)    Sensory Assessment (Somatosensory) UE sensation intact  -AV     Row Name 10/21/22 1100          Vision Assessment/Intervention    Visual Impairment/Limitations WFL  -AV     Row Name 10/21/22 1100          Range of Motion Comprehensive    General Range of Motion bilateral upper extremity ROM WFL  AROM  -AV     Row Name 10/21/22 1100          Strength Comprehensive (MMT)    Comment, General Manual Muscle Testing (MMT) Assessment 5/5 bilateral upper extremities  -AV     Row Name 10/21/22 1100          Motor Skills    Motor Skills coordination;functional endurance  -AV     Coordination WFL  Right dominant  -AV     Functional Endurance Fair  -AV     Row Name 10/21/22 1108 10/21/22 1100       Balance    Dynamic Standing Balance -- standby assist  -AV    Position/Device Used, Standing Balance -- walker, front-wheeled  -AV    Balance Interventions sitting;standing;sit to stand;supported;minimal challenge;occupation based/functional task  -AV --          User Key  (r) = Recorded By, (t) = Taken By, (c) = Cosigned By    Initials Name Provider Type    Monster Dao OT Occupational Therapist                Goals/Plan     Row Name 10/21/22 1103          Transfer Goal 1 (OT)    Activity/Assistive Device (Transfer Goal 1, OT) transfers, all;walker, rolling  -AV     [place] Level/Cues Needed (Transfer Goal 1, OT) modified independence  -AV     Time Frame (Transfer Goal 1, OT) long term goal (LTG);10 days  -AV     Row Name 10/21/22 1103          Bathing Goal 1 (OT)    Activity/Device (Bathing Goal 1, OT) bathing skills, all;shower chair  -AV     [place] Level/Cues Needed (Bathing Goal 1, OT) modified independence  -AV     Time Frame (Bathing Goal 1, OT) long term goal (LTG);10 days  -AV     Row Name 10/21/22 1103          Dressing Goal 1 (OT)    Activity/Device (Dressing Goal 1, OT) dressing skills, all  -AV     [place]/Cues Needed (Dressing Goal 1, OT) modified independence  -AV     Time Frame (Dressing Goal 1, OT) long term goal (LTG);10 days  -AV     Row Name 10/21/22 1103          Toileting Goal 1 (OT)    Activity/Device (Toileting Goal 1, OT) toileting skills, all;raised toilet seat  -AV     [place] Level/Cues Needed (Toileting Goal 1, OT) modified independence  -AV     Time Frame (Toileting Goal 1, OT) long term goal (LTG);10 days  -AV     Row Name 10/21/22 1103          Grooming Goal 1 (OT)    Activity/Device (Grooming Goal 1, OT) grooming skills, all  -AV     [place] (Grooming Goal 1, OT) modified independence  Standing at sink  -AV     Time Frame (Grooming Goal 1, OT) long term goal (LTG);10 days  -AV     Row Name 10/21/22 1103          Problem Specific Goal 1 (OT)    Problem Specific Goal 1 (OT) Patient will demonstrate good standing endurance/activity tolerance needed to support ADLs.  -AV     Time Frame (Problem Specific Goal 1, OT) long term goal (LTG);10 days  -AV     Row Name 10/21/22 1103          Therapy Assessment/Plan (OT)    Planned Therapy Interventions (OT) activity tolerance training;BADL retraining;functional balance retraining;occupation/activity based  interventions;IADL retraining;patient/caregiver education/training;transfer/mobility retraining  -AV           User Key  (r) = Recorded By, (t) = Taken By, (c) = Cosigned By    Initials Name Provider Type    AV Monster Ward, DANNY Occupational Therapist               Clinical Impression     Row Name 10/21/22 1102          Pain Assessment    Additional Documentation Pain Scale: FACES Pre/Post-Treatment (Group)  -AV     Row Name 10/21/22 1102          Pain Scale: FACES Pre/Post-Treatment    Pain: FACES Scale, Pretreatment 2-->hurts little bit  -AV     Posttreatment Pain Rating 2-->hurts little bit  -AV     Pain Location - Side/Orientation Right  -AV     Pain Location - hip  -AV     Row Name 10/21/22 1102          Plan of Care Review    Plan of Care Reviewed With patient;significant other  -AV     Progress improving  -AV     Outcome Evaluation Patient presents with limitations of balance and endurance/activity tolerance which impede his ability to perform ADL and transfers as prior.  The skills of a therapist will be required to safely and effectively implement treatment plan to restore maximal level of function.  -AV     Row Name 10/21/22 1102          Therapy Assessment/Plan (OT)    Patient/Family Therapy Goal Statement (OT) Feel better  -AV     Rehab Potential (OT) good, to achieve stated therapy goals  -AV     Criteria for Skilled Therapeutic Interventions Met (OT) yes;meets criteria;skilled treatment is necessary  -AV     Therapy Frequency (OT) 5 times/wk  -AV     Row Name 10/21/22 1102          Therapy Plan Review/Discharge Plan (OT)    Equipment Needs Upon Discharge (OT) walker, rolling;shower chair  Has 3 in 1 commode available  -AV     Row Name 10/21/22 1102          Vital Signs    O2 Delivery Pre Treatment room air  -AV     O2 Delivery Intra Treatment room air  -AV     O2 Delivery Post Treatment room air  -AV           User Key  (r) = Recorded By, (t) = Taken By, (c) = Cosigned By    Initials Name Provider  Type    AV Monster Ward OT Occupational Therapist               Outcome Measures     Row Name 10/21/22 1103          How much help from another is currently needed...    Putting on and taking off regular lower body clothing? 3  -AV     Bathing (including washing, rinsing, and drying) 3  -AV     Toileting (which includes using toilet bed pan or urinal) 3  -AV     Putting on and taking off regular upper body clothing 4  -AV     Taking care of personal grooming (such as brushing teeth) 4  -AV     Eating meals 4  -AV     AM-PAC 6 Clicks Score (OT) 21  -AV     Row Name 10/21/22 1103          Functional Assessment    Outcome Measure Options AM-PAC 6 Clicks Daily Activity (OT);Optimal Instrument  -AV     Row Name 10/21/22 1103          Optimal Instrument    Optimal Instrument Optimal - 3  -AV     Bending/Stooping 2  -AV     Standing 2  -AV     Reaching 1  -AV     From the list, choose the 3 activities you would most like to be able to do without any difficulty Bending/stooping;Standing;Reaching  -AV     Total Score Optimal - 3 5  -AV           User Key  (r) = Recorded By, (t) = Taken By, (c) = Cosigned By    Initials Name Provider Type    Monster Dao OT Occupational Therapist                Occupational Therapy Education     Title: PT OT SLP Therapies (Done)     Topic: Occupational Therapy (Done)     Point: ADL training (Done)     Description:   Instruct learner(s) on proper safety adaptation and remediation techniques during self care or transfers.   Instruct in proper use of assistive devices.              Learning Progress Summary           Patient Acceptance, E, VU by AYALA at 10/21/2022 1104    Comment: WBAT  Need for staff assistance for all standing ADL/transfers  Safe transfer techniques  Safe positioning for ADLs  Adaptive techniques for lower body ADLs  ADL home safety/ adaptive equipment recommendations                   Point: Home exercise program (Done)     Description:   Instruct learner(s) on  appropriate technique for monitoring, assisting and/or progressing therapeutic exercises/activities.              Learning Progress Summary           Patient Acceptance, E, VU by AV at 10/21/2022 1104    Comment: WBAT  Need for staff assistance for all standing ADL/transfers  Safe transfer techniques  Safe positioning for ADLs  Adaptive techniques for lower body ADLs  ADL home safety/ adaptive equipment recommendations                   Point: Precautions (Done)     Description:   Instruct learner(s) on prescribed precautions during self-care and functional transfers.              Learning Progress Summary           Patient Acceptance, E, VU by AV at 10/21/2022 1104    Comment: WBAT  Need for staff assistance for all standing ADL/transfers  Safe transfer techniques  Safe positioning for ADLs  Adaptive techniques for lower body ADLs  ADL home safety/ adaptive equipment recommendations                   Point: Body mechanics (Done)     Description:   Instruct learner(s) on proper positioning and spine alignment during self-care, functional mobility activities and/or exercises.              Learning Progress Summary           Patient Acceptance, E, VU by AV at 10/21/2022 1104    Comment: WBAT  Need for staff assistance for all standing ADL/transfers  Safe transfer techniques  Safe positioning for ADLs  Adaptive techniques for lower body ADLs  ADL home safety/ adaptive equipment recommendations                               User Key     Initials Effective Dates Name Provider Type Discipline     06/16/21 -  Monster Ward OT Occupational Therapist OT              OT Recommendation and Plan  Planned Therapy Interventions (OT): activity tolerance training, BADL retraining, functional balance retraining, occupation/activity based interventions, IADL retraining, patient/caregiver education/training, transfer/mobility retraining  Therapy Frequency (OT): 5 times/wk  Plan of Care Review  Plan of Care Reviewed With: patient,  significant other  Progress: improving  Outcome Evaluation: Patient presents with limitations of balance and endurance/activity tolerance which impede his ability to perform ADL and transfers as prior.  The skills of a therapist will be required to safely and effectively implement treatment plan to restore maximal level of function.     Time Calculation:    Time Calculation- OT     Row Name 10/21/22 1105             Time Calculation- OT    OT Received On 10/21/22  -AV      OT Goal Re-Cert Due Date 10/30/22  -AV         Timed Charges    82943 - OT Self Care/Mgmt Minutes 10  -AV         Untimed Charges    OT Eval/Re-eval Minutes 35  -AV         Total Minutes    Timed Charges Total Minutes 10  -AV      Untimed Charges Total Minutes 35  -AV       Total Minutes 45  -AV            User Key  (r) = Recorded By, (t) = Taken By, (c) = Cosigned By    Initials Name Provider Type    AV Monster Ward OT Occupational Therapist              Therapy Charges for Today     Code Description Service Date Service Provider Modifiers Qty    43623687714 HC OT SELF CARE/MGMT/TRAIN EA 15 MIN 10/21/2022 Monster Ward OT GO 1    38691124277 HC OT EVAL LOW COMPLEXITY 3 10/21/2022 Monster Ward OT GO 1               Monster Ward OT  10/21/2022

## 2022-10-21 NOTE — SIGNIFICANT NOTE
10/21/22 0912   Plan   Final Discharge Disposition Code 06 - home with home health care   Final Note Home with [facility]

## 2022-10-21 NOTE — PLAN OF CARE
Goal Outcome Evaluation:  Plan of Care Reviewed With: patient        Progress: no change  Outcome Evaluation: pt. ambulated 250 feet this, he is a one person assist with walker.  vss.  pt. medicated with prn pain medication this shift with pain easing down after medication.  upon discharge pt. is going to have home health

## 2022-10-24 ENCOUNTER — OFFICE VISIT (OUTPATIENT)
Dept: ORTHOPEDIC SURGERY | Facility: CLINIC | Age: 42
End: 2022-10-24

## 2022-10-24 VITALS — OXYGEN SATURATION: 99 % | HEIGHT: 74 IN | WEIGHT: 216 LBS | HEART RATE: 68 BPM | BODY MASS INDEX: 27.72 KG/M2

## 2022-10-24 DIAGNOSIS — Z96.641 AFTERCARE FOLLOWING RIGHT HIP JOINT REPLACEMENT SURGERY: Primary | ICD-10-CM

## 2022-10-24 DIAGNOSIS — Z47.1 AFTERCARE FOLLOWING RIGHT HIP JOINT REPLACEMENT SURGERY: Primary | ICD-10-CM

## 2022-10-24 DIAGNOSIS — Z96.641 S/P HIP REPLACEMENT, RIGHT: Primary | ICD-10-CM

## 2022-10-24 PROCEDURE — 99024 POSTOP FOLLOW-UP VISIT: CPT | Performed by: STUDENT IN AN ORGANIZED HEALTH CARE EDUCATION/TRAINING PROGRAM

## 2022-10-24 RX ORDER — CEPHALEXIN 500 MG/1
500 CAPSULE ORAL 2 TIMES DAILY
Qty: 4 CAPSULE | Refills: 0 | Status: CANCELLED | OUTPATIENT
Start: 2022-10-24

## 2022-10-24 RX ORDER — CEPHALEXIN 500 MG/1
500 CAPSULE ORAL EVERY 6 HOURS
Qty: 28 CAPSULE | Refills: 0 | Status: SHIPPED | OUTPATIENT
Start: 2022-10-24 | End: 2022-10-31

## 2022-10-24 NOTE — PROGRESS NOTES
"Chief Complaint  Follow-up and Pain of the Right Hip    Subjective          Vasiliy Van presents to [facility] for   History of Present Illness    The patient presents here today for follow up evaluation of the right hip. The patient is S/P Right Total Hip Arthroplasty 10/20/22. The patient reports he started having some bleeding when he got home and fluid leaking around the bandage. He removed the bandage and his skin came off in an area. He has been attending physical therapy. He is ambulating on a walker.     Allergies   Allergen Reactions   • Bupropion Seizure        Social History     Socioeconomic History   • Marital status: Legally    Tobacco Use   • Smoking status: Every Day     Packs/day: 1.00     Years: 15.00     Pack years: 15.00     Types: Cigarettes   • Smokeless tobacco: Never   • Tobacco comments:     Last smoked 5am 10/20   Vaping Use   • Vaping Use: Never used   Substance and Sexual Activity   • Alcohol use: Never   • Drug use: Never   • Sexual activity: Defer        I reviewed the patient's chief complaint, history of present illness, review of systems, past medical history, surgical history, family history, social history, medications, and allergy list.     REVIEW OF SYSTEMS    Constitutional: Denies fevers, chills, weight loss  Cardiovascular: Denies chest pain, shortness of breath  Skin: Denies rashes, acute skin changes  Neurologic: Denies headache, loss of consciousness  MSK: Right hip pain      Objective   Vital Signs:   Pulse 68   Ht 188 cm (74\")   Wt 98 kg (216 lb)   SpO2 99%   BMI 27.73 kg/m²     Body mass index is 27.73 kg/m².    Physical Exam    General: Alert. No acute distress.   Right lower extremity- area of skin tearing with light erythema. Mild serous drainage from skin tear. Incision well healing. No signs of infection. Sensation grossly intact. No cellulitis. Minimal pain with hip ROM. Calf soft, non-tender. Positive EHL, FHL, " GS and TA. Sensation intact to all 5 nerves of the foot. Positive pulses. Leg lengths appear equal.     Procedures    Imaging Results (Most Recent)     None                   Assessment and Plan        FL < 1 Hour    Result Date: 10/20/2022  Narrative: This procedure was auto-finalized with no dictation required.    XR Hip With or Without Pelvis 2 - 3 View Right    Result Date: 10/20/2022  Narrative: PROCEDURE: XR HIP W OR WO PELVIS 2-3 VIEW RIGHT  COMPARISON: [facility], CR, XR HIP W OR WO PELVIS 2-3 VIEW RIGHT, 10/20/2022, 8:11.  INDICATIONS: POST-OP RIGHT HIP  FINDINGS:  There is a total right hip prosthesis.  No fracture or dislocation.  There is adjacent soft tissue gas.  Alignment is anatomic.  The prosthesis appears intact.      Impression:  Intact right hip prosthesis without complicating features.      JENNIFER PERDOMO MD       Electronically Signed and Approved By: JENNIFER PERDOMO MD on 10/20/2022 at 10:07             XR Hip With or Without Pelvis 2 - 3 View Right    Result Date: 10/20/2022  Narrative: PROCEDURE: XR HIP W OR WO PELVIS 2-3 VIEW RIGHT  COMPARISON: [facility], CR, XR HIP W OR WO PELVIS 2-3 VIEW RIGHT, 10/15/2022, 14:35.  INDICATIONS: RIGHT HIP OA/FLUORO TIME 47 SECONDS/8.77 mGy/5 IMAGES  FINDINGS:  Five intraoperative fluoroscopic images demonstrate placement of a right total hip prosthesis.  Please see operative report for full findings and details.      Impression:   1. Five intraoperative fluoroscopic images demonstrate placement of a right total hip prosthesis.       JAYSHREE RUELAS MD       Electronically Signed and Approved By: JAYSHREE RUELAS MD on 10/20/2022 at 9:22             XR Hip With or Without Pelvis 2 - 3 View Right    Result Date: 10/15/2022  Narrative: PROCEDURE: XR HIP W OR WO PELVIS 2-3 VIEW RIGHT  COMPARISON: [facility], CR, XR HIP W OR WO PELVIS 2-3 VIEW RIGHT, 2/15/2022, 20:27.  [facility] , CR, XR HIP W OR WO PELVIS 2-3  VIEW RIGHT, 9/16/2022, 10:07.  INDICATIONS: RIGHT HIP PAIN POST FALL  FINDINGS:  Sclerosis in a geographic pattern is again noted of the right femoral head.  No deformity of the femoral head is appreciated.  Mild degenerative changes at the joint space is appreciated.  Limited imaging of the visualized pelvis is grossly unremarkable in appearance.  Total arthroplasty of the left hip is again noted.  Soft tissues are unremarkable      Impression:   1. No acute osseous abnormality 2. Findings compatible with a vascular necrosis of the right hip 3. Images from total left hip arthroplasty without acute abnormality appreciated      GEOVANNI PIMENTEL MD       Electronically Signed and Approved By: GEOVANNI PIMENTEL MD on 10/15/2022 at 16:20                Diagnoses and all orders for this visit:    1. S/P hip replacement, right (Primary)        Discussed the treatment plan with the patient.  Plan to continue daily dressing changes. Plan to keep the incision clean and dry.  Given the proximity of the skin tear to the incision we will start him on oral Keflex prophylactically.  Plan to continue physical therapy.  Continue aspirin 325 mg daily.  Discussed concerning signs and symptoms.  Keep scheduled follow-up appointment.      Will obtain X-Rays of Right hip at next visit.     Call or return if worsening symptoms.    Scribed for Shantanu Mendez MD by Marilin Mohamud  10/24/2022   11:11 EDT         Follow Up       Keep original 2 week follow up    Patient was given instructions and counseling regarding his condition or for health maintenance advice. Please see specific information pulled into the AVS if appropriate.       I have personally performed the services described in this document as scribed by the above individual and it is both accurate and complete.     Shantanu Mendez MD  10/24/22  11:30 EDT

## 2022-10-24 NOTE — THERAPY DISCHARGE NOTE
Acute Care - Occupational Therapy Discharge   Al    Patient Name: Vasiliy Van  : 1980    MRN: [id number]                              Today's Date: 10/24/2022       Admit Date: 10/20/2022    Visit Dx:     ICD-10-CM ICD-9-CM   1. Difficulty in walking  R26.2 719.7   2. AVN (avascular necrosis of bone) (HCC)  M87.00 733.40   3. Osteoarthritis of right hip, unspecified osteoarthritis type  M16.11 715.95   4. Decreased activities of daily living (ADL)  Z78.9 V49.89     Patient Active Problem List   Diagnosis   • Nausea with vomiting   • GERD without esophagitis   • Right hip pain   • AVN (avascular necrosis of bone) ([facility])   • Osteoarthritis of right hip   • Arthritis of right hip     Past Medical History:   Diagnosis Date   • Arthritis    • Avascular necrosis (HCC)     RIGHT HIP   • GERD (gastroesophageal reflux disease)    • High blood pressure    • High cholesterol    • Insomnia    • Seizure (HCC)     X1 FELT D/T BUPROPION DENIES ANY FURTHER ISSUES   • Sleep apnea     NO CPAP   • Spinal headache      Past Surgical History:   Procedure Laterality Date   • ENDOSCOPY N/A 2021    Procedure: ESOPHAGOGASTRODUODENOSCOPY with biopsy;  Surgeon: Satish Mejia MD;  Location: [facility] ENDOSCOPY;  Service: General;  Laterality: N/A;  SLIDING HIATAL HERNIA   • HIP SURGERY Left 2020    Total hip replacement   • JOINT REPLACEMENT      TOTAL HIP LEFT SIDE   • LUMBAR LAMINECTOMY     • TOTAL HIP ARTHROPLASTY Right 10/20/2022    Procedure: RIGHT TOTAL HIP ANTERIOR ARTHOPLASTY;  Surgeon: Shantanu Mendez MD;  Location: [facility] MAIN OR;  Service: Orthopedics;  Laterality: Right;      General Information    No documentation.                Mobility/ADL's    No documentation.                Obj/Interventions    No documentation.                Goals/Plan     Row Name 10/24/22 1050          Transfer Goal 1 (OT)    Activity/Assistive Device (Transfer Goal 1, OT) transfers, all;walker, rolling  -AV      Hammond Level/Cues Needed (Transfer Goal 1, OT) modified independence  -AV     Time Frame (Transfer Goal 1, OT) long term goal (LTG);10 days  -AV     Progress/Outcome (Transfer Goal 1, OT) goal not met;discharged from [facility]  -     Row Name 10/24/22 1050          Bathing Goal 1 (OT)    Activity/Device (Bathing Goal 1, OT) bathing skills, all;shower chair  -AV     Hammond Level/Cues Needed (Bathing Goal 1, OT) modified independence  -AV     Time Frame (Bathing Goal 1, OT) long term goal (LTG);10 days  -AV     Progress/Outcomes (Bathing Goal 1, OT) goal not met;discharged from [facility]     Row Name 10/24/22 1050          Dressing Goal 1 (OT)    Activity/Device (Dressing Goal 1, OT) dressing skills, all  -AV     Hammond/Cues Needed (Dressing Goal 1, OT) modified independence  -AV     Time Frame (Dressing Goal 1, OT) long term goal (LTG);10 days  -AV     Progress/Outcome (Dressing Goal 1, OT) goal not met;discharged from [facility]     Row Name 10/24/22 1050          Toileting Goal 1 (OT)    Activity/Device (Toileting Goal 1, OT) toileting skills, all;raised toilet seat  -AV     Hammond Level/Cues Needed (Toileting Goal 1, OT) modified independence  -AV     Time Frame (Toileting Goal 1, OT) long term goal (LTG);10 days  -AV     Progress/Outcome (Toileting Goal 1, OT) goal not met;discharged from [facility]  -     Row Name 10/24/22 1050          Grooming Goal 1 (OT)    Activity/Device (Grooming Goal 1, OT) grooming skills, all  -AV     Hammond (Grooming Goal 1, OT) modified independence  -AV     Time Frame (Grooming Goal 1, OT) long term goal (LTG);10 days  -AV     Progress/Outcome (Grooming Goal 1, OT) goal not met;discharged from [facility]     Row Name 10/24/22 1050          Problem Specific Goal 1 (OT)    Problem Specific Goal 1 (OT) Patient will demonstrate good standing endurance/activity tolerance needed to support ADLs.  -AV     Time Frame (Problem Specific Goal 1, OT) long  term goal (LTG);10 days  -AV     Progress/Outcome (Problem Specific Goal 1, OT) goal not met;discharged from facility  -AV           User Key  (r) = Recorded By, (t) = Taken By, (c) = Cosigned By    Initials Name Provider Type    Monster Dao OT Occupational Therapist               Clinical Impression    No documentation.                Outcome Measures    No documentation.               Occupational Therapy Education     Title: PT OT SLP Therapies (Resolved)     Topic: Occupational Therapy (Resolved)     Point: ADL training (Resolved)     Description:   Instruct learner(s) on proper safety adaptation and remediation techniques during self care or transfers.   Instruct in proper use of assistive devices.              Learning Progress Summary           Patient Acceptance, E, VU by AV at 10/21/2022 1104    Comment: WBAT  Need for staff assistance for all standing ADL/transfers  Safe transfer techniques  Safe positioning for ADLs  Adaptive techniques for lower body ADLs  ADL home safety/ adaptive equipment recommendations                   Point: Home exercise program (Resolved)     Description:   Instruct learner(s) on appropriate technique for monitoring, assisting and/or progressing therapeutic exercises/activities.              Learning Progress Summary           Patient Acceptance, E, VU by AV at 10/21/2022 1104    Comment: WBAT  Need for staff assistance for all standing ADL/transfers  Safe transfer techniques  Safe positioning for ADLs  Adaptive techniques for lower body ADLs  ADL home safety/ adaptive equipment recommendations                   Point: Precautions (Resolved)     Description:   Instruct learner(s) on prescribed precautions during self-care and functional transfers.              Learning Progress Summary           Patient Acceptance, E, VU by AV at 10/21/2022 1104    Comment: WBAT  Need for staff assistance for all standing ADL/transfers  Safe transfer techniques  Safe positioning for  ADLs  Adaptive techniques for lower body ADLs  ADL home safety/ adaptive equipment recommendations                   Point: Body mechanics (Resolved)     Description:   Instruct learner(s) on proper positioning and spine alignment during self-care, functional mobility activities and/or exercises.              Learning Progress Summary           Patient Acceptance, E, VU by AYALA at 10/21/2022 1104    Comment: WBAT  Need for staff assistance for all standing ADL/transfers  Safe transfer techniques  Safe positioning for ADLs  Adaptive techniques for lower body ADLs  ADL home safety/ adaptive equipment recommendations                               User Key     Initials Effective Dates Name Provider Type Discipline    AYALA 06/16/21 -  Monster Ward, DANNY Occupational Therapist OT              OT Recommendation and Plan  Planned Therapy Interventions (OT): activity tolerance training, BADL retraining, functional balance retraining, occupation/activity based interventions, IADL retraining, patient/caregiver education/training, transfer/mobility retraining  Therapy Frequency (OT): 5 times/wk  Plan of Care Review  Plan of Care Reviewed With: patient, significant other  Progress: improving  Outcome Evaluation: Patient presents with limitations of balance and endurance/activity tolerance which impede his ability to perform ADL and transfers as prior.  The skills of a therapist will be required to safely and effectively implement treatment plan to restore maximal level of function.  Plan of Care Reviewed With: patient, significant other  Outcome Evaluation: Patient presents with limitations of balance and endurance/activity tolerance which impede his ability to perform ADL and transfers as prior.  The skills of a therapist will be required to safely and effectively implement treatment plan to restore maximal level of function.     Time Calculation:            OT Discharge Summary  Reason for Discharge: Discharge from  facility    Monster Ward, OT  10/24/2022

## 2022-10-25 ENCOUNTER — TELEPHONE (OUTPATIENT)
Dept: ORTHOPEDIC SURGERY | Facility: CLINIC | Age: 42
End: 2022-10-25

## 2022-10-25 DIAGNOSIS — M87.00 AVN (AVASCULAR NECROSIS OF BONE): ICD-10-CM

## 2022-10-25 RX ORDER — OXYCODONE AND ACETAMINOPHEN 10; 325 MG/1; MG/1
1 TABLET ORAL EVERY 4 HOURS PRN
Qty: 40 TABLET | Refills: 0 | Status: SHIPPED | OUTPATIENT
Start: 2022-10-25

## 2022-10-25 NOTE — TELEPHONE ENCOUNTER
Caller: DAVY    Relationship: SELF     Best call back number: [phone]    Requested Prescriptions:   Requested Prescriptions     Pending Prescriptions Disp Refills   • oxyCODONE-acetaminophen (PERCOCET)  MG per tablet 40 tablet 0     Sig: Take 1 tablet by mouth Every 4 (Four) Hours As Needed for Severe Pain.        Pharmacy where request should be sent: [facility] [place], [place] - [address]  [facility] [phone] [facility] [phone] FX      Does the patient have less than a 3 day supply:  [x] Yes  [] No

## 2022-10-25 NOTE — TELEPHONE ENCOUNTER
Transition to Percocet 5 mg next refill    Electronically signed by Shantanu Mendez MD, 10/25/22, 12:37 PM EDT.

## 2022-11-01 NOTE — PROGRESS NOTES
Chief Complaint  Follow-up of the Right Hip    Subjective          Vasiliy Van presents to [facility] for   History of Present Illness     Vasiliy Van presents today for a follow-up of his right hip.  Patient is 2 weeks postop right total hip arthroplasty performed Dr. Mendez on 10/20/2022.  Today, patient states he is doing okay.  He reports some pain to the thigh but minimal pain to his hip.  He has continued taking oxycodone every 4 hours for his pain.  He describes being stiff in the morning or after sitting for an extended period of time.  He ambulates with a cane initially but states throughout the day he is able to ambulate without any assistive devices.  He states that he has home physical therapy 3 times a week and has been making improvements.  Patient explains this is the last week with home physical therapy.  He has been performing daily dry dressing changes over his incision due to his skin tear.  He denies any new wound drainage or redness.  Denies fever or chills.  He has been taking Keflex for prophylaxis.  He is currently on aspirin for DVT prophylaxis.  He denies numbness or tingling.    Allergies   Allergen Reactions   • Bupropion Seizure        Social History     Socioeconomic History   • Marital status: Legally    Tobacco Use   • Smoking status: Every Day     Packs/day: 1.00     Years: 15.00     Pack years: 15.00     Types: Cigarettes   • Smokeless tobacco: Never   • Tobacco comments:     Last smoked 5am 10/20   Vaping Use   • Vaping Use: Never used   Substance and Sexual Activity   • Alcohol use: Never   • Drug use: Never   • Sexual activity: Defer        I reviewed the patient's chief complaint, history of present illness, review of systems, past medical history, surgical history, family history, social history, medications, and allergy list.     REVIEW OF SYSTEMS    Constitutional: Denies fevers, chills, weight loss  Cardiovascular:  "Denies chest pain, shortness of breath  Skin: Denies rashes, acute skin changes  Neurologic: Denies headache, loss of consciousness  MSK: Right hip pain      Objective   Vital Signs:   Ht 188 cm (74\")   Wt 97.5 kg (215 lb)   BMI 27.60 kg/m²     Body mass index is 27.6 kg/m².    Physical Exam    General: Alert, no acute distress  Gait: Antalgic favoring right, ambulates with a cane.  Right lower extremity: Sutures removed today without complications.  Anterior hip incision is healing appropriately.  No redness or active drainage noted.  No concerning signs of infection.  Skin tear to the medial aspect of the incision, no active drainage today, healing appropriately, no concerning signs for infection.  Nontender to palpation of the thigh.  Mild swelling. No pain with passive hip ROM.  Knee extensor mechanism intact.  5 out of 5 hip abduction.  Hip flexion intact.  Calf soft, nontender.  Demonstrates active ankle plantarflexion and dorsiflexion.  Sensation intact over the dorsal and plantar foot.  Palpable pedal pulses.    Procedures    Imaging Results (Most Recent)     Procedure Component Value Units Date/Time    XR Hip With or Without Pelvis 2 - 3 View Right [[id number]] Resulted: 11/02/22 1151     Updated: 11/02/22 1153    Narrative:      Indications: Follow-up right total hip arthroplasty    Views: AP and frog lateral right hip    Findings: Press-fit right total hip arthroplasty implants in place.    Implant positioning is stable compared to immediate postoperative films.    Single acetabular screw in place.  No hardware loosening or complications.    Hip is reduced.    Comparative Data: Comparative data found and reviewed today.                     Assessment and Plan    Diagnoses and all orders for this visit:    1. Status post total replacement of right hip (Primary)  -     XR Hip With or Without Pelvis 2 - 3 View Right  -     Ambulatory Referral to Physical Therapy POST OP, Ortho, Evaluate and " treat        Vasiliy Van presents today 2 weeks status post right total hip arthroplasty performed by Dr. Mendez on 10/20/2022. X-rays were reviewed with the patient today. Sutures removed today without complications. Incision is well healing without active drainage or redness noted. No concerning signs of infection.  Skin tear is well-healing without concerning signs for infection.  Patient will continue with daily dry dressing changes over the skin tear. Patient denies fever or chills. Incision site care was reviewed today. Patient instructed not to soak or submerge incision. Do not apply any lotions, creams, or ointments to the incision at this time.  We discussed concerning signs and symptoms regarding the incision site.  Patient understood and agreed. Patient instructed to continue with home physical therapy.  Formal physical therapy was ordered today for patient to begin outpatient therapy next week. We discussed the importance of these exercises.  Norco prescription written today.  We discussed the importance of weaning from narcotic medications. Patient understood and agreed. Patient will continue aspirin for DVT prophylaxis until 4 weeks post op. Patient expressed understanding. Patient will follow up in 3 weeks for reevaluation. We will obtain new x-rays of the right hip at next visit.       Call or return if symptoms worsen or patient has any concerns.   Will obtain X-Rays of right hip at next visit.       Follow Up   Return in about 3 weeks (around 11/23/2022).  Patient was given instructions and counseling regarding his condition or for health maintenance advice. Please see specific information pulled into the AVS if appropriate.       Rosa Munoz PA-C  11/02/22  12:15 EDT

## 2022-11-02 ENCOUNTER — OFFICE VISIT (OUTPATIENT)
Dept: ORTHOPEDIC SURGERY | Facility: CLINIC | Age: 42
End: 2022-11-02

## 2022-11-02 VITALS — BODY MASS INDEX: 27.59 KG/M2 | WEIGHT: 215 LBS | HEIGHT: 74 IN

## 2022-11-02 DIAGNOSIS — Z96.641 STATUS POST TOTAL REPLACEMENT OF RIGHT HIP: Primary | ICD-10-CM

## 2022-11-02 PROCEDURE — 99024 POSTOP FOLLOW-UP VISIT: CPT | Performed by: PHYSICIAN ASSISTANT

## 2022-11-02 RX ORDER — HYDROCODONE BITARTRATE AND ACETAMINOPHEN 5; 325 MG/1; MG/1
1 TABLET ORAL EVERY 6 HOURS PRN
Qty: 30 TABLET | Refills: 0 | Status: SHIPPED | OUTPATIENT
Start: 2022-11-02 | End: 2022-11-07 | Stop reason: SDUPTHER

## 2022-11-04 ENCOUNTER — TELEPHONE (OUTPATIENT)
Dept: ORTHOPEDIC SURGERY | Facility: CLINIC | Age: 42
End: 2022-11-04

## 2022-11-04 NOTE — TELEPHONE ENCOUNTER
PATIENT WAS SEEN IN OFFICE THIS WEEK.  PATIENT WAS REDUCED ON PAIN MEDS.  PHYSICAL THERAPIST STATES THAT PAIN LEVEL AND TOLERANCE FOR THERAPY ARE GREATLY AFFECTED.  CAN PATIENT TAKE 2 PILLS EVERY 6 OR 1 EVERY FOUR?  OR CAN SOMETHING STRONGER BE CALLED IN.  [phone]

## 2022-11-04 NOTE — TELEPHONE ENCOUNTER
Per Dr Mendez therapist Gary notified...Start with 1 q 4 hours, may go up to 2 q 4 hours if needed. Gary verbalized understanding.

## 2022-11-07 ENCOUNTER — TELEPHONE (OUTPATIENT)
Dept: ORTHOPEDIC SURGERY | Facility: CLINIC | Age: 42
End: 2022-11-07

## 2022-11-07 DIAGNOSIS — Z96.641 STATUS POST TOTAL REPLACEMENT OF RIGHT HIP: ICD-10-CM

## 2022-11-07 RX ORDER — HYDROCODONE BITARTRATE AND ACETAMINOPHEN 5; 325 MG/1; MG/1
1-2 TABLET ORAL EVERY 4 HOURS PRN
Qty: 30 TABLET | Refills: 0 | Status: SHIPPED | OUTPATIENT
Start: 2022-11-07 | End: 2022-11-09 | Stop reason: SDUPTHER

## 2022-11-07 NOTE — TELEPHONE ENCOUNTER
Refill sent. Norco 5 1-2 q 4 x 30   C[employer] Intermediate Repair Preamble Text (Leave Blank If You Do Not Want): Undermining was performed with blunt dissection.

## 2022-11-07 NOTE — TELEPHONE ENCOUNTER
PATIENT REQUESTING REFILL OF PAIN MEDICAITON. STATES HE WAS ADVISED Thursday BY DR. PITTS TO TAKE 2 EVERY 4 HOURS PRN AND IS RUNNING LOW DUE TO HAVING TO TAKE 2. PATIENT PHARMACY IS [facility] [address].

## 2022-11-09 ENCOUNTER — TELEPHONE (OUTPATIENT)
Dept: ORTHOPEDIC SURGERY | Facility: CLINIC | Age: 42
End: 2022-11-09

## 2022-11-09 DIAGNOSIS — Z96.641 STATUS POST TOTAL REPLACEMENT OF RIGHT HIP: ICD-10-CM

## 2022-11-09 RX ORDER — HYDROCODONE BITARTRATE AND ACETAMINOPHEN 5; 325 MG/1; MG/1
1-2 TABLET ORAL EVERY 4 HOURS PRN
Qty: 30 TABLET | Refills: 0 | Status: SHIPPED | OUTPATIENT
Start: 2022-11-09 | End: 2022-11-15 | Stop reason: SDUPTHER

## 2022-11-09 NOTE — TELEPHONE ENCOUNTER
PATIENT ADVISED HIS MEDICATION WAS REFILLED ON Monday 11/07/22 WE ARE UNABLE TO REFILL MEDICATION AT THIS TIME. EARLIEST REFILL WILL BE Monday 11/14/22. PATIENT ADVISED HE SHOULD ONLY BE TAKING 2 TABS WITH THERAPY AND 2 TABS AFTER THERAPY IF NEEDED FOR PAIN THEN 1 TAB Q4HRS PRN FOR PAIN. PATIENT STATED HE IS IN A LOT OF PAIN AND WOULD LIKE TO SPEAK WITH DR PITTS REAGRDING HIS PAIN AND PAIN MEDICATION.  PATIENT ADVISED I WILL ROUTE MESSAGE TO DR PITTS AND HE WILL RETURN HIS CALL.

## 2022-11-09 NOTE — TELEPHONE ENCOUNTER
Discussed pain medication regimen as noted in Carmen's note. Will use ibuprofen between doses. Take 2 around therapy only and continue to wean.     Electronically signed by Shantanu Mendez MD, 11/09/22, 12:52 PM EST.

## 2022-11-15 ENCOUNTER — TELEPHONE (OUTPATIENT)
Dept: ORTHOPEDIC SURGERY | Facility: CLINIC | Age: 42
End: 2022-11-15

## 2022-11-15 DIAGNOSIS — Z96.641 STATUS POST TOTAL REPLACEMENT OF RIGHT HIP: ICD-10-CM

## 2022-11-15 RX ORDER — HYDROCODONE BITARTRATE AND ACETAMINOPHEN 5; 325 MG/1; MG/1
1-2 TABLET ORAL EVERY 4 HOURS PRN
Qty: 30 TABLET | Refills: 0 | Status: SHIPPED | OUTPATIENT
Start: 2022-11-15 | End: 2022-11-21 | Stop reason: SDUPTHER

## 2022-11-21 ENCOUNTER — TELEPHONE (OUTPATIENT)
Dept: ORTHOPEDIC SURGERY | Facility: CLINIC | Age: 42
End: 2022-11-21

## 2022-11-21 DIAGNOSIS — Z96.641 STATUS POST TOTAL REPLACEMENT OF RIGHT HIP: ICD-10-CM

## 2022-11-21 RX ORDER — HYDROCODONE BITARTRATE AND ACETAMINOPHEN 5; 325 MG/1; MG/1
1 TABLET ORAL EVERY 6 HOURS PRN
Qty: 30 TABLET | Refills: 0 | Status: SHIPPED | OUTPATIENT
Start: 2022-11-21 | End: 2022-11-30 | Stop reason: SDUPTHER

## 2022-11-25 NOTE — H&P (VIEW-ONLY)
"Chief Complaint  EGD (Vomitting daily x6 months)    Subjective          Vasiliy Van presents to [facility]  Patient presents today on referral from Yajaira Conklin for nausea and vomiting x6 months.  Patient reports that he is vomiting mostly in the morning, reports that yellow in color.  Reports when he lies flat in the bed at night he has coughing spells.  Admits to GERD symptoms.  Patient was started on Carafate 4X/day, reports that it has improved his symptoms some.  Denies any abdominal pain or dysphagia.  Patient does report report feeling full before finishing meals.  Denies any family history of esophageal or stomach cancer.  No previous EGD.      Objective   Vital Signs:   Ht 190.5 cm (75\")   Wt 93.6 kg (206 lb 6.4 oz)   BMI 25.80 kg/m²     Physical Exam  Constitutional:       Appearance: Normal appearance.   Pulmonary:      Effort: Pulmonary effort is normal.   Abdominal:      General: Abdomen is flat.      Palpations: Abdomen is soft.   Neurological:      Mental Status: He is alert.        Result Review :                 Assessment and Plan    Diagnoses and all orders for this visit:    1. Bilious vomiting with nausea (Primary)    2. Gastroesophageal reflux disease without esophagitis        Follow Up   Return for EGD with Dr. Mejia on 6/22/21.  Patient was given instructions and counseling regarding his condition or for health maintenance advice. Please see specific information pulled into the AVS if appropriate.       "
no

## 2022-11-30 ENCOUNTER — OFFICE VISIT (OUTPATIENT)
Dept: ORTHOPEDIC SURGERY | Facility: CLINIC | Age: 42
End: 2022-11-30

## 2022-11-30 VITALS — BODY MASS INDEX: 27.59 KG/M2 | WEIGHT: 215 LBS | HEIGHT: 74 IN

## 2022-11-30 DIAGNOSIS — M25.551 RIGHT HIP PAIN: ICD-10-CM

## 2022-11-30 DIAGNOSIS — Z96.641 STATUS POST TOTAL REPLACEMENT OF RIGHT HIP: ICD-10-CM

## 2022-11-30 DIAGNOSIS — Z96.641 S/P HIP REPLACEMENT, RIGHT: Primary | ICD-10-CM

## 2022-11-30 DIAGNOSIS — Z96.641 STATUS POST TOTAL REPLACEMENT OF RIGHT HIP: Primary | ICD-10-CM

## 2022-11-30 PROCEDURE — 99024 POSTOP FOLLOW-UP VISIT: CPT | Performed by: PHYSICIAN ASSISTANT

## 2022-11-30 RX ORDER — HYDROCODONE BITARTRATE AND ACETAMINOPHEN 5; 325 MG/1; MG/1
1 TABLET ORAL EVERY 6 HOURS PRN
Qty: 30 TABLET | Refills: 0 | Status: SHIPPED | OUTPATIENT
Start: 2022-11-30

## 2022-11-30 NOTE — PROGRESS NOTES
"Chief Complaint  Follow-up of the Right Hip    Subjective          Vasiliy Van presents to [facility]   History of Present Illness    Vasiliy Van presents today for a follow-up of his right hip.  Patient is 6 weeks postop right total hip arthroplasty.  Today, patient states that he is doing fine.  He states that he has not yet started outpatient physical therapy and has not had home physical therapy.  He has been doing his home exercises and reports improvements in his range of motion.  He states that he only gets pain occasionally to his groin.  He states his incision is well-healed without concerns.  He has continued taking narcotic medication every 6 hours.  He denies new injuries.  Denies numbness or tingling.  Patient explains that he is using the restroom frequently and feels that he is unable to empty his bladder fully.  He denies pain with urination.      Allergies   Allergen Reactions   • Bupropion Seizure        Social History     Socioeconomic History   • Marital status: Legally    Tobacco Use   • Smoking status: Every Day     Packs/day: 1.00     Years: 15.00     Pack years: 15.00     Types: Cigarettes   • Smokeless tobacco: Never   • Tobacco comments:     Last smoked 5am 10/20   Vaping Use   • Vaping Use: Never used   Substance and Sexual Activity   • Alcohol use: Never   • Drug use: Never   • Sexual activity: Defer        I reviewed the patient's chief complaint, history of present illness, review of systems, past medical history, surgical history, family history, social history, medications, and allergy list.     REVIEW OF SYSTEMS    Constitutional: Denies fevers, chills, weight loss  Cardiovascular: Denies chest pain, shortness of breath  Skin: Denies rashes, acute skin changes  Neurologic: Denies headache, loss of consciousness  MSK: Right hip pain      Objective   Vital Signs:   Ht 188 cm (74\")   Wt 97.5 kg (215 lb)   BMI 27.60 kg/m²   "   Body mass index is 27.6 kg/m².    Physical Exam    General: Alert. No acute distress.   Gait: Antalgic favoring right.  Right lower extremity: Well-healed incision.  Skin tears are well-healed.  No concerning signs for infection.  No pain with passive hip range of motion.  Hip flexion intact.  5 out of 5 hip abduction.  Internal rotation 20.  External rotation 10.  Knee extensor mechanism intact.  Calf soft, nontender.  Demonstrates active ankle plantarflexion and dorsiflexion.  Sensation intact over the dorsal and plantar foot.  Palpable pedal pulses.    Procedures    Imaging Results (Most Recent)     Procedure Component Value Units Date/Time    XR Hip With or Without Pelvis 2 - 3 View Right [[id number]] Resulted: 11/30/22 1433     Updated: 11/30/22 1434    Narrative:      Indications: Follow-up right total hip arthroplasty    Views: AP and frog lateral right hip    Findings: Press-fit right total hip arthroplasty implants in place and   stable.  Single acetabular screw remains in place.  No hardware loosening   or complications.  Hip is reduced.    Comparative Data: Comparative data found and reviewed today                   Assessment and Plan    Diagnoses and all orders for this visit:    1. S/P hip replacement, right (Primary)    2. Right hip pain  -     XR Hip With or Without Pelvis 2 - 3 View Right    3. Status post total replacement of right hip        Vasiliy Van presents today 6 weeks postop right total hip arthroplasty.  X-rays reviewed with the patient today.  Incision is well-healed.  No concerning signs for infection.  Patient is instructed to begin formal physical therapy.  We discussed the importance of this in order to improve his range of motion and his strength.  Patient expressed understanding.  Norco refilled today.  We discussed the importance of weaning from narcotic medications.  Patient expressed understanding.      Patient will follow up in 6 weeks for reevaluation.  We will  obtain new x-rays of the right hip at next visit.        Call or return if symptoms worsen or patient has any concerns.       Follow Up   Return in about 6 weeks (around 1/11/2023).  Patient was given instructions and counseling regarding his condition or for health maintenance advice. Please see specific information pulled into the AVS if appropriate.     Rosa Munoz PA-C  11/30/22  14:34 EST

## 2022-11-30 NOTE — TELEPHONE ENCOUNTER
Patient notified norco was refilled and this will be his last narcotic script next medication script will be anti-inflammatory and OTC tylenol. Patient verbalized understanding.

## 2023-05-08 ENCOUNTER — APPOINTMENT (OUTPATIENT)
Dept: GENERAL RADIOLOGY | Facility: HOSPITAL | Age: 43
End: 2023-05-08
Payer: MEDICARE

## 2023-05-08 ENCOUNTER — HOSPITAL ENCOUNTER (EMERGENCY)
Facility: HOSPITAL | Age: 43
Discharge: HOME | End: 2023-05-09
Attending: EMERGENCY MEDICINE | Admitting: EMERGENCY MEDICINE
Payer: MEDICARE

## 2023-05-08 DIAGNOSIS — R55 SYNCOPE, UNSPECIFIED SYNCOPE TYPE: ICD-10-CM

## 2023-05-08 DIAGNOSIS — S82.832A OTHER CLOSED FRACTURE OF DISTAL END OF LEFT FIBULA, INITIAL ENCOUNTER: Primary | ICD-10-CM

## 2023-05-08 DIAGNOSIS — R06.09 DYSPNEA ON EXERTION: ICD-10-CM

## 2023-05-08 LAB
ALBUMIN SERPL-MCNC: 4.1 G/DL (ref 3.5–5.2)
ALBUMIN/GLOB SERPL: 1.5 G/DL
ALP SERPL-CCNC: 118 U/L (ref 39–117)
ALT SERPL W P-5'-P-CCNC: 23 U/L (ref 1–41)
ANION GAP SERPL CALCULATED.3IONS-SCNC: 8.9 MMOL/L (ref 5–15)
AST SERPL-CCNC: 15 U/L (ref 1–40)
BASOPHILS # BLD AUTO: 0.09 10*3/MM3 (ref 0–0.2)
BASOPHILS NFR BLD AUTO: 0.7 % (ref 0–1.5)
BILIRUB SERPL-MCNC: 0.2 MG/DL (ref 0–1.2)
BUN SERPL-MCNC: 11 MG/DL (ref 6–20)
BUN/CREAT SERPL: 8.7 (ref 7–25)
CALCIUM SPEC-SCNC: 9.5 MG/DL (ref 8.6–10.5)
CHLORIDE SERPL-SCNC: 100 MMOL/L (ref 98–107)
CO2 SERPL-SCNC: 28.1 MMOL/L (ref 22–29)
CREAT SERPL-MCNC: 1.27 MG/DL (ref 0.76–1.27)
DEPRECATED RDW RBC AUTO: 45.5 FL (ref 37–54)
EGFRCR SERPLBLD CKD-EPI 2021: 72.3 ML/MIN/1.73
EOSINOPHIL # BLD AUTO: 0.39 10*3/MM3 (ref 0–0.4)
EOSINOPHIL NFR BLD AUTO: 3.2 % (ref 0.3–6.2)
ERYTHROCYTE [DISTWIDTH] IN BLOOD BY AUTOMATED COUNT: 13.2 % (ref 12.3–15.4)
GLOBULIN UR ELPH-MCNC: 2.7 GM/DL
GLUCOSE SERPL-MCNC: 115 MG/DL (ref 65–99)
HCT VFR BLD AUTO: 38.9 % (ref 37.5–51)
HGB BLD-MCNC: 13.2 G/DL (ref 13–17.7)
HOLD SPECIMEN: NORMAL
HOLD SPECIMEN: NORMAL
IMM GRANULOCYTES # BLD AUTO: 0.04 10*3/MM3 (ref 0–0.05)
IMM GRANULOCYTES NFR BLD AUTO: 0.3 % (ref 0–0.5)
LYMPHOCYTES # BLD AUTO: 4.88 10*3/MM3 (ref 0.7–3.1)
LYMPHOCYTES NFR BLD AUTO: 40.1 % (ref 19.6–45.3)
MAGNESIUM SERPL-MCNC: 2 MG/DL (ref 1.6–2.6)
MCH RBC QN AUTO: 32.3 PG (ref 26.6–33)
MCHC RBC AUTO-ENTMCNC: 33.9 G/DL (ref 31.5–35.7)
MCV RBC AUTO: 95.1 FL (ref 79–97)
MONOCYTES # BLD AUTO: 0.76 10*3/MM3 (ref 0.1–0.9)
MONOCYTES NFR BLD AUTO: 6.3 % (ref 5–12)
NEUTROPHILS NFR BLD AUTO: 49.4 % (ref 42.7–76)
NEUTROPHILS NFR BLD AUTO: 6 10*3/MM3 (ref 1.7–7)
NRBC BLD AUTO-RTO: 0 /100 WBC (ref 0–0.2)
PLATELET # BLD AUTO: 251 10*3/MM3 (ref 140–450)
PMV BLD AUTO: 9.9 FL (ref 6–12)
POTASSIUM SERPL-SCNC: 4 MMOL/L (ref 3.5–5.2)
PROT SERPL-MCNC: 6.8 G/DL (ref 6–8.5)
RBC # BLD AUTO: 4.09 10*6/MM3 (ref 4.14–5.8)
SODIUM SERPL-SCNC: 137 MMOL/L (ref 136–145)
TROPONIN T SERPL HS-MCNC: 13 NG/L
WBC NRBC COR # BLD: 12.16 10*3/MM3 (ref 3.4–10.8)
WHOLE BLOOD HOLD COAG: NORMAL
WHOLE BLOOD HOLD SPECIMEN: NORMAL

## 2023-05-08 PROCEDURE — 84484 ASSAY OF TROPONIN QUANT: CPT | Performed by: EMERGENCY MEDICINE

## 2023-05-08 PROCEDURE — 99284 EMERGENCY DEPT VISIT MOD MDM: CPT

## 2023-05-08 PROCEDURE — 36415 COLL VENOUS BLD VENIPUNCTURE: CPT

## 2023-05-08 PROCEDURE — 85025 COMPLETE CBC W/AUTO DIFF WBC: CPT

## 2023-05-08 PROCEDURE — 93005 ELECTROCARDIOGRAM TRACING: CPT | Performed by: EMERGENCY MEDICINE

## 2023-05-08 PROCEDURE — 73610 X-RAY EXAM OF ANKLE: CPT

## 2023-05-08 PROCEDURE — 93005 ELECTROCARDIOGRAM TRACING: CPT

## 2023-05-08 PROCEDURE — 80053 COMPREHEN METABOLIC PANEL: CPT | Performed by: EMERGENCY MEDICINE

## 2023-05-08 PROCEDURE — 71045 X-RAY EXAM CHEST 1 VIEW: CPT

## 2023-05-08 PROCEDURE — 85379 FIBRIN DEGRADATION QUANT: CPT | Performed by: NURSE PRACTITIONER

## 2023-05-08 PROCEDURE — 83735 ASSAY OF MAGNESIUM: CPT | Performed by: EMERGENCY MEDICINE

## 2023-05-08 RX ORDER — SODIUM CHLORIDE 0.9 % (FLUSH) 0.9 %
10 SYRINGE (ML) INJECTION AS NEEDED
Status: DISCONTINUED | OUTPATIENT
Start: 2023-05-08 | End: 2023-05-09 | Stop reason: HOSPADM

## 2023-05-08 RX ADMIN — SODIUM CHLORIDE 1000 ML: 9 INJECTION, SOLUTION INTRAVENOUS at 23:32

## 2023-05-08 NOTE — Clinical Note
[facility] EMERGENCY ROOM  [address]  [place] [address]  Phone: [phone]    Vasiliy Van was seen and treated in our emergency department on 5/8/2023.  He may return to work on 05/11/2023.         Thank you for choosing [facility].    Virgen Roman APRN

## 2023-05-09 ENCOUNTER — APPOINTMENT (OUTPATIENT)
Dept: CT IMAGING | Facility: HOSPITAL | Age: 43
End: 2023-05-09
Payer: MEDICARE

## 2023-05-09 VITALS
BODY MASS INDEX: 31.5 KG/M2 | DIASTOLIC BLOOD PRESSURE: 72 MMHG | HEART RATE: 78 BPM | HEIGHT: 72 IN | WEIGHT: 232.59 LBS | OXYGEN SATURATION: 96 % | TEMPERATURE: 98.1 F | SYSTOLIC BLOOD PRESSURE: 96 MMHG | RESPIRATION RATE: 18 BRPM

## 2023-05-09 LAB
D DIMER PPP FEU-MCNC: 0.53 MCGFEU/ML (ref 0–0.5)
QT INTERVAL: 441 MS

## 2023-05-09 PROCEDURE — 71260 CT THORAX DX C+: CPT

## 2023-05-09 PROCEDURE — 25010000002 KETOROLAC TROMETHAMINE PER 15 MG: Performed by: NURSE PRACTITIONER

## 2023-05-09 PROCEDURE — 63710000001 ONDANSETRON ODT 4 MG TABLET DISPERSIBLE: Performed by: EMERGENCY MEDICINE

## 2023-05-09 PROCEDURE — 25510000001 IOPAMIDOL PER 1 ML: Performed by: EMERGENCY MEDICINE

## 2023-05-09 PROCEDURE — 96374 THER/PROPH/DIAG INJ IV PUSH: CPT

## 2023-05-09 RX ORDER — ONDANSETRON 4 MG/1
4 TABLET, ORALLY DISINTEGRATING ORAL ONCE
Status: COMPLETED | OUTPATIENT
Start: 2023-05-09 | End: 2023-05-09

## 2023-05-09 RX ORDER — OXYCODONE HYDROCHLORIDE AND ACETAMINOPHEN 5; 325 MG/1; MG/1
1 TABLET ORAL EVERY 6 HOURS PRN
Qty: 15 TABLET | Refills: 0 | Status: SHIPPED | OUTPATIENT
Start: 2023-05-09

## 2023-05-09 RX ORDER — KETOROLAC TROMETHAMINE 30 MG/ML
30 INJECTION, SOLUTION INTRAMUSCULAR; INTRAVENOUS ONCE
Status: COMPLETED | OUTPATIENT
Start: 2023-05-09 | End: 2023-05-09

## 2023-05-09 RX ORDER — ONDANSETRON 4 MG/1
4 TABLET, ORALLY DISINTEGRATING ORAL 4 TIMES DAILY PRN
Qty: 15 TABLET | Refills: 0 | Status: SHIPPED | OUTPATIENT
Start: 2023-05-09

## 2023-05-09 RX ORDER — OXYCODONE AND ACETAMINOPHEN 10; 325 MG/1; MG/1
1 TABLET ORAL ONCE
Status: COMPLETED | OUTPATIENT
Start: 2023-05-09 | End: 2023-05-09

## 2023-05-09 RX ORDER — IBUPROFEN 600 MG/1
600 TABLET ORAL EVERY 6 HOURS PRN
Qty: 30 TABLET | Refills: 0 | Status: SHIPPED | OUTPATIENT
Start: 2023-05-09

## 2023-05-09 RX ADMIN — SODIUM CHLORIDE 1000 ML: 9 INJECTION, SOLUTION INTRAVENOUS at 01:37

## 2023-05-09 RX ADMIN — IOPAMIDOL 100 ML: 755 INJECTION, SOLUTION INTRAVENOUS at 01:30

## 2023-05-09 RX ADMIN — KETOROLAC TROMETHAMINE 30 MG: 30 INJECTION, SOLUTION INTRAMUSCULAR; INTRAVENOUS at 02:41

## 2023-05-09 RX ADMIN — ONDANSETRON 4 MG: 4 TABLET, ORALLY DISINTEGRATING ORAL at 00:13

## 2023-05-09 RX ADMIN — OXYCODONE AND ACETAMINOPHEN 1 TABLET: 10; 325 TABLET ORAL at 00:13

## 2023-05-09 NOTE — DISCHARGE INSTRUCTIONS
Rest, ice, and elevate.  Leave your splint in place for stability.  Use your crutches for ambulation.  No weightbearing with your right leg.  Take your meds as prescribed.  Call Dr. Mendez's office today advised them of your ER visit and that he wants to follow-up with you in the office today or tomorrow for further evaluation and treatment.  Follow-up with your family doctor's office to talk about your recent shortness of breath with exertion and your syncopal episodes.  Return to the emergency department for any acutely developing neurological symptoms, any persistent chest pain, any shortness of breath, any significant increase in swelling of your right leg or foot or discoloration or any new or worse concerns.

## 2023-05-09 NOTE — ED PROVIDER NOTES
Subjective   History of Present Illness  The patient presents to the emergency department and states that they got a new dryer and washer delivered today.  He states that he was laying in the bed and when the dryer alarm went off it startled him.  He states that he jumped up rather abruptly and walked briskly to the dryer.  He states that he bent over and got close out bent over again quickly and when he raised up he states that he started to feel dizzy and feel as if he was going to pass out.  He states the next thing he knew he was falling forward and states that he landed on his lower right leg and felt a pop in the back part of his ankle.  He is neurovascular intact.  There is no deformity noted there is some mild swelling.  Tenderness.  He is able to flex and extend the ankle and toes slightly but does report increased pain with any type of movement.  He states recently that he has been having some exertional dyspnea.  He states when he has this that he does feel chest tightness.  He states this is out of the norm for him and states that normally he would be able to do activities such as climb a flight of stairs without any difficulties but states he gets 3 or 4 steps up now and has to stop and catch his breath and states that his chest will be hurting.  He denies any recent illnesses.  He states he has had no hemoptysis or chest pain at rest.  He denies any leg or calf pain.  He states he is never had any clotting disorder or previous history of blood clots.  He states he has had instances before where he is passed out to the states that he feels like he had to change positions too quickly then as well.  On exam today he is in no respiratory distress.        History provided by:  Patient and spouse   used: No        Review of Systems   Constitutional: Negative for chills and fever.   HENT: Negative for congestion, ear pain and sore throat.    Eyes: Negative for pain.   Respiratory: Positive  for shortness of breath (RECENTLY WITH EXCURSION). Negative for cough and chest tightness.    Cardiovascular: Positive for chest pain (RECNETLY WITH EXCURSION) and leg swelling.   Gastrointestinal: Negative for abdominal pain, diarrhea, nausea and vomiting.   Genitourinary: Negative for dysuria, flank pain, frequency, hematuria and urgency.   Musculoskeletal: Positive for arthralgias, gait problem and joint swelling. Negative for back pain, myalgias, neck pain and neck stiffness.   Skin: Negative for pallor, rash and wound.   Neurological: Negative for seizures and headaches.   All other systems reviewed and are negative.      Past Medical History:   Diagnosis Date   • Arthritis    • Avascular necrosis     RIGHT HIP   • GERD (gastroesophageal reflux disease)    • High blood pressure    • High cholesterol    • Insomnia    • Seizure     X1 FELT D/T BUPROPION DENIES ANY FURTHER ISSUES   • Sleep apnea     NO CPAP   • Spinal headache        Allergies   Allergen Reactions   • Bupropion Seizure       Past Surgical History:   Procedure Laterality Date   • ENDOSCOPY N/A 06/22/2021    Procedure: ESOPHAGOGASTRODUODENOSCOPY with biopsy;  Surgeon: Satish Mejia MD;  Location: [facility] ENDOSCOPY;  Service: General;  Laterality: N/A;  SLIDING HIATAL HERNIA   • HIP SURGERY Left 01/2020    Total hip replacement   • JOINT REPLACEMENT      TOTAL HIP LEFT SIDE   • LUMBAR LAMINECTOMY  2001   • TOTAL HIP ARTHROPLASTY Right 10/20/2022    Procedure: RIGHT TOTAL HIP ANTERIOR ARTHOPLASTY;  Surgeon: Shantanu Mendez MD;  Location: [facility] MAIN OR;  Service: Orthopedics;  Laterality: Right;       Family History   Problem Relation Age of Onset   • Diabetes Mother    • Heart disease Father    • Malig Hyperthermia Neg Hx        Social History     Socioeconomic History   • Marital status: Legally    Tobacco Use   • Smoking status: Every Day     Packs/day: 1.00     Years: 15.00     Pack years: 15.00     Types: Cigarettes   • Smokeless  tobacco: Never   • Tobacco comments:     Last smoked 5am 10/20   Vaping Use   • Vaping Use: Never used   Substance and Sexual Activity   • Alcohol use: Never   • Drug use: Never   • Sexual activity: Defer           Objective   Physical Exam  Vitals and nursing note reviewed.   Constitutional:       General: He is in acute distress.      Appearance: Normal appearance. He is not ill-appearing or toxic-appearing.   HENT:      Head: Normocephalic and atraumatic.      Mouth/Throat:      Mouth: Mucous membranes are moist.   Eyes:      General: No scleral icterus.     Conjunctiva/sclera: Conjunctivae normal.      Pupils: Pupils are equal, round, and reactive to light.   Cardiovascular:      Rate and Rhythm: Normal rate and regular rhythm.      Pulses: Normal pulses.   Pulmonary:      Effort: Pulmonary effort is normal. No respiratory distress.      Breath sounds: Normal breath sounds. No wheezing.   Abdominal:      General: Abdomen is flat.      Palpations: Abdomen is soft.      Tenderness: There is no abdominal tenderness. There is no guarding or rebound.   Musculoskeletal:         General: Swelling, tenderness and signs of injury present. No deformity. Normal range of motion.      Cervical back: Normal range of motion and neck supple. No rigidity or tenderness.      Right lower leg: No edema.      Left lower leg: No edema.   Lymphadenopathy:      Cervical: No cervical adenopathy.   Skin:     General: Skin is warm and dry.      Capillary Refill: Capillary refill takes less than 2 seconds.      Findings: No rash.   Neurological:      General: No focal deficit present.      Mental Status: He is alert and oriented to person, place, and time. Mental status is at baseline.   Psychiatric:         Mood and Affect: Mood normal.         Behavior: Behavior normal.         Procedures           ED Course  ED Course as of 05/09/23 0733   Tue May 09, 2023   0006 I spoke with Dr. Mendez and he will follow-up with the patient in the  office and recommends a short leg splint. [TC]      ED Course User Index  [TC] Virgen Roman APRN                                           Medical Decision Making  Dyspnea on exertion: acute illness or injury  Other closed fracture of distal end of left fibula, initial encounter: acute illness or injury  Syncope, unspecified syncope type: acute illness or injury  Amount and/or Complexity of Data Reviewed  Labs: ordered.  Radiology: ordered.  ECG/medicine tests: ordered.      Risk  Prescription drug management.          Final diagnoses:   Other closed fracture of distal end of left fibula, initial encounter   Syncope, unspecified syncope type   Dyspnea on exertion       ED Disposition  ED Disposition     ED Disposition   Discharge    Condition   Stable    Comment   --             Shantanu Mendez MD  [address] [place] [address]  [phone]    Call today  FOR FOLLOW UP    Lynn Siddiqi APRN  [address]  [facility] [address]  [phone]    Call   FOR FOLLOW UP         Medication List      New Prescriptions    ibuprofen 600 MG tablet  Commonly known as: ADVIL,MOTRIN  Take 1 tablet by mouth Every 6 (Six) Hours As Needed for Moderate Pain or Mild Pain.     ondansetron ODT 4 MG disintegrating tablet  Commonly known as: ZOFRAN-ODT  Place 1 tablet on the tongue 4 (Four) Times a Day As Needed for Nausea or Vomiting.        Changed    * oxyCODONE-acetaminophen  MG per tablet  Commonly known as: PERCOCET  Take 1 tablet by mouth Every 4 (Four) Hours As Needed for Severe Pain.  What changed: Another medication with the same name was added. Make sure you understand how and when to take each.     * oxyCODONE-acetaminophen 5-325 MG per tablet  Commonly known as: PERCOCET  Take 1 tablet by mouth Every 6 (Six) Hours As Needed for Moderate Pain.  What changed: You were already taking a medication with the same name, and this prescription was added. Make sure you understand how and when to  take each.         * This list has 2 medication(s) that are the same as other medications prescribed for you. Read the directions carefully, and ask your doctor or other care provider to review them with you.               Where to Get Your Medications      These medications were sent to [facility]/pharmacy #[id number] - [place], [place] - [address] - [phone]  - [phone]   [address] [place] [address]    Hours: 24-hours Phone: [phone]   · ibuprofen 600 MG tablet  · ondansetron ODT 4 MG disintegrating tablet  · oxyCODONE-acetaminophen 5-325 MG per tablet          Virgen Roman APRN  05/09/23 0733       Virgen Roman APRN  05/09/23 0736

## 2023-05-12 ENCOUNTER — OFFICE VISIT (OUTPATIENT)
Dept: ORTHOPEDIC SURGERY | Facility: CLINIC | Age: 43
End: 2023-05-12
Payer: MEDICARE

## 2023-05-12 VITALS — BODY MASS INDEX: 31.69 KG/M2 | HEART RATE: 88 BPM | HEIGHT: 72 IN | OXYGEN SATURATION: 97 % | WEIGHT: 234 LBS

## 2023-05-12 DIAGNOSIS — S82.831A OTHER CLOSED FRACTURE OF DISTAL END OF RIGHT FIBULA, INITIAL ENCOUNTER: Primary | ICD-10-CM

## 2023-05-12 NOTE — PROGRESS NOTES
"Chief Complaint  Pain and Initial Evaluation of the Right Fibula    Subjective          Vasiliy Van presents to [facility] for   History of Present Illness    Bobby presents today for evaluation of his right ankle.  He fainted on 5/8 and injured his right ankle during the fall.  He presented to the ED where x-rays revealed a minimally displaced distal fibula fracture.  He was placed into a splint.  He has been nonweightbearing with crutches.  He denies any pain elsewhere.  Specifically denies any pain in the right hip in which we had previously done a total hip arthroplasty.    Allergies   Allergen Reactions   • Bupropion Seizure        Social History     Socioeconomic History   • Marital status: Legally    Tobacco Use   • Smoking status: Every Day     Packs/day: 1.00     Years: 15.00     Pack years: 15.00     Types: Cigarettes   • Smokeless tobacco: Never   • Tobacco comments:     Last smoked 5am 10/20   Vaping Use   • Vaping Use: Never used   Substance and Sexual Activity   • Alcohol use: Never   • Drug use: Never   • Sexual activity: Defer        I reviewed the patient's chief complaint, history of present illness, review of systems, past medical history, surgical history, family history, social history, medications, and allergy list.     REVIEW OF SYSTEMS    Constitutional: Denies fevers, chills, weight loss  Cardiovascular: Denies chest pain, shortness of breath  Skin: Denies rashes, acute skin changes  Neurologic: Denies headache, loss of consciousness  MSK: Right ankle pain      Objective   Vital Signs:   Pulse 88   Ht 182.9 cm (72\")   Wt 106 kg (234 lb)   SpO2 97%   BMI 31.74 kg/m²     Body mass index is 31.74 kg/m².    Physical Exam    General: Alert. No acute distress.   Right lower extremity: Swelling is mild.  No wounds.  No deformity.  Nontender over the medial ankle.  Achilles intact.  Calf soft.  Sensation intact over the foot.  Palpable pedal " pulses    Procedures    Imaging Results (Most Recent)     None                   Assessment and Plan        XR Ankle 3+ View Right    Result Date: 5/9/2023  Narrative: PROCEDURE: XR ANKLE 3+ VW RIGHT  COMPARISON: None  INDICATIONS: FALL RIGHT ANKLE PAIN/SWELLING  FINDINGS:  There is an oblique fracture of the distal fibula without significant displacement.  No additional fractures are identified.  Ankle mortise appears intact.      Impression:   Oblique fracture of the distal fibula.      ADRIANA DOUGLAS MD       Electronically Signed and Approved By: ADRIANA DOUGLAS MD on 5/09/2023 at 0:03             CT Chest With Contrast Diagnostic    Result Date: 5/9/2023  Narrative: PROCEDURE: CT CHEST W CONTRAST DIAGNOSTIC  COMPARISON:  [facility], CR, XR CHEST 1 VW, 5/08/2023, 23:20.  [facility], CT, CT ABDOMEN PELVIS WO CONTRAST, 7/06/2021, 22:56.  INDICATIONS: DYSPNEA, MID CHEST PAIN  TECHNIQUE: After obtaining the patient's consent, CT images were obtained with non-ionic intravenous contrast material.   PROTOCOL:   [employer] imaging protocol performed    RADIATION:   DLP:479.6 mGy*cm   Automated exposure control was utilized to minimize radiation dose. CONTRAST: 52cc Isovue 370 I.V.  FINDINGS:  No pulmonary arterial filling defects are seen to suggest pulmonary emboli.  Main pulmonary artery is nondilated.  Heart size is normal.  There is no pericardial or pleural effusion.  There are mild dependent bilateral lower lobe ground-glass opacities, as well as linear ground-glass opacity in the left lower lobe along the major fissure.  Mild paraseptal emphysematous changes are upper lobe predominant.  No lymphadenopathy is seen in the chest.  Partially included solid organs of the upper abdomen appear unremarkable for the phase of contrast enhancement.  No acute osseous abnormality is identified.      Impression:   1. No evidence of pulmonary emboli. 2. Mild dependent bilateral lower lobe  ground-glass opacities and linear left lower lobe ground-glass opacity, likely due to atelectasis. 3. Mild emphysematous changes.     ADRIANA DOUGLAS MD       Electronically Signed and Approved By: ADRIANA DOUGLAS MD on 5/09/2023 at 2:01             XR Chest 1 View    Result Date: 5/9/2023  Narrative: PROCEDURE: XR CHEST 1 VW  COMPARISON: [facility], , CHEST AP/PA 1 VIEW, 5/29/2021, 21:01.  INDICATIONS: SYNCOPE  FINDINGS:  Lungs appear grossly clear.  No pneumothorax or large pleural effusion is seen.  Cardiomediastinal contours appear within normal limits.      Impression:   No acute cardiopulmonary abnormality is identified.       ADRIANA DOUGLAS MD       Electronically Signed and Approved By: ADRIANA DOUGLAS MD on 5/09/2023 at 0:04                Diagnoses and all orders for this visit:    1. Other closed fracture of distal end of right fibula, initial encounter (Primary)        We reviewed his imaging.  We discussed attempted nonoperative management.  His mortise is well aligned and the fracture was minimally displaced.  We will place him in a fracture boot today.  He may toe-touch weight-bear.  I would like to see him back in 1 week with repeat x-rays of the right ankle to confirm stability of the mortise.  We discussed ice, elevation, anti-inflammatory medications as needed for pain.  Boot to remain on at all times.        Call or return if worsening symptoms.    Scribed for Shantanu Mendez MD by Shantanu Mendez MD  05/12/2023   10:46 EDT         Follow Up       1 week    Patient was given instructions and counseling regarding his condition or for health maintenance advice. Please see specific information pulled into the AVS if appropriate.       I have personally performed the services described in this document as scribed by the above individual and it is both accurate and complete.     Shantanu Mendez MD  05/12/23  10:47 EDT

## 2023-05-15 ENCOUNTER — TELEPHONE (OUTPATIENT)
Dept: ORTHOPEDIC SURGERY | Facility: CLINIC | Age: 43
End: 2023-05-15
Payer: MEDICARE

## 2023-05-15 DIAGNOSIS — S82.831A OTHER CLOSED FRACTURE OF DISTAL END OF RIGHT FIBULA, INITIAL ENCOUNTER: Primary | ICD-10-CM

## 2023-05-15 RX ORDER — TRAMADOL HYDROCHLORIDE 50 MG/1
50 TABLET ORAL EVERY 4 HOURS PRN
Qty: 30 TABLET | Refills: 0 | Status: SHIPPED | OUTPATIENT
Start: 2023-05-15

## 2023-05-15 NOTE — TELEPHONE ENCOUNTER
PATIENT REQUESTING PAIN MEDS.  WAS GIVEN SOME AT ER--HAS TRIED ANTI INFLAMMATORY DOESN'T HELP. [facility] v

## 2023-05-22 ENCOUNTER — OFFICE VISIT (OUTPATIENT)
Dept: ORTHOPEDIC SURGERY | Facility: CLINIC | Age: 43
End: 2023-05-22
Payer: MEDICARE

## 2023-05-22 VITALS — HEIGHT: 74 IN | OXYGEN SATURATION: 95 % | BODY MASS INDEX: 30.03 KG/M2 | WEIGHT: 234 LBS | HEART RATE: 91 BPM

## 2023-05-22 DIAGNOSIS — M17.12 PRIMARY OSTEOARTHRITIS OF LEFT KNEE: ICD-10-CM

## 2023-05-22 DIAGNOSIS — M25.562 LEFT KNEE PAIN, UNSPECIFIED CHRONICITY: ICD-10-CM

## 2023-05-22 DIAGNOSIS — S82.831A OTHER CLOSED FRACTURE OF DISTAL END OF RIGHT FIBULA, INITIAL ENCOUNTER: Primary | ICD-10-CM

## 2023-05-22 NOTE — PROGRESS NOTES
"Chief Complaint  Follow-up and Pain of the Right Ankle and Initial Evaluation of the Left Knee    Subjective          Vasiliy Van presents to [facility] for   History of Present Illness    The patient presents here today for follow up evaluation of the right ankle. The patient have been treating his right ankle fracture conservatively. He is wearing a CAM walker boot and ambulating with crutches. He is overall doing okay.   He reports left knee pain today.   Allergies   Allergen Reactions   • Bupropion Seizure        Social History     Socioeconomic History   • Marital status: Legally    Tobacco Use   • Smoking status: Every Day     Packs/day: 1.00     Years: 15.00     Pack years: 15.00     Types: Cigarettes   • Smokeless tobacco: Never   • Tobacco comments:     Last smoked 5am 10/20   Vaping Use   • Vaping Use: Never used   Substance and Sexual Activity   • Alcohol use: Never   • Drug use: Never   • Sexual activity: Defer        I reviewed the patient's chief complaint, history of present illness, review of systems, past medical history, surgical history, family history, social history, medications, and allergy list.     REVIEW OF SYSTEMS    Constitutional: Denies fevers, chills, weight loss  Cardiovascular: Denies chest pain, shortness of breath  Skin: Denies rashes, acute skin changes  Neurologic: Denies headache, loss of consciousness  MSK: Right ankle pain and left knee pain      Objective   Vital Signs:   Pulse 91   Ht 188 cm (74\")   Wt 106 kg (234 lb)   SpO2 95%   BMI 30.04 kg/m²     Body mass index is 30.04 kg/m².    Physical Exam    General: Alert. No acute distress.   Left knee- Full extension. Flexion 120. Mild crepitus. Stable to varus/valgus stress. Stable to anterior/posterior drawer. Negative Lachman's. Positive EHL, FHL, GS and TA. Sensation intact to all 5 nerves of the foot. Positive pulses. No joint line tenderness.  No pain with Link's. "     Right ankle- Positive EHL, FHL, GS and TA. Sensation intact to all 5 nerves of the foot. Positive pulses. No wounds. Tender to the lateral malleolus. No medial tenderness. Non-tender to the syndesmosis. Intact ankle of and dorsiflexion.    Procedures    Imaging Results (Most Recent)     Procedure Component Value Units Date/Time    XR Knee 4+ View Left [[id number]] Resulted: 05/22/23 1013     Updated: 05/22/23 1013    Narrative:      Indications: Left knee pain    Views: Weightbearing AP, PA flexion, lateral, sunrise left knee    Findings: No fractures noted.  Mild patellofemoral degenerative changes.    Articular height otherwise well-maintained.  Neutral alignment.    Comparative Data: No comparative data available      XR Ankle 3+ View Right [[id number]] Resulted: 05/22/23 1012     Updated: 05/22/23 1013    Narrative:      Indications: Follow-up right ankle fracture    Views: AP, oblique, lateral right ankle    Findings: Nondisplaced lateral malleolus fracture again seen.  Alignment   stable.  Ankle mortise reduced.    Comparative Data: Comparative data found and reviewed today                     Assessment and Plan        XR Knee 4+ View Left    Result Date: 5/22/2023  Narrative: Indications: Left knee pain Views: Weightbearing AP, PA flexion, lateral, sunrise left knee Findings: No fractures noted.  Mild patellofemoral degenerative changes.  Articular height otherwise well-maintained.  Neutral alignment. Comparative Data: No comparative data available     XR Ankle 3+ View Right    Result Date: 5/22/2023  Narrative: Indications: Follow-up right ankle fracture Views: AP, oblique, lateral right ankle Findings: Nondisplaced lateral malleolus fracture again seen.  Alignment stable.  Ankle mortise reduced. Comparative Data: Comparative data found and reviewed today     XR Ankle 3+ View Right    Result Date: 5/9/2023  Narrative: PROCEDURE: XR ANKLE 3+ VW RIGHT  COMPARISON: None  INDICATIONS: FALL RIGHT ANKLE  PAIN/SWELLING  FINDINGS:  There is an oblique fracture of the distal fibula without significant displacement.  No additional fractures are identified.  Ankle mortise appears intact.      Impression:   Oblique fracture of the distal fibula.      ADRIANA DOUGLSA MD       Electronically Signed and Approved By: ADRIANA DOUGLAS MD on 5/09/2023 at 0:03             CT Chest With Contrast Diagnostic    Result Date: 5/9/2023  Narrative: PROCEDURE: CT CHEST W CONTRAST DIAGNOSTIC  COMPARISON:  [facility], CR, XR CHEST 1 VW, 5/08/2023, 23:20.  [facility], CT, CT ABDOMEN PELVIS WO CONTRAST, 7/06/2021, 22:56.  INDICATIONS: DYSPNEA, MID CHEST PAIN  TECHNIQUE: After obtaining the patient's consent, CT images were obtained with non-ionic intravenous contrast material.   PROTOCOL:   [employer] imaging protocol performed    RADIATION:   DLP:479.6 mGy*cm   Automated exposure control was utilized to minimize radiation dose. CONTRAST: 52cc Isovue 370 I.V.  FINDINGS:  No pulmonary arterial filling defects are seen to suggest pulmonary emboli.  Main pulmonary artery is nondilated.  Heart size is normal.  There is no pericardial or pleural effusion.  There are mild dependent bilateral lower lobe ground-glass opacities, as well as linear ground-glass opacity in the left lower lobe along the major fissure.  Mild paraseptal emphysematous changes are upper lobe predominant.  No lymphadenopathy is seen in the chest.  Partially included solid organs of the upper abdomen appear unremarkable for the phase of contrast enhancement.  No acute osseous abnormality is identified.      Impression:   1. No evidence of pulmonary emboli. 2. Mild dependent bilateral lower lobe ground-glass opacities and linear left lower lobe ground-glass opacity, likely due to atelectasis. 3. Mild emphysematous changes.     ADRIANA DOUGLAS MD       Electronically Signed and Approved By: ADRIANA DOUGLAS MD on 5/09/2023 at 2:01             XR  Chest 1 View    Result Date: 5/9/2023  Narrative: PROCEDURE: XR CHEST 1 VW  COMPARISON: [facility], CR, CHEST AP/PA 1 VIEW, 5/29/2021, 21:01.  INDICATIONS: SYNCOPE  FINDINGS:  Lungs appear grossly clear.  No pneumothorax or large pleural effusion is seen.  Cardiomediastinal contours appear within normal limits.      Impression:   No acute cardiopulmonary abnormality is identified.       ADRIANA DOUGLAS MD       Electronically Signed and Approved By: ADRIANA DOUGLAS MD on 5/09/2023 at 0:04                Diagnoses and all orders for this visit:    1. Other closed fracture of distal end of right fibula, initial encounter (Primary)  -     XR Ankle 3+ View Right    2. Left knee pain, unspecified chronicity  -     XR Knee 4+ View Left    3. Primary osteoarthritis of left knee        Discussed the treatment plan with the patient.  I reviewed the x-rays that were obtained today with the patient. Plan for conservative treatment at this time. Plan to continue CAM walker boot. Plan to remain toe touch weight bearing. Discussed ice and elevation for swelling.      Repeat right ankle x-rays    Educated on risk of smoking. Discussed options for smoking cessation. and Call or return if worsening symptoms.    Scribed for Shantanu Mendez MD by Marilin Mohamud  05/22/2023   10:03 EDT         Follow Up     2 weeks     Patient was given instructions and counseling regarding his condition or for health maintenance advice. Please see specific information pulled into the AVS if appropriate.       I have personally performed the services described in this document as scribed by the above individual and it is both accurate and complete.     Shantanu Mendez MD  05/22/23  10:23 EDT

## 2023-06-07 ENCOUNTER — OFFICE VISIT (OUTPATIENT)
Dept: ORTHOPEDIC SURGERY | Facility: CLINIC | Age: 43
End: 2023-06-07
Payer: MEDICARE

## 2023-06-07 VITALS
SYSTOLIC BLOOD PRESSURE: 142 MMHG | HEART RATE: 90 BPM | OXYGEN SATURATION: 96 % | DIASTOLIC BLOOD PRESSURE: 90 MMHG | HEIGHT: 74 IN | WEIGHT: 234 LBS | BODY MASS INDEX: 30.03 KG/M2

## 2023-06-07 DIAGNOSIS — M25.571 RIGHT ANKLE PAIN, UNSPECIFIED CHRONICITY: ICD-10-CM

## 2023-06-07 DIAGNOSIS — S82.831A OTHER CLOSED FRACTURE OF DISTAL END OF RIGHT FIBULA, INITIAL ENCOUNTER: Primary | ICD-10-CM

## 2023-06-07 NOTE — PROGRESS NOTES
"Chief Complaint  Follow-up of the Right Ankle    Subjective          Vasiliy Van presents to [facility]   History of Present Illness    Vasiliy aVn presents today for a follow-up of his right ankle.  Patient has a right distal fibula fracture that we have been treating conservatively.  Today, he states that he is doing fine.  He reports continued swelling to his ankle.  Is been working on range of motion at home.  He states that he continues with the boot and has been weightbearing as tolerated in the boot the majority of the time.  He denies new injuries.  He reports some numbness and tingling at times.      Allergies   Allergen Reactions    Bupropion Seizure        Social History     Socioeconomic History    Marital status: Legally    Tobacco Use    Smoking status: Every Day     Packs/day: 1.00     Years: 15.00     Pack years: 15.00     Types: Cigarettes    Smokeless tobacco: Never    Tobacco comments:     Last smoked 5am 10/20   Vaping Use    Vaping Use: Never used   Substance and Sexual Activity    Alcohol use: Never    Drug use: Never    Sexual activity: Defer        I reviewed the patient's chief complaint, history of present illness, review of systems, past medical history, surgical history, family history, social history, medications, and allergy list.     REVIEW OF SYSTEMS    Constitutional: Denies fevers, chills, weight loss  Cardiovascular: Denies chest pain, shortness of breath  Skin: Denies rashes, acute skin changes  Neurologic: Denies headache, loss of consciousness  MSK: Right ankle pain      Objective   Vital Signs:   /90 (BP Location: Right arm, Patient Position: Sitting)   Pulse 90   Ht 188 cm (74\")   Wt 106 kg (234 lb)   SpO2 96%   BMI 30.04 kg/m²     Body mass index is 30.04 kg/m².    Physical Exam    General: Alert. No acute distress.   Right lower extremity: Tenderness to palpation over the anterior lateral ligaments.  Slight " tenderness to palpation over the medial malleolus and lateral malleolus.  Mild swelling.  Ankle stable ligamentous stress.  Calf soft, nontender.  Achilles intact.  Demonstrates active ankle plantarflexion and dorsiflexion with associated stiffness.  Toe range of motion intact.  Sensation intact over the dorsal and plantar foot.  Palpable pulses.    Procedures    Imaging Results (Most Recent)       Procedure Component Value Units Date/Time    XR Ankle 3+ View Right [[id number]] Resulted: 06/07/23 1011     Updated: 06/07/23 1012    Narrative:      Indications: Follow-up right distal fibula fracture    Views: AP, oblique, lateral right ankle    Findings: Right distal fibula fracture is seen.  Fracture alignment is   stable.  Increase in callus formation about the fracture site.  Talus is   well reduced beneath the tibia.    Comparative Data: Comparative data found and reviewed today.                   Assessment and Plan    Diagnoses and all orders for this visit:    1. Other closed fracture of distal end of right fibula, initial encounter (Primary)    2. Right ankle pain, unspecified chronicity  -     XR Ankle 3+ View Right        Vasiliy Van presents today for follow-up of his right distal fibula fracture that we have been treating conservatively.  X-rays reviewed with the patient and family members today.  Patient will continue cam walker boot.  Okay to continue full weightbearing as tolerated while in the boot.  Continue with ankle range of motion exercises.  Use ice and elevation as needed for inflammation.      Patient will follow up in 3 weeks for reevaluation.  We will obtain new x-rays of the right ankle at next visit.      Call or return if symptoms worsen or patient has any concerns.       Follow Up   Return in about 3 weeks (around 6/28/2023).  Patient was given instructions and counseling regarding his condition or for health maintenance advice. Please see specific information pulled into the AVS  if appropriate.     Rosa Munoz PA-C  06/07/23  10:15 EDT

## 2023-06-14 ENCOUNTER — TRANSCRIBE ORDERS (OUTPATIENT)
Dept: ADMINISTRATIVE | Facility: HOSPITAL | Age: 43
End: 2023-06-14
Payer: MEDICARE

## 2023-06-14 ENCOUNTER — HOSPITAL ENCOUNTER (OUTPATIENT)
Dept: CT IMAGING | Facility: HOSPITAL | Age: 43
Discharge: HOME | End: 2023-06-14
Payer: MEDICARE

## 2023-06-14 DIAGNOSIS — R55 SYNCOPE AND COLLAPSE: ICD-10-CM

## 2023-06-14 DIAGNOSIS — R06.02 SOB (SHORTNESS OF BREATH): ICD-10-CM

## 2023-06-14 DIAGNOSIS — R06.02 SOB (SHORTNESS OF BREATH): Primary | ICD-10-CM

## 2023-06-14 PROCEDURE — 25510000001 IOPAMIDOL PER 1 ML

## 2023-06-14 PROCEDURE — 71260 CT THORAX DX C+: CPT

## 2023-06-14 RX ADMIN — IOPAMIDOL 100 ML: 755 INJECTION, SOLUTION INTRAVENOUS at 19:39

## 2023-06-15 ENCOUNTER — LAB (OUTPATIENT)
Dept: LAB | Facility: HOSPITAL | Age: 43
End: 2023-06-15
Payer: MEDICARE

## 2023-06-15 ENCOUNTER — TRANSCRIBE ORDERS (OUTPATIENT)
Dept: LAB | Facility: HOSPITAL | Age: 43
End: 2023-06-15
Payer: MEDICARE

## 2023-06-15 DIAGNOSIS — F32.9 MAJOR DEPRESSIVE DISORDER WITH SINGLE EPISODE, REMISSION STATUS UNSPECIFIED: ICD-10-CM

## 2023-06-15 DIAGNOSIS — E55.9 VITAMIN D DEFICIENCY: ICD-10-CM

## 2023-06-15 DIAGNOSIS — R55 FAINTING SPELL: ICD-10-CM

## 2023-06-15 DIAGNOSIS — E78.5 HYPERLIPIDEMIA, UNSPECIFIED HYPERLIPIDEMIA TYPE: ICD-10-CM

## 2023-06-15 DIAGNOSIS — D64.9 ANEMIA, UNSPECIFIED TYPE: ICD-10-CM

## 2023-06-15 DIAGNOSIS — R06.02 SHORTNESS OF BREATH: ICD-10-CM

## 2023-06-15 DIAGNOSIS — R53.83 TIREDNESS: ICD-10-CM

## 2023-06-15 DIAGNOSIS — F32.9 MAJOR DEPRESSIVE DISORDER WITH SINGLE EPISODE, REMISSION STATUS UNSPECIFIED: Primary | ICD-10-CM

## 2023-06-15 DIAGNOSIS — Z79.899 ENCOUNTER FOR LONG-TERM (CURRENT) USE OF OTHER MEDICATIONS: ICD-10-CM

## 2023-06-15 DIAGNOSIS — I10 ESSENTIAL HYPERTENSION, MALIGNANT: ICD-10-CM

## 2023-06-15 LAB
25(OH)D3 SERPL-MCNC: 24 NG/ML (ref 30–100)
ALBUMIN SERPL-MCNC: 4.3 G/DL (ref 3.5–5.2)
ALBUMIN/GLOB SERPL: 1.5 G/DL
ALP SERPL-CCNC: 130 U/L (ref 39–117)
ALT SERPL W P-5'-P-CCNC: 23 U/L (ref 1–41)
ANION GAP SERPL CALCULATED.3IONS-SCNC: 9.2 MMOL/L (ref 5–15)
AST SERPL-CCNC: 14 U/L (ref 1–40)
BASOPHILS # BLD AUTO: 0.06 10*3/MM3 (ref 0–0.2)
BASOPHILS NFR BLD AUTO: 0.7 % (ref 0–1.5)
BILIRUB SERPL-MCNC: 0.2 MG/DL (ref 0–1.2)
BILIRUB UR QL STRIP: NEGATIVE
BUN SERPL-MCNC: 9 MG/DL (ref 6–20)
BUN/CREAT SERPL: 7.8 (ref 7–25)
CALCIUM SPEC-SCNC: 10 MG/DL (ref 8.6–10.5)
CHLORIDE SERPL-SCNC: 100 MMOL/L (ref 98–107)
CHOLEST SERPL-MCNC: 166 MG/DL (ref 0–200)
CLARITY UR: CLEAR
CO2 SERPL-SCNC: 28.8 MMOL/L (ref 22–29)
COLOR UR: YELLOW
CREAT SERPL-MCNC: 1.15 MG/DL (ref 0.76–1.27)
DEPRECATED RDW RBC AUTO: 41.5 FL (ref 37–54)
EGFRCR SERPLBLD CKD-EPI 2021: 81.5 ML/MIN/1.73
EOSINOPHIL # BLD AUTO: 0.33 10*3/MM3 (ref 0–0.4)
EOSINOPHIL NFR BLD AUTO: 3.6 % (ref 0.3–6.2)
ERYTHROCYTE [DISTWIDTH] IN BLOOD BY AUTOMATED COUNT: 12 % (ref 12.3–15.4)
GLOBULIN UR ELPH-MCNC: 2.9 GM/DL
GLUCOSE SERPL-MCNC: 117 MG/DL (ref 65–99)
GLUCOSE UR STRIP-MCNC: NEGATIVE MG/DL
HBA1C MFR BLD: 6 % (ref 4.8–5.6)
HCT VFR BLD AUTO: 38.8 % (ref 37.5–51)
HDLC SERPL-MCNC: 31 MG/DL (ref 40–60)
HGB BLD-MCNC: 13.5 G/DL (ref 13–17.7)
HGB UR QL STRIP.AUTO: NEGATIVE
IMM GRANULOCYTES # BLD AUTO: 0.04 10*3/MM3 (ref 0–0.05)
IMM GRANULOCYTES NFR BLD AUTO: 0.4 % (ref 0–0.5)
IRON 24H UR-MRATE: 53 MCG/DL (ref 59–158)
IRON SATN MFR SERPL: 15 % (ref 20–50)
KETONES UR QL STRIP: NEGATIVE
LDLC SERPL CALC-MCNC: 94 MG/DL (ref 0–100)
LDLC/HDLC SERPL: 2.79 {RATIO}
LEUKOCYTE ESTERASE UR QL STRIP.AUTO: NEGATIVE
LYMPHOCYTES # BLD AUTO: 3.45 10*3/MM3 (ref 0.7–3.1)
LYMPHOCYTES NFR BLD AUTO: 37.8 % (ref 19.6–45.3)
MCH RBC QN AUTO: 32.3 PG (ref 26.6–33)
MCHC RBC AUTO-ENTMCNC: 34.8 G/DL (ref 31.5–35.7)
MCV RBC AUTO: 92.8 FL (ref 79–97)
MONOCYTES # BLD AUTO: 0.73 10*3/MM3 (ref 0.1–0.9)
MONOCYTES NFR BLD AUTO: 8 % (ref 5–12)
NEUTROPHILS NFR BLD AUTO: 4.52 10*3/MM3 (ref 1.7–7)
NEUTROPHILS NFR BLD AUTO: 49.5 % (ref 42.7–76)
NITRITE UR QL STRIP: NEGATIVE
NRBC BLD AUTO-RTO: 0 /100 WBC (ref 0–0.2)
PH UR STRIP.AUTO: 5.5 [PH] (ref 5–8)
PLATELET # BLD AUTO: 228 10*3/MM3 (ref 140–450)
PMV BLD AUTO: 10.5 FL (ref 6–12)
POTASSIUM SERPL-SCNC: 4.9 MMOL/L (ref 3.5–5.2)
PROT SERPL-MCNC: 7.2 G/DL (ref 6–8.5)
PROT UR QL STRIP: NEGATIVE
RBC # BLD AUTO: 4.18 10*6/MM3 (ref 4.14–5.8)
SODIUM SERPL-SCNC: 138 MMOL/L (ref 136–145)
SP GR UR STRIP: 1.02 (ref 1–1.03)
TIBC SERPL-MCNC: 344 MCG/DL (ref 298–536)
TRANSFERRIN SERPL-MCNC: 231 MG/DL (ref 200–360)
TRIGL SERPL-MCNC: 243 MG/DL (ref 0–150)
TSH SERPL DL<=0.05 MIU/L-ACNC: 2.71 UIU/ML (ref 0.27–4.2)
UROBILINOGEN UR QL STRIP: NORMAL
VLDLC SERPL-MCNC: 41 MG/DL (ref 5–40)
WBC NRBC COR # BLD: 9.13 10*3/MM3 (ref 3.4–10.8)

## 2023-06-15 PROCEDURE — 80053 COMPREHEN METABOLIC PANEL: CPT

## 2023-06-15 PROCEDURE — 84466 ASSAY OF TRANSFERRIN: CPT

## 2023-06-15 PROCEDURE — 83036 HEMOGLOBIN GLYCOSYLATED A1C: CPT

## 2023-06-15 PROCEDURE — 84443 ASSAY THYROID STIM HORMONE: CPT

## 2023-06-15 PROCEDURE — 85025 COMPLETE CBC W/AUTO DIFF WBC: CPT

## 2023-06-15 PROCEDURE — 82306 VITAMIN D 25 HYDROXY: CPT

## 2023-06-15 PROCEDURE — 80061 LIPID PANEL: CPT

## 2023-06-15 PROCEDURE — 83540 ASSAY OF IRON: CPT

## 2023-06-15 PROCEDURE — 36415 COLL VENOUS BLD VENIPUNCTURE: CPT

## 2023-06-15 PROCEDURE — 81003 URINALYSIS AUTO W/O SCOPE: CPT

## 2023-09-06 ENCOUNTER — OFFICE VISIT (OUTPATIENT)
Dept: ORTHOPEDIC SURGERY | Facility: CLINIC | Age: 43
End: 2023-09-06
Payer: MEDICARE

## 2023-09-06 VITALS
HEIGHT: 74 IN | BODY MASS INDEX: 30.67 KG/M2 | SYSTOLIC BLOOD PRESSURE: 92 MMHG | HEART RATE: 86 BPM | DIASTOLIC BLOOD PRESSURE: 64 MMHG | OXYGEN SATURATION: 94 % | WEIGHT: 239 LBS

## 2023-09-06 DIAGNOSIS — M75.82 ROTATOR CUFF TENDONITIS, LEFT: Primary | ICD-10-CM

## 2023-09-06 DIAGNOSIS — M87.00 AVN (AVASCULAR NECROSIS OF BONE): ICD-10-CM

## 2023-09-06 RX ORDER — LIDOCAINE HYDROCHLORIDE 10 MG/ML
5 INJECTION, SOLUTION INFILTRATION; PERINEURAL
Status: COMPLETED | OUTPATIENT
Start: 2023-09-06 | End: 2023-09-06

## 2023-09-06 RX ORDER — TRIAMCINOLONE ACETONIDE 40 MG/ML
40 INJECTION, SUSPENSION INTRA-ARTICULAR; INTRAMUSCULAR
Status: COMPLETED | OUTPATIENT
Start: 2023-09-06 | End: 2023-09-06

## 2023-09-06 RX ADMIN — TRIAMCINOLONE ACETONIDE 40 MG: 40 INJECTION, SUSPENSION INTRA-ARTICULAR; INTRAMUSCULAR at 10:47

## 2023-09-06 RX ADMIN — LIDOCAINE HYDROCHLORIDE 5 ML: 10 INJECTION, SOLUTION INFILTRATION; PERINEURAL at 10:47

## 2023-09-06 NOTE — PROGRESS NOTES
"Chief Complaint  Pain and Initial Evaluation of the Left Shoulder    Subjective          Vasiliy Van presents to [facility] for   History of Present Illness    The patient presents here today for evaluation of the left shoulder. The patient reports left shoulder pain. He reports symptoms for about 3 months. He denies injury or trauma. He reports pain \"most\" of the time. He reports his arm an hand goes numb.     Allergies   Allergen Reactions    Bupropion Seizure        Social History     Socioeconomic History    Marital status: Legally    Tobacco Use    Smoking status: Every Day     Packs/day: 1.00     Years: 15.00     Pack years: 15.00     Types: Cigarettes    Smokeless tobacco: Never    Tobacco comments:     Last smoked 5am 10/20   Vaping Use    Vaping Use: Never used   Substance and Sexual Activity    Alcohol use: Never    Drug use: Never    Sexual activity: Defer        I reviewed the patient's chief complaint, history of present illness, review of systems, past medical history, surgical history, family history, social history, medications, and allergy list.     REVIEW OF SYSTEMS    Constitutional: Denies fevers, chills, weight loss  Cardiovascular: Denies chest pain, shortness of breath  Skin: Denies rashes, acute skin changes  Neurologic: Denies headache, loss of consciousness  MSK: Left shoulder pain      Objective   Vital Signs:   BP 92/64   Pulse 86   Ht 188 cm (74\")   Wt 108 kg (239 lb)   SpO2 94%   BMI 30.69 kg/m²     Body mass index is 30.69 kg/m².    Physical Exam    General: Alert. No acute distress.   Left shoulder- Forward elevation 180, External Rotation 45. Internal rotation mid lumbar spine. Neurovascularly intact. Sensation to light touch median, radial, ulnar nerve. Positive AIN, PIN, ulnar nerve motor function. Positive pulses. Positive impingement. Pain with [place]. Pop and pain with Dynamic labral sheer testing.    Large Joint " Arthrocentesis  Date/Time: 9/6/2023 10:47 AM  Consent given by: patient  Site marked: site marked  Timeout: Immediately prior to procedure a time out was called to verify the correct patient, procedure, equipment, support staff and site/side marked as required   Supporting Documentation  Indications: pain   Procedure Details  Location: shoulder (LEFT) -   Needle gauge: 21 G.  Medications administered: 5 mL lidocaine 1 %; 40 mg triamcinolone acetonide 40 MG/ML  Patient tolerance: patient tolerated the procedure well with no immediate complications      [place] MRI- IMPRESSION:  1. Mild supraspinatus and infraspinatus tendinopathy. No evidence of rotator cuff disruption. 2. Region of avascular necrosis in the  superior aspect of the right humeral head. The bony structures are otherwise unremarkable. 3. The glenoid labrum appears intact. 4.  Ill-defined edema in the proximal latissimus dorsi muscle and insulin the adjacent fatty tissues, which could reflect a sprain or  inflammatory changes. 5. Fluid in the biceps tendon sheath suggesting tenosynovitis. The tendon is intact.    Imaging Results (Most Recent)       None                     Assessment and Plan        No results found.     Diagnoses and all orders for this visit:    1. Rotator cuff tendonitis, left (Primary)    2. AVN (avascular necrosis of bone)        Discussed the treatment plan with the patient.  I reviewed the MRI with the patient. Plan for conservative treatment at this time. Discussed the risks and benefits of a left shoulder steroid injection. The patient expressed understanding and wished to proceed. He tolerated the injection well. Home exercises given today.       Educated on risk of smoking. Discussed options for smoking cessation. and Call or return if worsening symptoms.    Scribed for Shantanu Mendez MD by Marilin Mohamud  09/06/2023   10:35 EDT         Follow Up       3 months     Patient was given instructions and counseling  regarding his condition or for health maintenance advice. Please see specific information pulled into the AVS if appropriate.       I have personally performed the services described in this document as scribed by the above individual and it is both accurate and complete.     Shantanu Mendez MD  09/06/23  10:45 EDT

## 2023-09-29 PROBLEM — R06.02 SHORTNESS OF BREATH: Status: ACTIVE | Noted: 2023-09-29

## 2023-09-29 PROBLEM — I10 ESSENTIAL HYPERTENSION: Status: ACTIVE | Noted: 2023-09-29

## 2023-09-29 PROBLEM — E78.5 DYSLIPIDEMIA: Status: ACTIVE | Noted: 2023-09-29

## 2023-09-29 PROBLEM — G47.33 OBSTRUCTIVE SLEEP APNEA: Status: ACTIVE | Noted: 2023-09-29

## 2023-09-29 PROBLEM — R55 SYNCOPE AND COLLAPSE: Status: ACTIVE | Noted: 2023-09-29

## 2023-09-29 NOTE — PROGRESS NOTES
Chief Complaint  Establish Care, Loss of Consciousness, Hypertension, and Hyperlipidemia      History of Present Illness  Vasiliy Van presents to [facility]  Patient is a 43-year-old with a prior history of essential hypertension dyslipidemia and history of sleep apnea who has had in last few months episodes of presyncope and 1 syncopal episode that resulted in a fracture of his leg.  The first episode occurred when the patient was standing he felt hot presyncopal and then passed out.  Subsequent episodes have occurred when he is getting out of a chair or bending over to pick something up.  He denies any chest discomfort but has noticed some dyspnea on exertion.  No PND orthopnea lower extremity edema.  He has not had any symptomatic sustained tachycardic spells    Past Medical History:   Diagnosis Date    Arthritis     Avascular necrosis     RIGHT HIP    GERD (gastroesophageal reflux disease)     High blood pressure     High cholesterol     Insomnia     Seizure     X1 FELT D/T BUPROPION DENIES ANY FURTHER ISSUES    Sleep apnea     NO CPAP    Spinal headache          Current Outpatient Medications:     ARIPiprazole (ABILIFY) 2 MG tablet, Take 1 tablet by mouth Daily., Disp: , Rfl:     atorvastatin (LIPITOR) 20 MG tablet, Take 1 tablet by mouth Daily., Disp: , Rfl:     Levomilnacipran HCl ER (Fetzima) 120 MG capsule sustained-release 24 hr, Take 1 tablet by mouth Daily., Disp: , Rfl:     lisinopril (PRINIVIL,ZESTRIL) 20 MG tablet, Take 1 tablet by mouth Daily., Disp: 90 tablet, Rfl: 3    Medications Discontinued During This Encounter   Medication Reason    diclofenac (VOLTAREN) 50 MG EC tablet *Therapy completed    FeroSul 325 (65 Fe) MG tablet *Therapy completed    ibuprofen (ADVIL,MOTRIN) 600 MG tablet *Therapy completed    traZODone (DESYREL) 100 MG tablet *Therapy completed    lisinopril (PRINIVIL,ZESTRIL) 30 MG tablet      Allergies   Allergen Reactions    Bupropion Seizure  "       Social History     Tobacco Use    Smoking status: Every Day     Packs/day: 1.00     Years: 15.00     Pack years: 15.00     Types: Cigarettes    Smokeless tobacco: Never    Tobacco comments:     Last smoked 5am 10/20   Vaping Use    Vaping Use: Never used   Substance Use Topics    Alcohol use: Never    Drug use: Never       Family History   Problem Relation Age of Onset    Diabetes Mother     Heart disease Father     Malig Hyperthermia Neg Hx         Objective     /71   Pulse 100   Ht 188 cm (74\")   Wt 106 kg (234 lb)   BMI 30.04 kg/m²       Physical Exam    General Appearance:   no acute distress  Alert and oriented x3  HENT:   lips not cyanotic  Atraumatic  Neck:  No jvd   supple  Respiratory:  no respiratory distress  normal breath sounds  no rales  Cardiovascular:  Regular rate and rhythm  no S3, no S4   no murmur  no rub  Extremities  No cyanosis  lower extremity edema: none    Skin:   warm, dry  No rashes    Result Review :     No results found for: PROBNP  CMP          10/21/2022    04:03 5/8/2023    23:06 6/15/2023    08:30   CMP   Glucose 123  115  117    BUN 12  11  9    Creatinine 0.95  1.27  1.15    EGFR 102.5  72.3  81.5    Sodium 135  137  138    Potassium 4.3  4.0  4.9    Chloride 100  100  100    Calcium 8.6  9.5  10.0    Total Protein  6.8  7.2    Albumin  4.1  4.3    Globulin  2.7  2.9    Total Bilirubin  0.2  0.2    Alkaline Phosphatase  118  130    AST (SGOT)  15  14    ALT (SGPT)  23  23    Albumin/Globulin Ratio  1.5  1.5    BUN/Creatinine Ratio 12.6  8.7  7.8    Anion Gap 6.1  8.9  9.2      CBC w/diff          10/21/2022    04:03 5/8/2023    23:06 6/15/2023    08:30   CBC w/Diff   WBC 13.97  12.16  9.13    RBC 3.05  4.09  4.18    Hemoglobin 9.9  13.2  13.5    Hematocrit 28.8  38.9  38.8    MCV 94.4  95.1  92.8    MCH 32.5  32.3  32.3    MCHC 34.4  33.9  34.8    RDW 12.5  13.2  12.0    Platelets 189  251  228    Neutrophil Rel % 66.6  49.4  49.5    Immature Granulocyte Rel % 0.4 "  0.3  0.4    Lymphocyte Rel % 22.7  40.1  37.8    Monocyte Rel % 9.3  6.3  8.0    Eosinophil Rel % 0.6  3.2  3.6    Basophil Rel % 0.4  0.7  0.7       Lab Results   Component Value Date    TSH 2.710 06/15/2023      No results found for: FREET4   D-Dimer, Quantitative   Date Value Ref Range Status   05/08/2023 0.53 (H) 0.00 - 0.50 MCGFEU/mL Final     Magnesium   Date Value Ref Range Status   05/08/2023 2.0 1.6 - 2.6 mg/dL Final      No results found for: DIGOXIN   Lab Results   Component Value Date    TROPONINT 13 05/08/2023      Lab Results   Component Value Date    POCTROP 0.00 05/29/2021   (       Lipid Panel          6/15/2023    08:30   Lipid Panel   Total Cholesterol 166    Triglycerides 243    HDL Cholesterol 31    VLDL Cholesterol 41    LDL Cholesterol  94    LDL/HDL Ratio 2.79           ECG 12 Lead    Date/Time: 10/4/2023 4:26 PM  Performed by: Vinod Fenton MD  Authorized by: Vinod Fenton MD   Comparison: compared with previous ECG   Similar to previous ECG  Rhythm: sinus tachycardia  Comments: Borderline QT prolongation       No results found for this or any previous visit.             The 10-year ASCVD risk score (La FLOWER, et al., 2019) is: 6.5%    Values used to calculate the score:      Age: 43 years      Sex: Male      Is Non- : No      Diabetic: No      Tobacco smoker: Yes      Systolic Blood Pressure: 120 mmHg      Is BP treated: Yes      HDL Cholesterol: 31 mg/dL      Total Cholesterol: 166 mg/dL     Diagnoses and all orders for this visit:    1. Syncope and collapse (Primary)  Assessment & Plan:  Patient's episode sound very consistent with orthostasis recommended down titration of his lisinopril to 20 mg once a day as well as encourage fluid hydration and compression socks.  In addition getting over-the-counter measures such as setting and elevating his legs to help abort episodes.  We will get a 24-hour Holter monitor and echocardiogram to evaluate for any evidence of  dysrhythmias or LV dysfunction    Orders:  -     Adult Transthoracic Echo Complete W/ Cont if Necessary Per Protocol; Future  -     Holter Monitor - 48 Hour; Future    2. Shortness of breath  Assessment & Plan:  Increase shortness of breath symptoms checking echocardiogram and proBNP level to evaluate for systolic or diastolic dysfunction    Orders:  -     proBNP; Future  -     Treadmill Stress Test; Future    3. Essential hypertension  Assessment & Plan:  Well-controlled on the low normal side decrease lisinopril to 20 encourage patient keep a home blood pressure log      Other orders  -     lisinopril (PRINIVIL,ZESTRIL) 20 MG tablet; Take 1 tablet by mouth Daily.  Dispense: 90 tablet; Refill: 3            Follow Up     Return in about 6 months (around 4/4/2024) for Follow with Rose Noriega.          Patient was given instructions and counseling regarding his condition or for health maintenance advice. Please see specific information pulled into the AVS if appropriate.

## 2023-10-04 ENCOUNTER — OFFICE VISIT (OUTPATIENT)
Dept: CARDIOLOGY | Facility: CLINIC | Age: 43
End: 2023-10-04
Payer: MEDICARE

## 2023-10-04 VITALS
HEART RATE: 100 BPM | DIASTOLIC BLOOD PRESSURE: 71 MMHG | HEIGHT: 74 IN | BODY MASS INDEX: 30.03 KG/M2 | SYSTOLIC BLOOD PRESSURE: 120 MMHG | WEIGHT: 234 LBS

## 2023-10-04 DIAGNOSIS — R55 SYNCOPE AND COLLAPSE: Primary | ICD-10-CM

## 2023-10-04 DIAGNOSIS — I10 ESSENTIAL HYPERTENSION: ICD-10-CM

## 2023-10-04 DIAGNOSIS — R06.02 SHORTNESS OF BREATH: ICD-10-CM

## 2023-10-04 PROCEDURE — 3078F DIAST BP <80 MM HG: CPT | Performed by: INTERNAL MEDICINE

## 2023-10-04 PROCEDURE — 3074F SYST BP LT 130 MM HG: CPT | Performed by: INTERNAL MEDICINE

## 2023-10-04 PROCEDURE — 99204 OFFICE O/P NEW MOD 45 MIN: CPT | Performed by: INTERNAL MEDICINE

## 2023-10-04 RX ORDER — LISINOPRIL 20 MG/1
20 TABLET ORAL DAILY
Qty: 90 TABLET | Refills: 3 | Status: SHIPPED | OUTPATIENT
Start: 2023-10-04

## 2023-10-04 NOTE — ASSESSMENT & PLAN NOTE
Well-controlled on the low normal side decrease lisinopril to 20 encourage patient keep a home blood pressure log

## 2023-10-04 NOTE — ASSESSMENT & PLAN NOTE
Increase shortness of breath symptoms checking echocardiogram and proBNP level to evaluate for systolic or diastolic dysfunction

## 2023-10-04 NOTE — ASSESSMENT & PLAN NOTE
Patient's episode sound very consistent with orthostasis recommended down titration of his lisinopril to 20 mg once a day as well as encourage fluid hydration and compression socks.  In addition getting over-the-counter measures such as setting and elevating his legs to help abort episodes.  We will get a 24-hour Holter monitor and echocardiogram to evaluate for any evidence of dysrhythmias or LV dysfunction

## 2023-12-05 ENCOUNTER — TELEPHONE (OUTPATIENT)
Dept: CARDIOLOGY | Facility: CLINIC | Age: 43
End: 2023-12-05
Payer: MEDICARE

## 2023-12-05 NOTE — TELEPHONE ENCOUNTER
----- Message from JOSEPHINE Peres sent at 12/4/2023  3:59 PM EST -----  Notify pt stress test is negative for ischemia, continue with echo and hotler

## 2023-12-18 ENCOUNTER — TELEPHONE (OUTPATIENT)
Dept: CARDIOLOGY | Facility: CLINIC | Age: 43
End: 2023-12-18
Payer: MEDICARE

## 2023-12-18 NOTE — TELEPHONE ENCOUNTER
----- Message from JOSEPHINE Peres sent at 12/18/2023 12:44 PM EST -----  Notify pt echo result:  Normal left-ventricular systolic function. EF 64%   No significant valve abnormalities noted.  Follow up as scheduled  
How Severe Is Your Acne?: moderate
Sent via my chart.    
Is This A New Presentation, Or A Follow-Up?: Follow Up Acne
Additional Comments (Use Complete Sentences): Patient is not on any medication at this time

## 2023-12-22 ENCOUNTER — TELEPHONE (OUTPATIENT)
Dept: CARDIOLOGY | Facility: CLINIC | Age: 43
End: 2023-12-22
Payer: MEDICARE

## 2023-12-22 RX ORDER — MAGNESIUM OXIDE 400 MG/1
400 TABLET ORAL DAILY
Qty: 90 TABLET | Refills: 3 | Status: SHIPPED | OUTPATIENT
Start: 2023-12-22

## 2023-12-22 NOTE — TELEPHONE ENCOUNTER
----- Message from JOSEPHINE Peres sent at 12/22/2023 10:07 AM EST -----  Notify pt holter result:  Maximum heart rate is 138.  Minimum heart rate of 52.  Average heart rate 88.  There are occasional PACs and PVCs.There were short run of SVT.  Avoid caffeine intake and can try magnesium oxide 400 mg daily for palpitations

## 2025-03-07 ENCOUNTER — OFFICE VISIT (OUTPATIENT)
Dept: ORTHOPEDIC SURGERY | Facility: CLINIC | Age: 45
End: 2025-03-07
Payer: MEDICARE

## 2025-03-07 VITALS
DIASTOLIC BLOOD PRESSURE: 82 MMHG | SYSTOLIC BLOOD PRESSURE: 135 MMHG | BODY MASS INDEX: 29.02 KG/M2 | OXYGEN SATURATION: 96 % | HEART RATE: 87 BPM | WEIGHT: 226 LBS

## 2025-03-07 DIAGNOSIS — M87.00 AVN (AVASCULAR NECROSIS OF BONE): ICD-10-CM

## 2025-03-07 DIAGNOSIS — M25.512 LEFT SHOULDER PAIN, UNSPECIFIED CHRONICITY: Primary | ICD-10-CM

## 2025-03-07 RX ORDER — VENLAFAXINE HCL 37.5 MG
CAPSULE, EXT RELEASE 24 HR ORAL EVERY 24 HOURS
COMMUNITY
Start: 2025-02-01

## 2025-03-07 NOTE — PROGRESS NOTES
Chief Complaint  Pain and Follow-up of the Left Shoulder    Subjective          Vasiliy Van presents to [facility] for   History of Present Illness    Bobby returns today for follow-up of his left shoulder.  He has a history of left shoulder pain and avascular necrosis.  We have treated him nonoperatively in the past.  He feels his pain is getting worse.  He feels a deep ache in the shoulder.  He has pain with reaching and lifting.  He has weakness at times.  No new injuries.    Allergies   Allergen Reactions    Bupropion Seizure        Social History     Socioeconomic History    Marital status: Legally    Tobacco Use    Smoking status: Every Day     Current packs/day: 1.00     Average packs/day: 1 pack/day for 15.0 years (15.0 ttl pk-yrs)     Types: Cigarettes    Smokeless tobacco: Never    Tobacco comments:     Last smoked 5am 10/20   Vaping Use    Vaping status: Never Used   Substance and Sexual Activity    Alcohol use: Never    Drug use: Never    Sexual activity: Defer        I reviewed the patient's chief complaint, history of present illness, review of systems, past medical history, surgical history, family history, social history, medications, and allergy list.     REVIEW OF SYSTEMS    Constitutional: Denies fevers, chills, weight loss  Cardiovascular: Denies chest pain, shortness of breath  Skin: Denies rashes, acute skin changes  Neurologic: Denies headache, loss of consciousness  MSK: Left shoulder pain      Objective   Vital Signs:   /82   Pulse 87   Wt 103 kg (226 lb)   SpO2 96%   BMI 29.02 kg/m²     Body mass index is 29.02 kg/m².    Physical Exam    General: Alert. No acute distress.   Left upper extremity: Forward elevation 180, External Rotation 45. Internal rotation mid lumbar spine. Neurovascularly intact. Sensation to light touch median, radial, ulnar nerve. Positive AIN, PIN, ulnar nerve motor function. Positive pulses. Positive  impingement. Pain with [place]. Pop and pain with Dynamic labral sheer testing.     Procedures    Imaging Results (Most Recent)       Procedure Component Value Units Date/Time    XR Shoulder 2+ View Left [[id number]] Resulted: 03/07/25 1042     Updated: 03/07/25 1042    Narrative:      Indications: Left shoulder pain    Views: AP, Grashey, Scap Y left shoulder    Findings: Mixed lytic and sclerotic appearance to the humeral head without   evidence of articular collapse.  Glenohumeral joint appears reduced.    Comparative Data: Comparative data found and reviewed today                     Assessment and Plan        XR Shoulder 2+ View Left    Result Date: 3/7/2025  Narrative: Indications: Left shoulder pain Views: AP, Grashey, Scap Y left shoulder Findings: Mixed lytic and sclerotic appearance to the humeral head without evidence of articular collapse.  Glenohumeral joint appears reduced. Comparative Data: Comparative data found and reviewed today      Diagnoses and all orders for this visit:    1. Left shoulder pain, unspecified chronicity (Primary)  -     XR Shoulder 2+ View Left    2. AVN (avascular necrosis of bone)  -     MRI Shoulder Left Without Contrast; Future        We reviewed his imaging.  His avascular necrosis appears to have progressed.  We will obtain an MRI for better evaluation of the lesion.  He will follow-up after the MRI to discuss results and additional treatment options.      Call or return if worsening symptoms.    Scribed for Shantanu Mendez MD by Shantanu Mendez MD  03/07/2025   12:24 EST         Follow Up       MRI result    Patient was given instructions and counseling regarding his condition or for health maintenance advice. Please see specific information pulled into the AVS if appropriate.       I have personally performed the services described in this document as scribed by the above individual and it is both accurate and complete. Shantanu Mendez MD 03/07/25 12:26 EST

## 2025-04-08 ENCOUNTER — HOSPITAL ENCOUNTER (OUTPATIENT)
Dept: MRI IMAGING | Facility: HOSPITAL | Age: 45
Discharge: HOME | End: 2025-04-08
Admitting: STUDENT IN AN ORGANIZED HEALTH CARE EDUCATION/TRAINING PROGRAM
Payer: MEDICARE

## 2025-04-08 DIAGNOSIS — M87.00 AVN (AVASCULAR NECROSIS OF BONE): ICD-10-CM

## 2025-04-08 PROCEDURE — 73221 MRI JOINT UPR EXTREM W/O DYE: CPT

## 2025-04-11 ENCOUNTER — OFFICE VISIT (OUTPATIENT)
Dept: ORTHOPEDIC SURGERY | Facility: CLINIC | Age: 45
End: 2025-04-11
Payer: MEDICARE

## 2025-04-11 ENCOUNTER — PREP FOR SURGERY (OUTPATIENT)
Dept: OTHER | Facility: HOSPITAL | Age: 45
End: 2025-04-11
Payer: MEDICARE

## 2025-04-11 VITALS — DIASTOLIC BLOOD PRESSURE: 92 MMHG | OXYGEN SATURATION: 98 % | HEART RATE: 85 BPM | SYSTOLIC BLOOD PRESSURE: 142 MMHG

## 2025-04-11 DIAGNOSIS — M87.00 AVN (AVASCULAR NECROSIS OF BONE): ICD-10-CM

## 2025-04-11 DIAGNOSIS — M25.512 LEFT SHOULDER PAIN, UNSPECIFIED CHRONICITY: Primary | ICD-10-CM

## 2025-04-11 DIAGNOSIS — M87.00 AVN (AVASCULAR NECROSIS OF BONE): Primary | ICD-10-CM

## 2025-04-11 NOTE — PROGRESS NOTES
Chief Complaint  Follow-up of the Left Shoulder    Subjective          Vasiliy Van presents to [facility] for   History of Present Illness    Bobby returns today for follow-up of his left shoulder.  He has left shoulder pain and avascular necrosis.  His pain has been increasing.  We obtained an MRI for further evaluation.  He is here today to discuss results and treatment options.      Allergies   Allergen Reactions    Bupropion Seizure        Social History     Socioeconomic History    Marital status: Legally    Tobacco Use    Smoking status: Every Day     Current packs/day: 1.00     Average packs/day: 1 pack/day for 15.0 years (15.0 ttl pk-yrs)     Types: Cigarettes    Smokeless tobacco: Never    Tobacco comments:     Last smoked 5am 10/20   Vaping Use    Vaping status: Never Used   Substance and Sexual Activity    Alcohol use: Never    Drug use: Never    Sexual activity: Defer        I reviewed the patient's chief complaint, history of present illness, review of systems, past medical history, surgical history, family history, social history, medications, and allergy list.     REVIEW OF SYSTEMS    Constitutional: Denies fevers, chills, weight loss  Cardiovascular: Denies chest pain, shortness of breath  Skin: Denies rashes, acute skin changes  Neurologic: Denies headache, loss of consciousness  MSK: Left shoulder pain      Objective   Vital Signs:   /92   Pulse 85   SpO2 98%     There is no height or weight on file to calculate BMI.    Physical Exam    General: Alert. No acute distress.   Left upper extremity:  Forward elevation 180, External Rotation 45. Internal rotation mid lumbar spine. Neurovascularly intact. Sensation to light touch median, radial, ulnar nerve. Positive AIN, PIN, ulnar nerve motor function. Positive pulses. Positive impingement. Pain with [place]. Pop and pain with Dynamic labral sheer testing.        Procedures    Imaging Results (Most  Recent)       None                     Assessment and Plan        MRI Shoulder Left Without Contrast  Result Date: 4/10/2025  Narrative: MRI SHOULDER LEFT WO CONTRAST Date of Exam: 4/8/2025 5:55 PM EDT Indication: Left shoulder AVN.  Comparison: Three-view left shoulder dated 3/7/2025 Technique:  Routine multiplanar/multisequence images of the left shoulder were obtained without contrast administration.  Findings: In the superior aspect of the humeral head is a 2.9 cm x 1.4 cm x 2.7 cm lesion demonstrating serpiginous borders and T1 high signal centrally consistent with avascular necrosis. There is mild irregularity of the overlying articular surface. No fracture or malalignment is identified. The acromioclavicular joint appears normal. No AC joint effusion is noted. A type II acromion is present. There is a partial-thickness tear of the supraspinatus tendon extending from the musculotendinous junction to the footprint and involving approximately 50% of tendon thickness. The tear is suspected to extend to the articular surface at the footprint. There is a small intrasubstance tear of the distal subscapularis tendon measuring 0.3 cm x 0.3 cm. The rotator cuff otherwise appears unremarkable. No muscle body atrophy is seen. The biceps long head tendon and its attachment to the superior labrum are intact. There is separation of the anterosuperior labrum from the underlying glenoid suspected to represent a sublabral foramen. The labrum otherwise appears unremarkable. Cartilage in the glenohumeral joint is intact. No loose body is seen. There is no MR evidence of glenohumeral joint adhesive capsulitis.     Impression: Impression: 2.9 cm x 1.4 cm x 2.7 cm focus of avascular necrosis in the superior humeral head. Partial-thickness tear of the supraspinatus tendon, as above. Tiny intrasubstance tear of the distal subscapularis tendon. Electronically Signed: Alex Bates MD  4/10/2025 5:07 PM EDT  Workstation ID: [id number]        Diagnoses and all orders for this visit:    1. Left shoulder pain, unspecified chronicity (Primary)  -     Ambulatory Referral to Pain Management    2. AVN (avascular necrosis of bone)        We reviewed his MRI.  He does have a focal area of avascular necrosis.  The overlying cartilage appears intact.  No obvious collapse on his imaging.  We discussed treatment options.  We will attempt joint salvage.  We discussed the risk, benefits communications, and alternatives to left shoulder arthroscopy with core decompression.  We discussed possible allograft.  He elected to proceed with surgical management.  He would like a referral to pain management for treatment prior to surgery.        Discussed surgery., Risks/benefits discussed with patient including, but not limited to: infection, bleeding, neurovascular damage, malunion, nonunion, aesthetic deformity, need for further surgery, and death., Discussed with patient the implant type being used during surgery and patient understands., Surgery pamphlet given., and Call or return if worsening symptoms.    Scribed for Shantanu Mendez MD by Poly Morgan MA  04/11/2025   09:28 EDT         Follow Up       2-week postop    Patient was given instructions and counseling regarding his condition or for health maintenance advice. Please see specific information pulled into the AVS if appropriate.     I have personally performed the services described in this document as scribed by the above individual and it is both accurate and complete. Shantanu Mendez MD 04/11/25 12:02 EDT

## 2025-04-11 NOTE — H&P (VIEW-ONLY)
Chief Complaint  Follow-up of the Left Shoulder    Subjective          Vasiliy Van presents to [facility] for   History of Present Illness    Bobby returns today for follow-up of his left shoulder.  He has left shoulder pain and avascular necrosis.  His pain has been increasing.  We obtained an MRI for further evaluation.  He is here today to discuss results and treatment options.      Allergies   Allergen Reactions    Bupropion Seizure        Social History     Socioeconomic History    Marital status: Legally    Tobacco Use    Smoking status: Every Day     Current packs/day: 1.00     Average packs/day: 1 pack/day for 15.0 years (15.0 ttl pk-yrs)     Types: Cigarettes    Smokeless tobacco: Never    Tobacco comments:     Last smoked 5am 10/20   Vaping Use    Vaping status: Never Used   Substance and Sexual Activity    Alcohol use: Never    Drug use: Never    Sexual activity: Defer        I reviewed the patient's chief complaint, history of present illness, review of systems, past medical history, surgical history, family history, social history, medications, and allergy list.     REVIEW OF SYSTEMS    Constitutional: Denies fevers, chills, weight loss  Cardiovascular: Denies chest pain, shortness of breath  Skin: Denies rashes, acute skin changes  Neurologic: Denies headache, loss of consciousness  MSK: Left shoulder pain      Objective   Vital Signs:   /92   Pulse 85   SpO2 98%     There is no height or weight on file to calculate BMI.    Physical Exam    General: Alert. No acute distress.   Left upper extremity:  Forward elevation 180, External Rotation 45. Internal rotation mid lumbar spine. Neurovascularly intact. Sensation to light touch median, radial, ulnar nerve. Positive AIN, PIN, ulnar nerve motor function. Positive pulses. Positive impingement. Pain with [place]. Pop and pain with Dynamic labral sheer testing.        Procedures    Imaging Results (Most  Recent)       None                     Assessment and Plan        MRI Shoulder Left Without Contrast  Result Date: 4/10/2025  Narrative: MRI SHOULDER LEFT WO CONTRAST Date of Exam: 4/8/2025 5:55 PM EDT Indication: Left shoulder AVN.  Comparison: Three-view left shoulder dated 3/7/2025 Technique:  Routine multiplanar/multisequence images of the left shoulder were obtained without contrast administration.  Findings: In the superior aspect of the humeral head is a 2.9 cm x 1.4 cm x 2.7 cm lesion demonstrating serpiginous borders and T1 high signal centrally consistent with avascular necrosis. There is mild irregularity of the overlying articular surface. No fracture or malalignment is identified. The acromioclavicular joint appears normal. No AC joint effusion is noted. A type II acromion is present. There is a partial-thickness tear of the supraspinatus tendon extending from the musculotendinous junction to the footprint and involving approximately 50% of tendon thickness. The tear is suspected to extend to the articular surface at the footprint. There is a small intrasubstance tear of the distal subscapularis tendon measuring 0.3 cm x 0.3 cm. The rotator cuff otherwise appears unremarkable. No muscle body atrophy is seen. The biceps long head tendon and its attachment to the superior labrum are intact. There is separation of the anterosuperior labrum from the underlying glenoid suspected to represent a sublabral foramen. The labrum otherwise appears unremarkable. Cartilage in the glenohumeral joint is intact. No loose body is seen. There is no MR evidence of glenohumeral joint adhesive capsulitis.     Impression: Impression: 2.9 cm x 1.4 cm x 2.7 cm focus of avascular necrosis in the superior humeral head. Partial-thickness tear of the supraspinatus tendon, as above. Tiny intrasubstance tear of the distal subscapularis tendon. Electronically Signed: Alex Bates MD  4/10/2025 5:07 PM EDT  Workstation ID: [id number]        Diagnoses and all orders for this visit:    1. Left shoulder pain, unspecified chronicity (Primary)  -     Ambulatory Referral to Pain Management    2. AVN (avascular necrosis of bone)        We reviewed his MRI.  He does have a focal area of avascular necrosis.  The overlying cartilage appears intact.  No obvious collapse on his imaging.  We discussed treatment options.  We will attempt joint salvage.  We discussed the risk, benefits communications, and alternatives to left shoulder arthroscopy with core decompression.  We discussed possible allograft.  He elected to proceed with surgical management.  He would like a referral to pain management for treatment prior to surgery.        Discussed surgery., Risks/benefits discussed with patient including, but not limited to: infection, bleeding, neurovascular damage, malunion, nonunion, aesthetic deformity, need for further surgery, and death., Discussed with patient the implant type being used during surgery and patient understands., Surgery pamphlet given., and Call or return if worsening symptoms.    Scribed for Shantanu Mendez MD by Poly Morgan MA  04/11/2025   09:28 EDT         Follow Up       2-week postop    Patient was given instructions and counseling regarding his condition or for health maintenance advice. Please see specific information pulled into the AVS if appropriate.     I have personally performed the services described in this document as scribed by the above individual and it is both accurate and complete. Shantanu Mendez MD 04/11/25 12:02 EDT       Speaking Coherently

## 2025-05-02 RX ORDER — HYDROCODONE BITARTRATE AND ACETAMINOPHEN 7.5; 325 MG/1; MG/1
1 TABLET ORAL EVERY 8 HOURS PRN
COMMUNITY
End: 2025-05-06 | Stop reason: HOSPADM

## 2025-05-02 RX ORDER — ROSUVASTATIN CALCIUM 20 MG/1
20 TABLET, COATED ORAL DAILY
COMMUNITY

## 2025-05-02 NOTE — PRE-PROCEDURE INSTRUCTIONS
PATIENT INSTRUCTED TO BE:    - NOTHING TO EAT AFTER MIDNIGHT OR CHEW, EXCEPT CAN HAVE CLEAR LIQUIDS 2 HOURS PRIOR TO SURGERY ARRIVAL TIME , NO MORE THAN 8 OZ. (NOTHING RED)     - TO HOLD ALL VITAMINS, SUPPLEMENTS, NSAIDS FOR ONE WEEK PRIOR TO THEIR SURGICAL PROCEDURE    - INSTRUCTED PT TO USE SURGICAL SOAP 1 TIME THE NIGHT PRIOR TO SURGERY 5/5/25_OR THE AM OF SURGERY 5/6/25 USE THE SOAP FROM NECK TO TOES, AVOID THEIR FACE, HAIR, AND PRIVATE PARTS. IF USE THE SOAP THE NIGHT PRIOR TO SURGERY, CHANGE BED LINENS AND NO PETS IN THE BED.     INSTRUCTED NO LOTIONS, JEWELRY, PIERCINGS,  NAIL POLISH, OR DEODORANT DAY OF SURGERY    -INSTRUCTED TO TAKE THE FOLLOWING MEDICATIONS THE DAY OF SURGERY WITH SIPS OF WATER: effexor, crestor, abilify, nexium, hydrocodone if needed.           - DO NOT BRING ANY MEDICATIONS WITH YOU TO THE HOSPITAL THE DAY OF SURGERY, EXCEPT IF USE INHALERS. BRING INHALERS DAY OF SURGERY       - BRING CPAP OR BIPAP TO THE HOSPITAL ONLY IF YOU ARE SPENDING THE NIGHT    - DO NOT SMOKE OR VAPE 24 HOURS PRIOR TO PROCEDURE PER ANESTHESIA REQUEST     -MAKE SURE YOU HAVE A RIDE HOME OR SOMEONE TO STAY WITH YOU THE DAY OF THE PROCEDURE AFTER YOU GO HOME     - FOLLOW ANY OTHER INSTRUCTIONS GIVEN TO YOU BY YOUR SURGEON'S OFFICE.     DAY OF SURGERY ___ _______ AT [facility] ( [address]), YOU CAN  PARK OR SELF PARK. ENTER THE [facility] THRU MAIN ENTRANCE, TAKE ELEVATORS TO THE FIRST FLOOR, CHECK IN AT THE DESK FOR REGISTRATION/ SURGERY. OR AT [facility]  PARK IN THE OPEN LOT, COME TO ENTRANCE / [facility], FIRST FLOOR, CHECK IN AT THE DESK FOR REGISTRATION/ SURGERY     - YOU WILL RECEIVE A PHONE CALL THE DAY PRIOR TO SURGERY BETWEEN 1PM AND 4 PM WITH ARRIVAL TIME, IF YOUR SURGERY IS ON A MONDAY YOU WILL RECEIVE A CALL THE FRIDAY PRIOR TO SURGERY DATE    - BRING CASH OR CREDIT CARD FOR COPAYMENT OF MEDICATIONS AFTER SURGERY IF YOU USE THE HOSPITAL  PHARMACY ([facility] TO BED)    - PREADMISSION TESTING NURSE MICHEAL Barboza [phone]  IF HAVE ANY QUESTIONS     -PATIENT PROVIDED THE NUMBER FOR PREOP SURGICAL DEPT IF HAD QUESTIONS AFTER HOURS PRIOR TO SURGERY ([phone]).  INFORMED PT IF NO ANSWER, LEAVE A MESSAGE AND SOMEONE WILL RETURN THEIR CALL       PATIENT VERBALIZED UNDERSTANDING       Clear Liquid Diet        Find out when you need to start a clear liquid diet.   Think of “clear liquids” as anything you could read a newspaper through. This includes things like water, broth, sports drinks, or tea WITHOUT any kind of milk or cream.           Once you are told to start a clear liquid diet, only drink these things until 2 hours before arrival to the hospital or when the hospital says to stop. Total volume limitation: 8 oz.       Clear liquids you CAN drink:   Water   Clear broth: beef, chicken, vegetable, or bone broth with nothing in it   Gatorade   Lemonade or Jose-aid   Soda   Tea, coffee (NO cream or honey)   Jell-O (without fruit)   Popsicles (without fruit or cream)   Italian ices   Juice without pulp: apple, white, grape   You may use salt, pepper, and sugar  NO RED  NO NOODLES    Do NOT drink:   Milk or cream   Soy milk, almond milk, coconut milk, or other non-dairy drinks and   creamers   Milkshakes or smoothies   Tomato juice   Orange juice   Grapefruit juice   Cream soups or any other than broth         Clear Liquid Diet:  Do NOT eat any solid food.  Do NOT eat or suck on mints or candy.  Do NOT chew gum.  Do NOT drink thick liquids like milk or juice with pulp in it.  Do NOT add milk, cream, or anything like soy milk or almond milk to coffee or tea.

## 2025-05-06 ENCOUNTER — ANESTHESIA EVENT (OUTPATIENT)
Dept: PERIOP | Facility: HOSPITAL | Age: 45
End: 2025-05-06
Payer: MEDICARE

## 2025-05-06 ENCOUNTER — HOSPITAL ENCOUNTER (OUTPATIENT)
Facility: HOSPITAL | Age: 45
Setting detail: HOSPITAL OUTPATIENT SURGERY
Discharge: HOME | End: 2025-05-06
Attending: STUDENT IN AN ORGANIZED HEALTH CARE EDUCATION/TRAINING PROGRAM | Admitting: STUDENT IN AN ORGANIZED HEALTH CARE EDUCATION/TRAINING PROGRAM
Payer: MEDICARE

## 2025-05-06 ENCOUNTER — ANESTHESIA EVENT CONVERTED (OUTPATIENT)
Dept: ANESTHESIOLOGY | Facility: HOSPITAL | Age: 45
End: 2025-05-06
Payer: MEDICARE

## 2025-05-06 ENCOUNTER — APPOINTMENT (OUTPATIENT)
Dept: GENERAL RADIOLOGY | Facility: HOSPITAL | Age: 45
End: 2025-05-06
Payer: MEDICARE

## 2025-05-06 ENCOUNTER — ANESTHESIA (OUTPATIENT)
Dept: PERIOP | Facility: HOSPITAL | Age: 45
End: 2025-05-06
Payer: MEDICARE

## 2025-05-06 VITALS
OXYGEN SATURATION: 97 % | WEIGHT: 216.71 LBS | HEART RATE: 90 BPM | TEMPERATURE: 97.4 F | SYSTOLIC BLOOD PRESSURE: 122 MMHG | BODY MASS INDEX: 27.81 KG/M2 | HEIGHT: 74 IN | RESPIRATION RATE: 16 BRPM | DIASTOLIC BLOOD PRESSURE: 89 MMHG

## 2025-05-06 DIAGNOSIS — M87.00 AVN (AVASCULAR NECROSIS OF BONE): ICD-10-CM

## 2025-05-06 PROCEDURE — 25010000002 CEFAZOLIN PER 500 MG: Performed by: STUDENT IN AN ORGANIZED HEALTH CARE EDUCATION/TRAINING PROGRAM

## 2025-05-06 PROCEDURE — 25010000002 DEXAMETHASONE PER 1 MG: Performed by: ANESTHESIOLOGY

## 2025-05-06 PROCEDURE — 76000 FLUOROSCOPY <1 HR PHYS/QHP: CPT

## 2025-05-06 PROCEDURE — 25810000003 LACTATED RINGERS PER 1000 ML: Performed by: ANESTHESIOLOGY

## 2025-05-06 PROCEDURE — L3670 SO ACRO/CLAV CAN WEB PRE OTS: HCPCS | Performed by: STUDENT IN AN ORGANIZED HEALTH CARE EDUCATION/TRAINING PROGRAM

## 2025-05-06 PROCEDURE — 25010000002 SUGAMMADEX 200 MG/2ML SOLUTION: Performed by: NURSE ANESTHETIST, CERTIFIED REGISTERED

## 2025-05-06 PROCEDURE — 25010000002 LIDOCAINE PF 2% 2 % SOLUTION: Performed by: NURSE ANESTHETIST, CERTIFIED REGISTERED

## 2025-05-06 PROCEDURE — 25010000002 ONDANSETRON PER 1 MG: Performed by: NURSE ANESTHETIST, CERTIFIED REGISTERED

## 2025-05-06 PROCEDURE — 25010000002 MIDAZOLAM PER 1MG: Performed by: ANESTHESIOLOGY

## 2025-05-06 PROCEDURE — 25010000002 PROPOFOL 10 MG/ML EMULSION: Performed by: NURSE ANESTHETIST, CERTIFIED REGISTERED

## 2025-05-06 PROCEDURE — C1713 ANCHOR/SCREW BN/BN,TIS/BN: HCPCS | Performed by: STUDENT IN AN ORGANIZED HEALTH CARE EDUCATION/TRAINING PROGRAM

## 2025-05-06 PROCEDURE — 25010000002 FENTANYL CITRATE (PF) 50 MCG/ML SOLUTION: Performed by: NURSE ANESTHETIST, CERTIFIED REGISTERED

## 2025-05-06 DEVICE — DBM ALLOSYNC PURE 5CC: Type: IMPLANTABLE DEVICE | Site: SHOULDER | Status: FUNCTIONAL

## 2025-05-06 RX ORDER — MIDAZOLAM HYDROCHLORIDE 2 MG/2ML
2 INJECTION, SOLUTION INTRAMUSCULAR; INTRAVENOUS ONCE
Status: COMPLETED | OUTPATIENT
Start: 2025-05-06 | End: 2025-05-06

## 2025-05-06 RX ORDER — ROCURONIUM BROMIDE 10 MG/ML
INJECTION, SOLUTION INTRAVENOUS AS NEEDED
Status: DISCONTINUED | OUTPATIENT
Start: 2025-05-06 | End: 2025-05-06 | Stop reason: SURG

## 2025-05-06 RX ORDER — ONDANSETRON 2 MG/ML
INJECTION INTRAMUSCULAR; INTRAVENOUS AS NEEDED
Status: DISCONTINUED | OUTPATIENT
Start: 2025-05-06 | End: 2025-05-06 | Stop reason: SURG

## 2025-05-06 RX ORDER — LIDOCAINE HYDROCHLORIDE 20 MG/ML
INJECTION, SOLUTION EPIDURAL; INFILTRATION; INTRACAUDAL; PERINEURAL AS NEEDED
Status: DISCONTINUED | OUTPATIENT
Start: 2025-05-06 | End: 2025-05-06 | Stop reason: SURG

## 2025-05-06 RX ORDER — ONDANSETRON 2 MG/ML
4 INJECTION INTRAMUSCULAR; INTRAVENOUS ONCE AS NEEDED
Status: DISCONTINUED | OUTPATIENT
Start: 2025-05-06 | End: 2025-05-06 | Stop reason: HOSPADM

## 2025-05-06 RX ORDER — SODIUM CHLORIDE, SODIUM LACTATE, POTASSIUM CHLORIDE, CALCIUM CHLORIDE 600; 310; 30; 20 MG/100ML; MG/100ML; MG/100ML; MG/100ML
20 INJECTION, SOLUTION INTRAVENOUS CONTINUOUS PRN
Status: DISCONTINUED | OUTPATIENT
Start: 2025-05-06 | End: 2025-05-06 | Stop reason: HOSPADM

## 2025-05-06 RX ORDER — DEXAMETHASONE SODIUM PHOSPHATE 4 MG/ML
INJECTION, SOLUTION INTRA-ARTICULAR; INTRALESIONAL; INTRAMUSCULAR; INTRAVENOUS; SOFT TISSUE AS NEEDED
Status: DISCONTINUED | OUTPATIENT
Start: 2025-05-06 | End: 2025-05-06 | Stop reason: SURG

## 2025-05-06 RX ORDER — BUPIVACAINE HCL/0.9 % NACL/PF 0.125 %
PLASTIC BAG, INJECTION (ML) EPIDURAL AS NEEDED
Status: DISCONTINUED | OUTPATIENT
Start: 2025-05-06 | End: 2025-05-06 | Stop reason: SURG

## 2025-05-06 RX ORDER — PROPOFOL 10 MG/ML
VIAL (ML) INTRAVENOUS AS NEEDED
Status: DISCONTINUED | OUTPATIENT
Start: 2025-05-06 | End: 2025-05-06 | Stop reason: SURG

## 2025-05-06 RX ORDER — ONDANSETRON 4 MG/1
4 TABLET, FILM COATED ORAL EVERY 8 HOURS PRN
Qty: 30 TABLET | Refills: 0 | Status: SHIPPED | OUTPATIENT
Start: 2025-05-06

## 2025-05-06 RX ORDER — FENTANYL CITRATE 50 UG/ML
50 INJECTION, SOLUTION INTRAMUSCULAR; INTRAVENOUS
Status: DISCONTINUED | OUTPATIENT
Start: 2025-05-06 | End: 2025-05-06 | Stop reason: HOSPADM

## 2025-05-06 RX ORDER — DEXAMETHASONE SODIUM PHOSPHATE 4 MG/ML
INJECTION, SOLUTION INTRA-ARTICULAR; INTRALESIONAL; INTRAMUSCULAR; INTRAVENOUS; SOFT TISSUE
Status: COMPLETED | OUTPATIENT
Start: 2025-05-06 | End: 2025-05-06

## 2025-05-06 RX ORDER — ACETAMINOPHEN 500 MG
1000 TABLET ORAL ONCE
Status: COMPLETED | OUTPATIENT
Start: 2025-05-06 | End: 2025-05-06

## 2025-05-06 RX ORDER — OXYCODONE AND ACETAMINOPHEN 5; 325 MG/1; MG/1
1 TABLET ORAL EVERY 4 HOURS PRN
Qty: 30 TABLET | Refills: 0 | Status: SHIPPED | OUTPATIENT
Start: 2025-05-06

## 2025-05-06 RX ORDER — FENTANYL CITRATE 50 UG/ML
INJECTION, SOLUTION INTRAMUSCULAR; INTRAVENOUS AS NEEDED
Status: DISCONTINUED | OUTPATIENT
Start: 2025-05-06 | End: 2025-05-06 | Stop reason: SURG

## 2025-05-06 RX ORDER — BUPIVACAINE HYDROCHLORIDE AND EPINEPHRINE 5; 5 MG/ML; UG/ML
INJECTION, SOLUTION EPIDURAL; INTRACAUDAL; PERINEURAL
Status: COMPLETED | OUTPATIENT
Start: 2025-05-06 | End: 2025-05-06

## 2025-05-06 RX ORDER — DEXMEDETOMIDINE HYDROCHLORIDE 100 UG/ML
INJECTION, SOLUTION INTRAVENOUS AS NEEDED
Status: DISCONTINUED | OUTPATIENT
Start: 2025-05-06 | End: 2025-05-06 | Stop reason: SURG

## 2025-05-06 RX ORDER — PROMETHAZINE HYDROCHLORIDE 25 MG/1
25 SUPPOSITORY RECTAL ONCE AS NEEDED
Status: DISCONTINUED | OUTPATIENT
Start: 2025-05-06 | End: 2025-05-06 | Stop reason: HOSPADM

## 2025-05-06 RX ORDER — OXYCODONE HYDROCHLORIDE 5 MG/1
5 TABLET ORAL
Status: DISCONTINUED | OUTPATIENT
Start: 2025-05-06 | End: 2025-05-06 | Stop reason: HOSPADM

## 2025-05-06 RX ORDER — EPHEDRINE SULFATE 50 MG/ML
INJECTION INTRAVENOUS AS NEEDED
Status: DISCONTINUED | OUTPATIENT
Start: 2025-05-06 | End: 2025-05-06 | Stop reason: SURG

## 2025-05-06 RX ORDER — DOCUSATE SODIUM 100 MG/1
100 CAPSULE, LIQUID FILLED ORAL 2 TIMES DAILY PRN
Qty: 20 CAPSULE | Refills: 0 | Status: SHIPPED | OUTPATIENT
Start: 2025-05-06

## 2025-05-06 RX ORDER — PROMETHAZINE HYDROCHLORIDE 12.5 MG/1
25 TABLET ORAL ONCE AS NEEDED
Status: DISCONTINUED | OUTPATIENT
Start: 2025-05-06 | End: 2025-05-06 | Stop reason: HOSPADM

## 2025-05-06 RX ADMIN — DEXAMETHASONE SODIUM PHOSPHATE 4 MG: 4 INJECTION, SOLUTION INTRA-ARTICULAR; INTRALESIONAL; INTRAMUSCULAR; INTRAVENOUS; SOFT TISSUE at 10:21

## 2025-05-06 RX ADMIN — ONDANSETRON 4 MG: 2 INJECTION INTRAMUSCULAR; INTRAVENOUS at 10:23

## 2025-05-06 RX ADMIN — PROPOFOL 200 MG: 10 INJECTION, EMULSION INTRAVENOUS at 09:18

## 2025-05-06 RX ADMIN — EPHEDRINE SULFATE 10 MG: 50 INJECTION INTRAVENOUS at 10:10

## 2025-05-06 RX ADMIN — SUGAMMADEX 200 MG: 100 INJECTION, SOLUTION INTRAVENOUS at 10:28

## 2025-05-06 RX ADMIN — Medication 100 MCG: at 10:10

## 2025-05-06 RX ADMIN — DEXAMETHASONE SODIUM PHOSPHATE 8 MG: 4 INJECTION, SOLUTION INTRAMUSCULAR; INTRAVENOUS at 08:45

## 2025-05-06 RX ADMIN — SODIUM CHLORIDE 2 G: 9 INJECTION, SOLUTION INTRAVENOUS at 09:15

## 2025-05-06 RX ADMIN — LIDOCAINE HYDROCHLORIDE 50 MG: 20 INJECTION, SOLUTION INTRAVENOUS at 09:18

## 2025-05-06 RX ADMIN — DEXAMETHASONE SODIUM PHOSPHATE 4 MG: 4 INJECTION, SOLUTION INTRA-ARTICULAR; INTRALESIONAL; INTRAMUSCULAR; INTRAVENOUS; SOFT TISSUE at 09:18

## 2025-05-06 RX ADMIN — SODIUM CHLORIDE, POTASSIUM CHLORIDE, SODIUM LACTATE AND CALCIUM CHLORIDE 20 ML/HR: 600; 310; 30; 20 INJECTION, SOLUTION INTRAVENOUS at 08:26

## 2025-05-06 RX ADMIN — ACETAMINOPHEN 1000 MG: 500 TABLET ORAL at 08:26

## 2025-05-06 RX ADMIN — Medication 100 MCG: at 10:00

## 2025-05-06 RX ADMIN — BUPIVACAINE HYDROCHLORIDE AND EPINEPHRINE BITARTRATE 32 ML: 5; .005 INJECTION, SOLUTION EPIDURAL; INTRACAUDAL; PERINEURAL at 08:45

## 2025-05-06 RX ADMIN — MIDAZOLAM HYDROCHLORIDE 2 MG: 1 INJECTION, SOLUTION INTRAMUSCULAR; INTRAVENOUS at 08:39

## 2025-05-06 RX ADMIN — DEXMEDETOMIDINE 20 MCG: 100 INJECTION, SOLUTION, CONCENTRATE INTRAVENOUS at 08:35

## 2025-05-06 RX ADMIN — MIDAZOLAM HYDROCHLORIDE 2 MG: 1 INJECTION, SOLUTION INTRAMUSCULAR; INTRAVENOUS at 08:35

## 2025-05-06 RX ADMIN — Medication 100 MCG: at 09:55

## 2025-05-06 RX ADMIN — FENTANYL CITRATE 50 MCG: 50 INJECTION, SOLUTION INTRAMUSCULAR; INTRAVENOUS at 10:21

## 2025-05-06 RX ADMIN — ROCURONIUM BROMIDE 50 MG: 10 INJECTION, SOLUTION INTRAVENOUS at 09:18

## 2025-05-06 RX ADMIN — EPHEDRINE SULFATE 5 MG: 50 INJECTION INTRAVENOUS at 10:06

## 2025-05-06 RX ADMIN — FENTANYL CITRATE 50 MCG: 50 INJECTION, SOLUTION INTRAMUSCULAR; INTRAVENOUS at 09:18

## 2025-05-06 NOTE — DISCHARGE INSTRUCTIONS
[facility]  Postop Instructions  Outpatient Shoulder Surgery    DIET  Begin with clear liquids and light foods (jello, soups, etc).  Progress to your normal diet if you are not nauseated.  Take pain medicine with food - crackers, bread, etc.    WOUND CARE  Maintain your operative dressing, loosen bandage if swelling or tingling of the elbow, wrist, or hand occurs.  It is normal for the shoulder to bleed and swell from the surgery site.  If blood soaks onto the bandage, do not become alarmed; reinforce with additional dressing.  If blood saturates more than 2 bandages, call [facility].  Remove surgical dressing on the 2nd post-operative day. If minimal drainage is present, apply bandaids over incisions.  Do not use antibiotic ointment.  To avoid infection, keep surgical incisions clean and dry.  You may shower on the 2nd day but do not submerge.    MEDICATIONS  Pain medication is injected into the wound and shoulder joint during surgery.  This will wear off within 8 to 12 hours.  The anesthesiologist's regional nerve block will usually wear off in 18 to 24 hours.  Aleve 500mg 2 times per day may be taken for pain if you do not have a history of bleeding or ulcers.  Some patients will require narcotic pain medication for a short period of time.  This can be taken as per directions on the bottle.  Potential narcotic side effects include: constipation, nausea, vomiting, sleepiness.  If nausea and vomiting continue for more than 12-24 hours, contact the office to have your medication changed.  Do not drive a car or operate machinery while taking the prescription pain medication.  Increase vegetables, whole grains, and water intake to decrease risk of constipation related to pain medications.  Drink a full glass of water with every dose of medication (prescription or over the counter) and take with food.    ACTIVITY  When sleeping or resting, inclined positions (ie recliner chair) and a pillow under the forearm  for support may provide better comfort.  Do not engage in activities which increase pain/swelling (lifting or any repetitive above shoulder-level activities) over the first 7-10 days following surgery or activities where you bring your arm away from your body.  Avoid long periods of sitting (without arm supported) or long distance traveling for 2 weeks.  No driving or operating heavy machinery until you are off all prescription pain medications.  You may return to sedentary work or school 1-3 days after surgery, if pain is tolerable with sling.  SLING  Repairs and Reconstructions:  For the first two weeks, you must wear sling/immobilizer at all times except when doing exercises.  When around people in close proximity or in inclement weather, you should wear sling for protection.    ICE THERAPY  Begin icing immediately after surgery using ice compression machine or cold packs.    EXERCISE   Begin shoulder exercises the following day after surgery unless otherwise instructed by the surgeon.  Pendulums, Saws, and Table Slides; 3 x   You may also begin elbow, wrist, and hand range of motion on the first post-operative day about 2-3 times per day.  Formal physical therapy, if needed, will begin 2 - 6 weeks after surgery.      Saws                             Pendulums                 Table Slides          EMERGENCIES  Contact [facility] if any of the following are present:  Painful swelling or numbness, tingling, color change, or coolness in the wrist or hand  Unrelenting pain  Fever over 101 (It is normal to have a low grade fever for the first day or two following surgery.)   Redness or worsening pain around incisions  Continuous drainage or bleeding from incision (a small amount of drainage is expected)  Difficulty breathing, wheezing  Excessive nausea/vomiting causing inability to keep anything down for 12-24hours  Inability to urinate    WHAT TO EXPECT AT YOUR FIRST POST OPERATIVE VISIT  Suture/stitches will be  removed and new bandage applied if needed.  Follow up X-rays may be taken

## 2025-05-06 NOTE — ANESTHESIA PROCEDURE NOTES
Peripheral Block      Patient reassessed immediately prior to procedure    Patient location during procedure: pre-op  Start time: 5/6/2025 8:40 AM  Stop time: 5/6/2025 8:45 AM  Reason for block: at surgeon's request and post-op pain management  Performed by  Anesthesiologist: Guillermo Logan MD  Preanesthetic Checklist  Completed: patient identified, IV checked, site marked, risks and benefits discussed, surgical consent, monitors and equipment checked, pre-op evaluation and timeout performed  Prep:  Pt Position: supine (HOB elevated)  Sterile barriers:cap, washed/disinfected hands, sterile barriers, gloves, mask, partial drape and alcohol skin prep  Prep: ChloraPrep  Patient monitoring: blood pressure monitoring, continuous pulse oximetry and EKG  Procedure    Sedation: yes  Performed under: local infiltration  Guidance:ultrasound guided and nerve stimulator    ULTRASOUND INTERPRETATION.  Using ultrasound guidance a 22 G gauge needle was placed in close proximity to the brachial plexus nerve, at which point, under ultrasound guidance anesthetic was injected in the area of the nerve and spread of the anesthesia was seen on ultrasound in close proximity thereto.  There were no abnormalities seen on ultrasound; a digital image was taken; and the patient tolerated the procedure with no complications. Images:still images obtained, printed/placed on chart  Loss of twitch: 0.5 mA  Laterality:left  Block Type:interscalene  Injection Technique:single-shot  Needle Type:echogenic  Needle Gauge:22 G (2in)  Resistance on Injection: none    Medications Used: bupivacaine-EPINEPHrine PF (MARCAINE w/EPI) 0.5% -[id number] injection - Injection, Interscalene   32 mL - 5/6/2025 8:45:00 AM  dexamethasone (DECADRON) injection 4 mg/mL - Injection, Interscalene   8 mg - 5/6/2025 8:45:00 AM      Medications  Comment:Precedex 50mcg added to above solution prior to injection    8cc of solution injected into musculotaneous nerve bundle    Post  Assessment  Injection Assessment: negative aspiration for heme, no paresthesia on injection and incremental injection  Patient Tolerance:comfortable throughout block  Complications:no  Additional Notes  The block or continuous infusion is requested by the referring physician for management of postoperative pain, or pain related to a procedure. Ultrasound guidance (deemed medically necessary). Painless injection, pt was awake and conversant during the procedure without complications. Needle and surrounding structures visualized throughout procedure. No adverse reactions or complications seen during this period. Post-procedure image showed no signs of complication, and anatomy was consistent with an uncomplicated nerve blockade.  Performed by: Guillermo Logan MD

## 2025-05-06 NOTE — INTERVAL H&P NOTE
H&P reviewed. The patient was examined and there are no changes to the H&P.    Electronically signed by Shantanu Mendez MD, 05/06/25, 7:51 AM EDT.

## 2025-05-06 NOTE — OP NOTE
SHOULDER ARTHROSCOPY  Procedure Report    Patient Name:  Vasiliy Van  YOB: 1980    Date of Surgery:  5/6/2025     Indications: Vasiliy is a 44-year-old male with a history of left shoulder avascular necrosis.  We discussed treatment options.  We discussed the risk, benefits communications, alternatives to left shoulder arthroscopy with core decompression and possible allograft.  He elected to proceed with surgical management and consent was obtained.    Pre-op Diagnosis:   AVN (avascular necrosis of bone) [M87.00]       Post-Op Diagnosis Codes:     * AVN (avascular necrosis of bone) [M87.00]    Procedure:  Procedure(s):  Left shoulder arthroscopy with core decompression and allograft augmentation    Staff:  Surgeon(s):  Shantanu Mendez MD    Assistant: Malissa Murray APRN    Anesthesia: General with Block    Estimated Blood Loss:  20 mL    Implants:    Implant Name Type Inv. Item Serial No.  Lot No. LRB No. Used Action   DBM ALLOSYNC PURE 5CC - [id number] Implant DBM ALLOSYNC PURE 5CC  [employer] [id number] Left 1 Implanted       Specimen:          None        Findings: Avascular porosis, intact chondral surface, degenerative superior labral tearing with stable biceps anchor    Complications: None    Description of Procedure: The patient was met in the preoperative holding area and the operative extremity was marked.  A preoperative peripheral nerve block was performed by the anesthesia team.  The patient was transported the operating room and laid supine on the operating room table.  General anesthesia was induced.  2 g of preoperative Ancef were administered.  The patient was then carefully placed in the modified beachchair position with all extremities well-padded.  An arm holding device was used during the case.  The left upper extremity was prepped and draped in the usual sterile fashion.  A timeout was taken to ensure the appropriate patient, procedure, and procedural  site.  All were in agreement.  I began by marking the superficial landmarks over the shoulder.  A vertical incision was made for a posterior viewing portal and the arthroscope was inserted into the glenohumeral joint.  A diagnostic arthroscopy was performed.  Chondral surfaces were intact.  There was degenerative tearing along the superior labrum.  The biceps was stable to probe.  The superior cuff was intact.  The subscapularis was intact.  A vertical incision was made for an anterior working portal after needle localization.  I inserted the arthroscopic shaver.  I debrided the degenerative superior labral tearing back to a stable border.  The arthroscope was then removed from the glenohumeral joint.  I utilized C arm to localize my incision over the lateral aspect of the proximal humerus.  Blunt dissection was carried down to the bone.  I then utilized the needle, drill, and the flip cutter reamer from [employer] to perform a core decompression.  I advanced the needle into the necrotic bone on x-ray and reamed out the central portion.  I then injected 5 cc of allosync through the cannula into the defect.  This was completed without complication.  The arthroscope was again introduced into the joint.  Chondral surface remained intact.  No intra-articular perforations were noted.  I was satisfied with the decompression at this time.  All instrumentation was removed.   Wounds were closed with 3-0 nylon in interrupted fashion.  Wounds were dressed with Xeroform, 4 x 4, ABD, and tape.  The patient was placed into an abduction sling.  The patient woke from anesthesia without complication and was transferred to the recovery room in stable condition.  All surgical counts were correct.    Assistant: Malissa Murray APRN  was responsible for performing the following activities: Retraction, Suction, Irrigation, Suturing, Closing, and Placing Dressing and their skilled assistance was necessary for the success of this  case.    Shantanu Mendez MD     Date: 5/6/2025  Time: 10:26 EDT

## 2025-05-06 NOTE — ANESTHESIA PREPROCEDURE EVALUATION
Anesthesia Evaluation     Patient summary reviewed and Nursing notes reviewed   no history of anesthetic complications:   NPO Solid Status: > 8 hours  NPO Liquid Status: > 2 hours           Airway   Mallampati: II  TM distance: >3 FB  Neck ROM: full  No difficulty expected  Dental    (+) poor dentition    Pulmonary - normal exam    breath sounds clear to auscultation  (+) ,shortness of breath, sleep apnea  Cardiovascular - normal exam  Exercise tolerance: good (4-7 METS)    Rhythm: regular  Rate: normal    (+) hypertension, hyperlipidemia      Neuro/Psych  (+) seizures (only occured once thought it was due to bupropion)  GI/Hepatic/Renal/Endo    (+) GERD    Musculoskeletal     Abdominal    Substance History - negative use     OB/GYN negative ob/gyn ROS         Other   arthritis,     ROS/Med Hx Other: PAT Nursing Notes unavailable.       Phys Exam Other: Several chipped broken cracked teeth              Anesthesia Plan    ASA 3     general with block     (Patient understands anesthesia not responsible for dental damage.    Pt uses thc daily)  intravenous induction     Anesthetic plan, risks, benefits, and alternatives have been provided, discussed and informed consent has been obtained with: patient.    Use of blood products discussed with patient .    Plan discussed with CRNA.      CODE STATUS:

## 2025-05-06 NOTE — ANESTHESIA POSTPROCEDURE EVALUATION
Patient: Vasiliy Van    Procedure Summary       Date: 05/06/25 Room / Location: [facility]  / [facility]    Anesthesia Start: 0913 Anesthesia Stop: 1046    Procedure: Left shoulder arthroscopy with core decompression, possible allograft: Surgery using the camera system to evaluate the joint and treat the area of dead bone (Left: Shoulder) Diagnosis:       AVN (avascular necrosis of bone)      (AVN (avascular necrosis of bone) [M87.00])    Surgeons: Shantanu Mendez MD Provider: Guillermo Logan MD    Anesthesia Type: general with block ASA Status: 3            Anesthesia Type: general with block    Vitals  Vitals Value Taken Time   /93 05/06/25 11:28   Temp 36.2 °C (97.1 °F) 05/06/25 10:40   Pulse 90 05/06/25 11:29   Resp 19 05/06/25 10:40   SpO2 97 % 05/06/25 11:28   Vitals shown include unfiled device data.        Post Anesthesia Care and Evaluation    Patient location during evaluation: bedside  Patient participation: complete - patient participated  Level of consciousness: awake  Pain score: 0  Pain management: adequate    Airway patency: patent  Anesthetic complications: No anesthetic complications  PONV Status: none  Cardiovascular status: acceptable and stable  Respiratory status: acceptable  Hydration status: acceptable

## 2025-05-20 ENCOUNTER — HOSPITAL ENCOUNTER (EMERGENCY)
Facility: HOSPITAL | Age: 45
Discharge: HOME | End: 2025-05-20
Attending: EMERGENCY MEDICINE | Admitting: EMERGENCY MEDICINE
Payer: MEDICARE

## 2025-05-20 VITALS
BODY MASS INDEX: 27.72 KG/M2 | OXYGEN SATURATION: 95 % | RESPIRATION RATE: 18 BRPM | HEIGHT: 74 IN | DIASTOLIC BLOOD PRESSURE: 119 MMHG | TEMPERATURE: 98.3 F | SYSTOLIC BLOOD PRESSURE: 143 MMHG | HEART RATE: 85 BPM | WEIGHT: 216 LBS

## 2025-05-20 DIAGNOSIS — M25.519 POSTOPERATIVE PAIN, ACUTE, SHOULDER: Primary | ICD-10-CM

## 2025-05-20 DIAGNOSIS — G89.18 POSTOPERATIVE PAIN, ACUTE, SHOULDER: Primary | ICD-10-CM

## 2025-05-20 PROCEDURE — 96372 THER/PROPH/DIAG INJ SC/IM: CPT

## 2025-05-20 PROCEDURE — 25010000002 HYDROMORPHONE 1 MG/ML SOLUTION: Performed by: EMERGENCY MEDICINE

## 2025-05-20 PROCEDURE — 99282 EMERGENCY DEPT VISIT SF MDM: CPT

## 2025-05-20 RX ORDER — BACLOFEN 10 MG/1
10 TABLET ORAL 3 TIMES DAILY PRN
COMMUNITY

## 2025-05-20 RX ORDER — PANTOPRAZOLE SODIUM 40 MG/1
40 TABLET, DELAYED RELEASE ORAL DAILY
COMMUNITY
Start: 2025-05-07

## 2025-05-20 RX ADMIN — HYDROMORPHONE HYDROCHLORIDE 1 MG: 1 INJECTION, SOLUTION INTRAMUSCULAR; INTRAVENOUS; SUBCUTANEOUS at 08:15

## 2025-05-20 NOTE — ED PROVIDER NOTES
"SHARED VISIT ATTESTATION:    This visit was performed by myself and an APC.  I performed the substantive portion of the medical decision making.  The management plan was made or approved by me, and I take responsibility for patient management.      SHARED VISIT NOTE:    Patient is 44 y.o. year old male that presents to the ED for evaluation of shoulder pain.  Patient is status post surgery due to avascular necrosis.  Patient reports the pain became worse yesterday..     Physical Exam    ED Course:    BP (!) 143/119 (BP Location: Right arm, Patient Position: Sitting)   Pulse 85   Temp 98.3 °F (36.8 °C) (Oral)   Resp 18   Ht 188 cm (74\")   Wt 98 kg (216 lb)   SpO2 95%   BMI 27.73 kg/m²       The following orders were placed and all results were independently analyzed by me:  Orders Placed This Encounter   Procedures    IP General Consult (Use specialty-specific consult if known)       Medications Given in the Emergency Department:  Medications   HYDROmorphone (DILAUDID) injection 1 mg (1 mg Intramuscular Given 5/20/25 0815)        ED Course:    ED Course as of 05/20/25 0834   Tue May 20, 2025   0801 Discussed patient with Dr. Mendez.  Stated that the incision looks well and nothing needs to be ordered from his standpoint as the bruising and swelling is common. [MD]      ED Course User Index  [MD] Carlos Lindsay PA-C       Labs:    Lab Results (last 24 hours)       ** No results found for the last 24 hours. **             Imaging:    No Radiology Exams Resulted Within Past 24 Hours    MDM:    Procedures                         Martín Palumbo DO  08:34 EDT  05/20/25         Martín Palumbo DO  05/20/25 2031    "

## 2025-05-20 NOTE — DISCHARGE INSTRUCTIONS
Continue your pain medication as already been prescribed.  You may apply ice to the shoulder as well to try to help with pain and swelling.  Continue follow-up with Dr. Lord tomorrow as you have scheduled.  Your shoulder today looks well with no signs of any infection.  Return to the emergency department for chest pain, shortness of breath, fever, any other new or worsening symptoms concerning to you

## 2025-05-20 NOTE — ED PROVIDER NOTES
Time: 7:39 AM EDT  Date of encounter:  5/20/2025  Independent Historian/Clinical History and Information was obtained by:   Patient    History is limited by: N/A    Chief Complaint: Shoulder pain      History of Present Illness:  Patient is a 44 y.o. year old male who presents to the emergency department for evaluation of shoulder pain.  Patient presents to the emergency department today for evaluation of left shoulder pain.  Patient had surgery on 5 Vestiq/25 for treatment of avascular necrosis of the shoulder.  He states he had been doing well but yesterday the pain became worse in the posterior portion of the shoulder and along his collarbone.  He states last night he began develop a rash on his forearm and developed numbness on the forearm.  The rash is improved but he still flares there is a decrease sensation in the left forearm.  Patient not had any abnormal swelling.  No discoloration.  No fever.  No chest pain, shortness of breath.  Patient states that he has not had any injury and has been utilizing the sling as directed.  Has been taking baclofen and hydrocodone for pain control.      Patient Care Team  Primary Care Provider: Marie Larson MD    Past Medical History:     Allergies   Allergen Reactions    Bupropion Seizure     Past Medical History:   Diagnosis Date    Arthritis     Avascular necrosis     RIGHT HIP    GERD (gastroesophageal reflux disease)     High blood pressure     High cholesterol     Insomnia     Seizure     EARLY 2000'S X1 FELT D/T BUPROPION DENIES ANY FURTHER ISSUES    Sleep apnea     NO CPAP     Past Surgical History:   Procedure Laterality Date    ENDOSCOPY N/A 06/22/2021    Procedure: ESOPHAGOGASTRODUODENOSCOPY with biopsy;  Surgeon: Satish Mejia MD;  Location: [facility] ENDOSCOPY;  Service: General;  Laterality: N/A;  SLIDING HIATAL HERNIA    HIP SURGERY Left 01/2020    Total hip replacement    LUMBAR LAMINECTOMY  2001    SHOULDER ARTHROSCOPY Left 5/6/2025    Procedure: Left  shoulder arthroscopy with core decompression, possible allograft: Surgery using the camera system to evaluate the joint and treat the area of dead bone;  Surgeon: Shantanu Mendez MD;  Location: [facility] [place], [place];  Service: Orthopedics;  Laterality: Left;    TOTAL HIP ARTHROPLASTY Right 10/20/2022    Procedure: RIGHT TOTAL HIP ANTERIOR ARTHOPLASTY;  Surgeon: Shantanu Mendez MD;  Location: [facility] MAIN OR;  Service: Orthopedics;  Laterality: Right;     Family History   Problem Relation Age of Onset    Diabetes Mother     Heart disease Father     Malig Hyperthermia Neg Hx        Home Medications:  Prior to Admission medications    Medication Sig Start Date End Date Taking? Authorizing Provider   pantoprazole (PROTONIX) 40 MG EC tablet Take 1 tablet by mouth Daily. 5/7/25  Yes Joe Colindres MD   ARIPiprazole (ABILIFY) 2 MG tablet Take 1 tablet by mouth Daily.    Joe Colindres MD   baclofen (LIORESAL) 10 MG tablet Take 1 tablet by mouth 3 (Three) Times a Day As Needed.    Joe Colindres MD   docusate sodium (COLACE) 100 MG capsule Take 1 capsule by mouth 2 (Two) Times a Day As Needed for Constipation. 5/6/25   Shantanu Mendez MD   Effexor XR 37.5 MG 24 hr capsule Daily. 2/1/25   Joe Colindres MD   lisinopril (PRINIVIL,ZESTRIL) 20 MG tablet Take 1 tablet by mouth Daily. 10/4/23   Vinod Fenton MD   naloxone (NARCAN) 4 MG/0.1ML nasal spray Call 911. Don't prime. [place] in 1 nostril for overdose. Repeat in 2-3 minutes in other nostril if no or minimal breathing/responsiveness. 5/6/25   Shantanu Mendez MD   ondansetron (Zofran) 4 MG tablet Take 1 tablet by mouth Every 8 (Eight) Hours As Needed for Nausea or Vomiting. 5/6/25   Shantanu Mendez MD   oxyCODONE-acetaminophen (PERCOCET) 5-325 MG per tablet Take 1 tablet by mouth Every 4 (Four) Hours As Needed for Severe Pain. 5/6/25   Shantanu Mendez MD   rosuvastatin (CRESTOR) 20 MG tablet Take 1 tablet by mouth Daily.    Joe Colindres MD   esomeprazole  "(nexIUM) 20 MG capsule Daily. 1/28/25 5/20/25  Provider, Joe, MD        Social History:   Social History     Tobacco Use    Smoking status: Every Day     Current packs/day: 1.00     Average packs/day: 1 pack/day for 15.0 years (15.0 ttl pk-yrs)     Types: Cigarettes    Smokeless tobacco: Never    Tobacco comments:     Instructed per anesthesia protocol    Vaping Use    Vaping status: Never Used   Substance Use Topics    Alcohol use: Never    Drug use: Yes     Types: Marijuana     Comment: stop 24 hours prior         Review of Systems:  Review of Systems   Constitutional:  Negative for fever.   Respiratory:  Negative for shortness of breath.    Cardiovascular:  Negative for chest pain.   Musculoskeletal:  Positive for arthralgias.   Skin:  Positive for rash.   Neurological:  Positive for numbness.   All other systems reviewed and are negative.       Physical Exam:  BP (!) 143/119 (BP Location: Right arm, Patient Position: Sitting)   Pulse 85   Temp 98.3 °F (36.8 °C) (Oral)   Resp 18   Ht 188 cm (74\")   Wt 98 kg (216 lb)   SpO2 95%   BMI 27.73 kg/m²     Physical Exam  Vitals and nursing note reviewed.   Constitutional:       Appearance: Normal appearance. He is normal weight.   HENT:      Head: Normocephalic and atraumatic.      Nose: Nose normal.   Eyes:      Conjunctiva/sclera: Conjunctivae normal.   Cardiovascular:      Rate and Rhythm: Normal rate and regular rhythm.   Pulmonary:      Effort: Pulmonary effort is normal.      Breath sounds: Normal breath sounds.   Abdominal:      General: Abdomen is flat. There is no distension.   Musculoskeletal:         General: Normal range of motion.      Left shoulder: Swelling and tenderness present. No deformity, effusion, laceration, bony tenderness or crepitus. Normal range of motion. Normal strength. Normal pulse.        Arms:       Cervical back: Normal range of motion and neck supple.      Comments: Patient has sensation of the forearms bilaterally but " voice is different on the left forearm   Skin:     General: Skin is warm and dry.   Neurological:      General: No focal deficit present.      Mental Status: He is alert and oriented to person, place, and time.   Psychiatric:         Mood and Affect: Mood normal.         Behavior: Behavior normal.         Thought Content: Thought content normal.         Judgment: Judgment normal.                  Medical Decision Making:    Comorbidities that affect care:    Hypertension, hyperlipidemia, GERD, avascular necrosis    External Notes reviewed:    Previous Clinic Note: Pain management office visit from 5 - 8 - 25 and Previous Operation Note: Surgical visit for avascular necrosis on 5-6-25      The following orders were placed and all results were independently analyzed by me:  Orders Placed This Encounter   Procedures    IP General Consult (Use specialty-specific consult if known)       Medications Given in the Emergency Department:  Medications   HYDROmorphone (DILAUDID) injection 1 mg (1 mg Intramuscular Given 5/20/25 0815)        ED Course:    ED Course as of 05/20/25 0824   Tue May 20, 2025   0801 Discussed patient with Dr. Mendez.  Stated that the incision looks well and nothing needs to be ordered from his standpoint as the bruising and swelling is common. [MD]      ED Course User Index  [MD] Carlos Lindsay PA-C       Labs:    Lab Results (last 24 hours)       ** No results found for the last 24 hours. **             Imaging:    No Radiology Exams Resulted Within Past 24 Hours      Differential Diagnosis and Discussion:    Joint Pain: Differential diagnosis includes but is not limited to polyarticular arthritis, gout, tendinitis, hemarthrosis, septic arthritis, rheumatoid arthritis, bursitis, degenerative joint disease, joint effusion, autoimmune disorder, trauma, and occult neoplasm.    PROCEDURES:      No orders to display       Procedures    MDM  Number of Diagnoses or Management Options  Postoperative  pain, acute, shoulder  Diagnosis management comments: Patient presented to the emergency department today for evaluation of left shoulder pain postoperatively with some numbness in the forearm.  Shoulder looks well, there is mild swelling and bruising related to procedure but the surgical sites are well-healing.  I discussed patient with Dr. Mendez who is the surgeon that completed operation on the patient who stated there is no concern today as the incision site again is well-healing and swelling and bruising are very common after this procedure.  Patient is already being managed by pain management and has pain medication at home.  Patient was given a single dose of Dilaudid here in the emergency department.  I believe the numbness patient is having in the forearm is related to swelling and nerve impingement from the surgery.  Return to the emergency department guidelines provided.    Risk of Complications, Morbidity, and/or Mortality  Presenting problems: moderate  Diagnostic procedures: low  Management options: low    Patient Progress  Patient progress: stable       Patient Care Considerations:    LABS: I considered ordering labs, however patient's incision sites are well-healing and he has no fever      Consultants/Shared Management Plan:    Consultant: I have discussed the case with Dr. Mendez who states no concern his incision sites look good and lots of swelling and bruising this common with his procedure that he had completed    Social Determinants of Health:    Patient is independent, reliable, and has access to care.       Disposition and Care Coordination:    Discharged: The patient is suitable and stable for discharge with no need for consideration of admission.    I have explained the patient´s condition, diagnoses and treatment plan based on the information available to me at this time. I have answered questions and addressed any concerns. The patient has a good  understanding of the patient´s  diagnosis, condition, and treatment plan as can be expected at this point. The vital signs have been stable. The patient´s condition is stable and appropriate for discharge from the emergency department.      The patient will pursue further outpatient evaluation with the primary care physician or other designated or consulting physician as outlined in the discharge instructions. They are agreeable to this plan of care and follow-up instructions have been explained in detail. The patient has received these instructions in written format and has expressed an understanding of the discharge instructions. The patient is aware that any significant change in condition or worsening of symptoms should prompt an immediate return to this or the closest emergency department or call to 911.  I have explained discharge medications and the need for follow up with the patient/caretakers. This was also printed in the discharge instructions. Patient was discharged with the following medications and follow up:      Medication List      No changes were made to your prescriptions during this visit.      Marie Larson MD  [address]  [place] [address]  [phone]             Final diagnoses:   Postoperative pain, acute, shoulder        ED Disposition       ED Disposition   Discharge    Condition   Stable    Comment   --               This medical record created using voice recognition software.             Carlos Lindsay PA-C  05/20/25 0810

## 2025-05-20 NOTE — ED TRIAGE NOTES
"Patient ambulatory to ED from home with left shoudler pain. He had shoulder arthroscopy on May 6th.   Patient has been self administering ibuprofen, baclofen, oxycodone and has no relief. He reports 6/10 pain, ROM is WNL. He reports more of a \"collar bone pain and scapula pain\". He states he had a rash that appeared last night on the left forearm. Rash is not as severe as last nihgt, per patient. It appears raised, he also reports numbness/tingling in the left arm, below the elbow.   "

## 2025-05-21 ENCOUNTER — OFFICE VISIT (OUTPATIENT)
Dept: ORTHOPEDIC SURGERY | Facility: CLINIC | Age: 45
End: 2025-05-21
Payer: MEDICARE

## 2025-05-21 VITALS — SYSTOLIC BLOOD PRESSURE: 134 MMHG | HEART RATE: 86 BPM | OXYGEN SATURATION: 96 % | DIASTOLIC BLOOD PRESSURE: 89 MMHG

## 2025-05-21 DIAGNOSIS — Z98.890 S/P ARTHROSCOPY OF LEFT SHOULDER: Primary | ICD-10-CM

## 2025-05-21 NOTE — PROGRESS NOTES
Chief Complaint  Follow-up of the Left Shoulder    Subjective          Vasiliy Van presents to [facility]   History of Present Illness    Vasiliy Van presents today for a follow up of his left shoulder. Patient is 2 weeks postop left shoulder arthroscopy with core decompression and allograft augmentation performed by Dr. Mendez on 5/6/2025.  Today, patient states that he is doing well.  He reports soreness to his shoulder for which he has been taking his pain medication as needed.  He has been wearing his sling and performing his home exercises.  He denies complications, rest, drainage from his incision site.  Denies fever or chills.  Does state that he noticed a rash to his forearm that was red and splotchy yesterday.  After taking Benadryl, the rash resolved.  He does describe some numbness to his forearm that does not radiate to his fingers.        Allergies   Allergen Reactions    Bupropion Seizure        Social History     Socioeconomic History    Marital status: Legally    Tobacco Use    Smoking status: Every Day     Current packs/day: 1.00     Average packs/day: 1 pack/day for 15.0 years (15.0 ttl pk-yrs)     Types: Cigarettes    Smokeless tobacco: Never    Tobacco comments:     Instructed per anesthesia protocol    Vaping Use    Vaping status: Never Used   Substance and Sexual Activity    Alcohol use: Never    Drug use: Yes     Types: Marijuana     Comment: stop 24 hours prior    Sexual activity: Defer        I reviewed the patient's chief complaint, history of present illness, review of systems, past medical history, surgical history, family history, social history, medications, and allergy list.     REVIEW OF SYSTEMS    Constitutional: Denies fevers, chills, weight loss  Cardiovascular: Denies chest pain, shortness of breath  Skin: Denies rashes, acute skin changes  Neurologic: Denies headache, loss of consciousness  MSK: Left shoulder pain.        Objective   Vital Signs:   /89   Pulse 86   SpO2 96%     There is no height or weight on file to calculate BMI.    Physical Exam    General: Alert, no acute distress  Left upper extremity: Sutures removed today without complications. Arthroscopy portals are well healing without active drainage or redness noted. No concerning signs of infection.  Resolving bruising to the upper arm.  No pain with gentle glenohumeral joint range of motion.  Upper arm soft.  Elbow range of motion intact.  Forearm soft.  Active wrist and finger range of motion. Thumb opposition intact. Palmar abduction of the thumb intact. Sensation intact to the axillary, median, radial, and ulnar nerve distributions. Palpable radial pulse.       Procedures    Imaging Results (Most Recent)       None                     Assessment and Plan    Diagnoses and all orders for this visit:    1. S/P arthroscopy of left shoulder (Primary)  -     Ambulatory Referral to Physical Therapy for Evaluation & Treatment        Vasiliy Van presents today 2 weeks postop left shoulder arthroscopy with core decompression and allograft augmentation on 5/6/2025 performed by Dr. Mendez. Sutures removed today without complications.  Arthroscopy portals are well-healing.  No active redness or drainage noted. No concerning signs of infection. Incision site care was reviewed today. Patient instructed not to soak or submerge incision. Do not apply any lotions, creams, or ointments to the incision at this time.  We discussed concerning signs and symptoms regarding the incision site.  Patient understood and agreed. Patient denies fever or chills. Patient will continue full time sling wear for a minimum of 4 weeks total. We discussed gentle shoulder range of motion exercises. Patient instructed to avoid lifting, pushing, or pulling with the left upper extremity. Patient understood and agreed. Formal physical therapy was ordered today.  We discussed importance  of weaning from narcotic medications.    Patient will follow up in 4 weeks for reevaluation. No new x-rays needed at next visit.       Call or return if symptoms worsen or patient has any concerns.         Follow Up   Return in about 4 weeks (around 6/18/2025).  Patient was given instructions and counseling regarding his condition or for health maintenance advice. Please see specific information pulled into the AVS if appropriate.     Rosa Munoz PA-C  05/21/25  13:07 EDT

## 2025-05-23 ENCOUNTER — OFFICE VISIT (OUTPATIENT)
Dept: SLEEP MEDICINE | Facility: HOSPITAL | Age: 45
End: 2025-05-23
Payer: MEDICARE

## 2025-05-23 VITALS
BODY MASS INDEX: 28.12 KG/M2 | WEIGHT: 219.1 LBS | DIASTOLIC BLOOD PRESSURE: 79 MMHG | HEIGHT: 74 IN | SYSTOLIC BLOOD PRESSURE: 122 MMHG | HEART RATE: 84 BPM | OXYGEN SATURATION: 96 %

## 2025-05-23 DIAGNOSIS — G47.33 OSA (OBSTRUCTIVE SLEEP APNEA): Primary | ICD-10-CM

## 2025-05-23 PROCEDURE — 3078F DIAST BP <80 MM HG: CPT | Performed by: INTERNAL MEDICINE

## 2025-05-23 PROCEDURE — 3074F SYST BP LT 130 MM HG: CPT | Performed by: INTERNAL MEDICINE

## 2025-05-23 PROCEDURE — G0463 HOSPITAL OUTPT CLINIC VISIT: HCPCS

## 2025-05-23 RX ORDER — HYDROCODONE BITARTRATE AND ACETAMINOPHEN 7.5; 325 MG/1; MG/1
TABLET ORAL
COMMUNITY
Start: 2025-05-22

## 2025-05-23 NOTE — PROGRESS NOTES
[facility]      Patient Care Team:  Marie Larson MD as PCP - General (Family Medicine)    Referring Provider: Marie Larson MD    Chief complaint:   Snoring and apneas    History of present illness:    Subjective   This is a 44 year old male patient with hx of HTN and anxiety/depression and GERD.    He reported a diagnosed of severe CHARLY about 10 years ago in [place] and he sed CPAP for 1-2 years off and on and could not tolerate anymore so he stopped breathing.   He thinks he maintained the same weight since then. He recalls having panic attacks with the FFM and issues with air leak when he used the nasal mask.       Patient continues to complain about snoring, frequent awakening, restless sleep, nonrefreshing sleep, frequent leg jerks and would like to get the sleep apnea treated again.    Sleep schedule:  -Bedtime: 8 PM  -Sleep latency: 30 min  -Wake up time: 4 AM , does not feel refreshed  -Nocturnal awakening: 3-4 times because of nocturia.  No difficulties going back to sleep.        ESS: Total score: 9     Review of Systems  Constitutional: No fever or chills. No changes in appetite.   ENMT: No sinus congestion, postnasal drip, hoarsness  Cardiovascular: No chest pain, palpitation or legs swelling.    Respiratory: No dyspnea, cough or wheezing.  Gastrointestinal: No constipation, diarrhea, abdominal pain or acid reflux  Neurology: No headache, weakness, numbness or dizziness.   Musculoskeletal: No joints pain, stiffness or swelling.   Psychiatry: No depression, anxiety or irritability.   Hem/Lymphatic: No swollen glands or easy bruising.  Integumentary: No rash.  Endocrinology: No excessive thirst, cold or warm intolerance.   Urinary: No dysuria, bloody urine or frequent urination.     History  Past Medical History:   Diagnosis Date    Anxiety and depression     Arthritis     Arthritis of back [id number]    Avascular necrosis     RIGHT HIP     GERD (gastroesophageal reflux disease)     High blood pressure     High cholesterol     Hip arthrosis [id number]    Insomnia     Rotator cuff syndrome [id number]    Seizure     EARLY 2000'S X1 FELT D/T BUPROPION DENIES ANY FURTHER ISSUES    Sleep apnea     NO CPAP   ,   Past Surgical History:   Procedure Laterality Date    BACK SURGERY  [id number]    Lumbar laminectomy, [place], [place]    ENDOSCOPY N/A 06/22/2021    Procedure: ESOPHAGOGASTRODUODENOSCOPY with biopsy;  Surgeon: Satish Mejia MD;  Location: [facility] ENDOSCOPY;  Service: General;  Laterality: N/A;  SLIDING HIATAL HERNIA    HIP SURGERY Left 01/2020    Total hip replacement    JOINT REPLACEMENT  [id number]    Hip replacement    LUMBAR LAMINECTOMY  2001    SHOULDER ARTHROSCOPY Left 05/06/2025    Procedure: Left shoulder arthroscopy with core decompression, possible allograft: Surgery using the camera system to evaluate the joint and treat the area of dead bone;  Surgeon: Shantanu Mendez MD;  Location: [facility] OR OSC;  Service: Orthopedics;  Laterality: Left;    TOTAL HIP ARTHROPLASTY Right 10/20/2022    Procedure: RIGHT TOTAL HIP ANTERIOR ARTHOPLASTY;  Surgeon: Shantanu Mendez MD;  Location: [facility] MAIN OR;  Service: Orthopedics;  Laterality: Right;   ,   Family History   Problem Relation Age of Onset    Diabetes Mother     Heart disease Father     Malig Hyperthermia Neg Hx    ,   Social History     Socioeconomic History    Marital status: Legally    Tobacco Use    Smoking status: Every Day     Current packs/day: 1.00     Average packs/day: 1 pack/day for 15.0 years (15.0 ttl pk-yrs)     Types: Cigarettes    Smokeless tobacco: Never    Tobacco comments:     Instructed per anesthesia protocol    Vaping Use    Vaping status: Never Used   Substance and Sexual Activity    Alcohol use: Never    Drug use: Yes     Types: Marijuana     Comment: stop 24 hours prior    Sexual activity: Defer     E-cigarette/Vaping    E-cigarette/Vaping Use Never  "User     Passive Exposure No      E-cigarette/Vaping Substances    Nicotine No     THC No     CBD No     Flavoring No      E-cigarette/Vaping Devices    Disposable No     Pre-filled or Refillable Cartridge No     Refillable Tank No     Pre-filled Pod No          , and Allergies:  Bupropion    Medications:    Current Outpatient Medications:     ARIPiprazole (ABILIFY) 2 MG tablet, Take 1 tablet by mouth Daily., Disp: , Rfl:     Effexor XR 37.5 MG 24 hr capsule, Daily., Disp: , Rfl:     lisinopril (PRINIVIL,ZESTRIL) 20 MG tablet, Take 1 tablet by mouth Daily., Disp: 90 tablet, Rfl: 3    pantoprazole (PROTONIX) 40 MG EC tablet, Take 1 tablet by mouth Daily., Disp: , Rfl:     rosuvastatin (CRESTOR) 20 MG tablet, Take 1 tablet by mouth Daily., Disp: , Rfl:     baclofen (LIORESAL) 10 MG tablet, Take 1 tablet by mouth 3 (Three) Times a Day As Needed. (Patient not taking: Reported on 5/23/2025), Disp: , Rfl:     docusate sodium (COLACE) 100 MG capsule, Take 1 capsule by mouth 2 (Two) Times a Day As Needed for Constipation. (Patient not taking: Reported on 5/21/2025), Disp: 20 capsule, Rfl: 0    HYDROcodone-acetaminophen (NORCO) 7.5-325 MG per tablet, Take 1 tablet every 8 hours by oral route as needed for 4 days. (Patient not taking: Reported on 5/23/2025), Disp: , Rfl:     naloxone (NARCAN) 4 MG/0.1ML nasal spray, Call 911. Don't prime. [place] in 1 nostril for overdose. Repeat in 2-3 minutes in other nostril if no or minimal breathing/responsiveness. (Patient not taking: Reported on 5/23/2025), Disp: 2 each, Rfl: 0    ondansetron (Zofran) 4 MG tablet, Take 1 tablet by mouth Every 8 (Eight) Hours As Needed for Nausea or Vomiting. (Patient not taking: Reported on 5/23/2025), Disp: 30 tablet, Rfl: 0      Objective   Vital Signs:  Vitals:    05/23/25 1300   BP: 122/79   Pulse: 84   SpO2: 96%   Weight: 99.4 kg (219 lb 1.6 oz)   Height: 188 cm (74\")     Body mass index is 28.13 kg/m².  Neck Circumference: 16 inches     Physical " Exam:  Neck Circumference: 16 inches     Constitutional: Not in acute distress.  Eyes: Injected conjunctiva, EOMI. pupils equal reactive to light.  ENMT: Holder score 4. Mallampati score 4. No oral thrush. Tonsils grade 1. Narrow distance in between the posterior pharyngeal pillars.  Large tongue.  Neck: Large. No thyromegaly.  Trachea midline.  Heart: Regular rhythm and rate, no murmur  Lungs: Good and equal air entry bilaterally. No crackles or wheezing.  Nonlabored breathing.       Abdomen: Obese.  Soft.  No tenderness.  Positive bowel sounds.  Extremities: No cyanosis, clubbing or pitting edema.  Warm extremities and well-perfused.  Neuro: Conscious, alert, oriented x3.  Gait is normal.  Strength 5/5 in arms and legs.  Psych: Appropriate mood and affect.    Integumentary: No rash.  Normal skin turgor.  Lymphatic: No palpable cervical or supraclavicular lymph nodes.    Diagnostic data:  Lab Results   Component Value Date    HGBA1C 6.00 (H) 06/15/2023     Total Cholesterol   Date Value Ref Range Status   06/15/2023 166 0 - 200 mg/dL Final     Triglycerides   Date Value Ref Range Status   06/15/2023 243 (H) 0 - 150 mg/dL Final     HDL Cholesterol   Date Value Ref Range Status   06/15/2023 31 (L) 40 - 60 mg/dL Final     Hemoglobin   Date Value Ref Range Status   06/15/2023 13.5 13.0 - 17.7 g/dL Final     CO2   Date Value Ref Range Status   06/15/2023 28.8 22.0 - 29.0 mmol/L Final        Assessment   CHARLY  Overweight, BMI 28  HTN        Plan:  Check HST to requalify for therapy. We discussed the pathophysiology of sleep apnea, testing and therapy which include CPAP and weight loss.  Patient is agreeable with CPAP therapy if needed.    Counseled for weight loss.  Encouraged to exercise regularly and cut down on carbohydrates.  Discussed that losing weight may decrease the severity of sleep apnea and obviate the need of CPAP therapy.    Continue lisinopril.  Discussed the association between obstructive sleep apnea  and cardiovascular disease including HTN and the beneficial effect of Pap therapy in reducing the risk of major cardiovascular events.          Krissy Ott MD  05/23/25  15:12 EDT    This note was dictated utilizing Dragon dictation

## 2025-06-04 ENCOUNTER — TREATMENT (OUTPATIENT)
Dept: PHYSICAL THERAPY | Facility: CLINIC | Age: 45
End: 2025-06-04
Payer: MEDICARE

## 2025-06-04 DIAGNOSIS — G89.18 ACUTE POSTOPERATIVE PAIN OF LEFT SHOULDER: Primary | ICD-10-CM

## 2025-06-04 DIAGNOSIS — R29.898 WEAKNESS OF LEFT SHOULDER: ICD-10-CM

## 2025-06-04 DIAGNOSIS — M25.512 ACUTE POSTOPERATIVE PAIN OF LEFT SHOULDER: Primary | ICD-10-CM

## 2025-06-04 PROCEDURE — 97161 PT EVAL LOW COMPLEX 20 MIN: CPT

## 2025-06-04 PROCEDURE — 97110 THERAPEUTIC EXERCISES: CPT

## 2025-06-04 PROCEDURE — 97535 SELF CARE MNGMENT TRAINING: CPT

## 2025-06-09 ENCOUNTER — HOSPITAL ENCOUNTER (OUTPATIENT)
Dept: SLEEP MEDICINE | Facility: HOSPITAL | Age: 45
Discharge: HOME | End: 2025-06-09
Admitting: INTERNAL MEDICINE
Payer: MEDICARE

## 2025-06-09 DIAGNOSIS — G47.33 OSA (OBSTRUCTIVE SLEEP APNEA): ICD-10-CM

## 2025-06-09 PROCEDURE — G0399 HOME SLEEP TEST/TYPE 3 PORTA: HCPCS

## 2025-06-12 ENCOUNTER — TELEPHONE (OUTPATIENT)
Dept: GASTROENTEROLOGY | Facility: CLINIC | Age: 45
End: 2025-06-12
Payer: MEDICARE

## 2025-06-13 DIAGNOSIS — G47.33 OSA (OBSTRUCTIVE SLEEP APNEA): Primary | ICD-10-CM

## 2025-06-18 ENCOUNTER — OFFICE VISIT (OUTPATIENT)
Dept: ORTHOPEDIC SURGERY | Facility: CLINIC | Age: 45
End: 2025-06-18
Payer: MEDICARE

## 2025-06-18 VITALS — HEIGHT: 74 IN | WEIGHT: 219 LBS | BODY MASS INDEX: 28.11 KG/M2

## 2025-06-18 DIAGNOSIS — Z98.890 S/P ARTHROSCOPY OF LEFT SHOULDER: Primary | ICD-10-CM

## 2025-06-18 DIAGNOSIS — M87.00 AVN (AVASCULAR NECROSIS OF BONE): ICD-10-CM

## 2025-06-18 NOTE — PROGRESS NOTES
"Chief Complaint  Follow-up of the Left Shoulder    Subjective          Vasiliy Van presents to [facility] for a follow up for his left shoulder.     History of Present Illness    The patient presents here today for a follow up for his left shoulder. He underwent a left shoulder arthroscopy with core decompression and allograft augmentation performed on 05/06/25. He presents to the office today for his six week post-operative appointment today. He has been attending outpatient physical therapy and is overall doing well.     Allergies   Allergen Reactions    Bupropion Seizure        Social History     Socioeconomic History    Marital status: Legally    Tobacco Use    Smoking status: Every Day     Current packs/day: 1.00     Average packs/day: 1 pack/day for 15.0 years (15.0 ttl pk-yrs)     Types: Cigarettes    Smokeless tobacco: Never    Tobacco comments:     Instructed per anesthesia protocol    Vaping Use    Vaping status: Never Used   Substance and Sexual Activity    Alcohol use: Never    Drug use: Yes     Types: Marijuana     Comment: stop 24 hours prior    Sexual activity: Defer        I reviewed the patient's chief complaint, history of present illness, review of systems, past medical history, surgical history, family history, social history, medications, and allergy list.     REVIEW OF SYSTEMS    Constitutional: Denies fevers, chills, weight loss  Cardiovascular: Denies chest pain, shortness of breath  Skin: Denies rashes, acute skin changes  Neurologic: Denies headache, loss of consciousness  MSK: left shoulder pain       Objective   Vital Signs:   Ht 188 cm (74\")   Wt 99.3 kg (219 lb)   BMI 28.12 kg/m²     Body mass index is 28.12 kg/m².    Physical Exam    General: Alert. No acute distress.   Left upper extremity: forward elevation  to 180 degrees, external rotation at the side to 45 degrees, internal rotation to mid lumbar, well healed surgicial incision, " 5/5 rotator cuff strength, neurovascularly intact, positive  pulses, sensation intact to the medial, radial and ulnar nerve      Procedures    Imaging Results (Most Recent)       None                     Assessment and Plan        Home Sleep Study  Result Date: 6/13/2025  Narrative: Home sleep apnea test report Vasiliy Van 1980 [id number] 06/13/25 09:57 EDT Interpreting Physician: Dagmar Ott MD Referring Physician:  Dagmar Ott MD Primary Care Physician: Marie Larson MD Study date: 6/9/2025 Clinical Information: Patient is a 44 y.o. male with hx of HTN and anxiety/depression and GERD.  BMI 28.  He reported a diagnosed of severe CHARLY about 10 years ago in [place].  Currently having symptoms of CHARLY again. [place] Sleepiness Scale:  Methods: Study performed based on the standardized protocol. Desaturation is defined as drop in SPO2 of 4% Home sleep apnea test: Device: DonorPro Findings: TRT (total recording time)= 564 min; MT (monitoring time)= 499 min. Patient experienced 463 obstructive apnea, 0 central apnea and 12 hypopneas resulting in total JARET: 57 /h. Supine time was 549 minutes resulting in Supine JARET: 57 /h. VERÓNICA=50 /h; C[place] SpO2=71 % and hypoxic burden: 114 min. Diagnosis: 1) Obstructive sleep apnea (G47.33) Recommendations: - Due to severe disease, PAP titration study is recommended instead of auto CPAP therapy. General recommendations: - Weight loss is recommended. - Avoid driving or operating heavy machinery while sleepy. [6/13/2025 10:02:48 AM - DAGMAR OTT]         Diagnoses and all orders for this visit:    1. S/P arthroscopy of left shoulder (Primary)    2. AVN (avascular necrosis of bone)        The patient presents here today for a follow up for his left shoulder.     He is overall doing well and denies any pain to his left shoulder.     He will continue outpatient physical therapy and current medications for pain control.      We discussed progressing strengthening  exercises.    We will obtain x-rays of the left shoulder when he returns    Call or return if worsening symptoms.    Scribed for Shantanu Mendez MD by Kate Mace  06/18/2025   09:17 EDT         Follow Up     8 weeks     Patient was given instructions and counseling regarding his condition or for health maintenance advice. Please see specific information pulled into the AVS if appropriate.     I have personally performed the services described in this document as scribed by the above individual and it is both accurate and complete. Shantanu Mendez MD 06/18/25 12:15 EDT

## 2025-07-30 ENCOUNTER — OFFICE VISIT (OUTPATIENT)
Dept: ORTHOPEDIC SURGERY | Facility: CLINIC | Age: 45
End: 2025-07-30
Payer: MEDICARE

## 2025-07-30 VITALS
HEART RATE: 91 BPM | OXYGEN SATURATION: 93 % | SYSTOLIC BLOOD PRESSURE: 108 MMHG | DIASTOLIC BLOOD PRESSURE: 66 MMHG | WEIGHT: 219 LBS | BODY MASS INDEX: 28.11 KG/M2 | HEIGHT: 74 IN

## 2025-07-30 DIAGNOSIS — Z98.890 S/P ARTHROSCOPY OF LEFT SHOULDER: Primary | ICD-10-CM

## 2025-07-30 DIAGNOSIS — M25.512 LEFT SHOULDER PAIN, UNSPECIFIED CHRONICITY: ICD-10-CM

## 2025-07-30 DIAGNOSIS — M87.00 AVN (AVASCULAR NECROSIS OF BONE): ICD-10-CM

## 2025-07-30 NOTE — PROGRESS NOTES
Chief Complaint  Follow-up of the Left Shoulder    Subjective          History of Present Illness      History of Present Illness  The patient is a 44-year-old male who presents for follow-up of his left shoulder. He underwent left shoulder arthroscopy with a cord decompression and allograft augmentation performed by Dr. Mendez on 05/06/2025.    He was last seen in the office on 06/18/2025 and was instructed to continue physical therapy. He reports an improvement in his left shoulder condition, attributing it to the physical therapy he has been undergoing at home. He describes the pain as achy and notes that it is more pronounced in the front of his shoulder. He is right-handed and finds the pain manageable, although it intensifies when he lies flat on his back. He has a recliner at home for comfort. He has been managing the pain with occasional ibuprofen.     He has a history of avascular necrosis and has undergone bilateral hip replacements and back surgery.    PAST SURGICAL HISTORY:  Bilateral hip replacements  Back surgery    SOCIAL HISTORY  Exercise: Home exercise, range of motion      Allergies   Allergen Reactions    Bupropion Seizure        Social History     Socioeconomic History    Marital status: Legally    Tobacco Use    Smoking status: Every Day     Current packs/day: 1.00     Average packs/day: 1 pack/day for 15.0 years (15.0 ttl pk-yrs)     Types: Cigarettes    Smokeless tobacco: Never    Tobacco comments:     Instructed per anesthesia protocol    Vaping Use    Vaping status: Never Used   Substance and Sexual Activity    Alcohol use: Never    Drug use: Yes     Types: Marijuana     Comment: stop 24 hours prior    Sexual activity: Defer        I reviewed the patient's chief complaint, history of present illness, review of systems, past medical history, surgical history, family history, social history, medications, and allergy list.     REVIEW OF SYSTEMS    14 point review of systems negative  "except as noted above in the HPI    Objective     Vital Signs:     /66   Pulse 91   Ht 188 cm (74\")   Wt 99.3 kg (219 lb)   SpO2 93%   BMI 28.12 kg/m²     Body mass index is 28.12 kg/m².    Physical Exam    General: Well developed, well nourished. Alert. No acute distress.    Left upper Extremity: Skin is intact, dry, Well healed incision sites. No erythema, warmth, drainage, or bruising. No skin discoloration, atrophy.  No significant swelling.  180 degrees active elevation. External rotation to 45 degrees. Internal rotation to mid lumbar spine. Able to get hand behind head. Full elbow ROM. Palpable radial pulse.  Sensation intact in the axillary, median, radial, ulnar nerve distributions. Intact active motor function in the AIN, PIN, ulnar nerve distribution. Neurovascularly intact.  Muscle strength is 5/5.           Procedures    IMAGING    XR Shoulder 2+ View Left  Result Date: 7/30/2025  Narrative: Imaging Report: Study: X-rays ordered, taken in the office, and reviewed today Indication: Postop left shoulder arthroscopy follow-up View: AP, Grashey and Scapular Y View Findings: 2 views left shoulder xrays were taken today in the office which shows evidence of avascular necrosis of superior humeral head, it's stable and joint line appears maintained. No acute fractures or displacement seen Prior studies available for comparison: Yes              Assessment and Plan      Diagnoses and all orders for this visit:    1. S/P arthroscopy of left shoulder (Primary)  -     XR Shoulder 2+ View Left    2. AVN (avascular necrosis of bone)    3. Left shoulder pain, unspecified chronicity  -     XR Shoulder 2+ View Left        Mr. Vasiliy Van is a 44 y.o. male patient presents at office today for follow up of 2-1/2 months status post left shoulder arthroscopy with a core decompression and allograft augmentation by Dr. Mendez on May 6, 2025,    Assessment & Plan  Post-operative status of left shoulder " "arthroscopy:    Reports improvement in shoulder condition with residual soreness, mainly in the front and back. Engaged in some home physical therapy. Examination indicates good range of motion without significant tenderness. Advised to continue home exercises and range of motion activities. Voltaren gel suggested for pain relief and swelling reduction. Advised to avoid lifting heavy objects and predominantly use the right hand.     Avascular necrosis:    X-ray was obtained today and discussed with patient.  We will continue to monitor his avascular necrosis over his humeral head.  He is to continue range of motion exercises. Injections discouraged due to avascular necrosis.  Advised the patient to call if severe pain occur.    Follow-up: Scheduled in 6 weeks.      Advised the patient to call or return if symptoms worsen or patient has any concerns.       Follow Up     Return in about 6 weeks (around 9/10/2025).    Patient was given instructions and counseling regarding his condition or for health maintenance advice. Please see specific information pulled into the AVS if appropriate.     Patient or patient representative verbalized consent for the use of Ambient Listening during the visit with JOSEPHINE Salas chart documentation. 7/30/2025 16:07 EDT     \" Dictated utilizing Dragon dictation: Part of this note may be electronic transcription/translation of spoken language to printed text by using the Dragon dictation system.\"    Electronically signed by JOSEPHINE Salas, 07/30/25, 2:50 PM EDT.    "

## 2025-08-12 ENCOUNTER — CLINICAL SUPPORT (OUTPATIENT)
Dept: GASTROENTEROLOGY | Facility: CLINIC | Age: 45
End: 2025-08-12
Payer: MEDICARE

## 2025-08-12 ENCOUNTER — PREP FOR SURGERY (OUTPATIENT)
Dept: OTHER | Facility: HOSPITAL | Age: 45
End: 2025-08-12
Payer: MEDICARE

## 2025-08-12 DIAGNOSIS — K21.9 GASTROESOPHAGEAL REFLUX DISEASE, UNSPECIFIED WHETHER ESOPHAGITIS PRESENT: Primary | ICD-10-CM

## (undated) DEVICE — BURSECTOR 5.5 MM X 125 MM.  DO NOT RESTERILIZE, DO NOT USE IF PACKAGE IS DAMAGED, KEEP DRY, KEEP AWAY FROM DIRECT SUNLIGHT: Brand: CROSSBLADE

## (undated) DEVICE — BLD CUT FORMLA AGGR PLS 4.0MM

## (undated) DEVICE — APPL CHLORAPREP HI/LITE 26ML ORNG

## (undated) DEVICE — GAUZE,SPONGE,4"X4",16PLY,STRL,LF,10/TRAY: Brand: MEDLINE

## (undated) DEVICE — SOL IRRG H2O PL/BG 1000ML STRL

## (undated) DEVICE — TBG INFLOW/OUTFLOW DUALWAVE 1P/U

## (undated) DEVICE — PENCL E/S SMOKEEVAC W/TELESCP CANN

## (undated) DEVICE — SLV SCD KN/LEN ADJ EXPRSS BLENDED MD 1P/U

## (undated) DEVICE — ELECTRD BLD EDGE COAT 3IN

## (undated) DEVICE — UNDYED BRAIDED (POLYGLACTIN 910), SYNTHETIC ABSORBABLE SUTURE: Brand: COATED VICRYL

## (undated) DEVICE — GLV SURG SENSICARE PI ORTHO SZ8 LF STRL

## (undated) DEVICE — KT POSTN ARM TRIMANO BEACH CHR W/DRP

## (undated) DEVICE — SINGLE-USE BIOPSY FORCEPS: Brand: RADIAL JAW 4

## (undated) DEVICE — Device: Brand: DEFENDO AIR/WATER/SUCTION AND BIOPSY VALVE

## (undated) DEVICE — DRSNG SURG AQUACEL AG/ADVNTGE 9X25CM 3.5X10IN

## (undated) DEVICE — 450 ML BOTTLE OF 0.05% CHLORHEXIDINE GLUCONATE IN 99.95% STERILE WATER FOR IRRIGATION, USP AND APPLICATOR.: Brand: IRRISEPT ANTIMICROBIAL WOUND LAVAGE

## (undated) DEVICE — SLV DISTRACTION STAR VELCRO XL DISP

## (undated) DEVICE — EGD OR ERCP KIT: Brand: MEDLINE INDUSTRIES, INC.

## (undated) DEVICE — TOTAL ANTERIOR HIP-LF: Brand: MEDLINE INDUSTRIES, INC.

## (undated) DEVICE — SHOULDER P.A.D. III: Brand: DEROYAL

## (undated) DEVICE — 90-S CRUISE, SUCTION PROBE, NON-BENDABLE, MAX CUT LEVEL 1: Brand: SERFAS ENERGY

## (undated) DEVICE — MEDI-VAC NON-CONDUCTIVE SUCTION TUBING: Brand: CARDINAL HEALTH

## (undated) DEVICE — SHOULDER ARTHROSCOPY-LF: Brand: MEDLINE INDUSTRIES, INC.

## (undated) DEVICE — SUT PROLN 0 CT1 30IN 8424H

## (undated) DEVICE — BIPOLAR SEALER 23-112-1 AQM 6.0: Brand: AQUAMANTYS™

## (undated) DEVICE — STRYKER PERFORMANCE SERIES SAGITTAL BLADE: Brand: STRYKER PERFORMANCE SERIES

## (undated) DEVICE — STERILE POLYISOPRENE POWDER-FREE SURGICAL GLOVES: Brand: PROTEXIS

## (undated) DEVICE — CANN TRPL DAM 7X7MM

## (undated) DEVICE — SUT ETHLN 3-0 FS118IN 663H

## (undated) DEVICE — SUT VIC UD BR COAT 0 CP2 27IN

## (undated) DEVICE — GLV SURG SENSICARE SLT PF LF 8 STRL

## (undated) DEVICE — MAT FLR ABS W/BLU/LINER 56X72IN WHT

## (undated) DEVICE — ELECTRD BLD EXT EDGE 1P COAT 6.5IN STRL

## (undated) DEVICE — 3M™ STERI-DRAPE™ U-DRAPE 1015: Brand: STERI-DRAPE™

## (undated) DEVICE — SUT POLY FORCEFIBER W/CUT/NDL NO5 38IN

## (undated) DEVICE — PULLOVER TOGA, 2X LARGE: Brand: FLYTE, SURGICOOL

## (undated) DEVICE — BIT DRL RNGLC QC 3.2X20MM

## (undated) DEVICE — SUT ETHLN 3/0 FS1 663G

## (undated) DEVICE — Device

## (undated) DEVICE — ZIPPERED TOGA, PEEL-AWAY 3X LARGE: Brand: FLYTE, SURGICOOL

## (undated) DEVICE — DRESSING,GAUZE,XEROFORM,CURAD,1"X8",ST: Brand: CURAD

## (undated) DEVICE — SOL IRR NACL 0.9PCT 3000ML

## (undated) DEVICE — TOWEL,OR,DSP,ST,BLUE,STD,4/PK,20PK/CS: Brand: MEDLINE

## (undated) DEVICE — KT PT POSITION SUPINE HANA/PROFX TABL

## (undated) DEVICE — INTENDED FOR TISSUE SEPARATION, AND OTHER PROCEDURES THAT REQUIRE A SHARP SURGICAL BLADE TO PUNCTURE OR CUT.: Brand: BARD-PARKER ® CARBON RIB-BACK BLADES

## (undated) DEVICE — CVR LEG BOOTLEG F/R NOSKID 33IN